# Patient Record
Sex: FEMALE | Race: WHITE | Employment: OTHER | URBAN - METROPOLITAN AREA
[De-identification: names, ages, dates, MRNs, and addresses within clinical notes are randomized per-mention and may not be internally consistent; named-entity substitution may affect disease eponyms.]

---

## 2017-01-05 ENCOUNTER — TELEPHONE (OUTPATIENT)
Dept: FAMILY MEDICINE CLINIC | Age: 57
End: 2017-01-05

## 2017-01-05 RX ORDER — ALPRAZOLAM 0.25 MG/1
0.25 TABLET ORAL 2 TIMES DAILY PRN
Qty: 40 TABLET | Refills: 0 | Status: SHIPPED | OUTPATIENT
Start: 2017-01-05 | End: 2017-02-01 | Stop reason: ALTCHOICE

## 2017-01-23 ENCOUNTER — OFFICE VISIT (OUTPATIENT)
Dept: FAMILY MEDICINE CLINIC | Age: 57
End: 2017-01-23

## 2017-01-23 VITALS
WEIGHT: 159.4 LBS | HEART RATE: 72 BPM | BODY MASS INDEX: 25.02 KG/M2 | DIASTOLIC BLOOD PRESSURE: 84 MMHG | SYSTOLIC BLOOD PRESSURE: 138 MMHG | HEIGHT: 67 IN | TEMPERATURE: 98.2 F

## 2017-01-23 DIAGNOSIS — E11.9 TYPE 2 DIABETES MELLITUS WITHOUT COMPLICATION, WITHOUT LONG-TERM CURRENT USE OF INSULIN (HCC): Primary | ICD-10-CM

## 2017-01-23 DIAGNOSIS — E11.9 TYPE 2 DIABETES MELLITUS WITHOUT COMPLICATION, WITHOUT LONG-TERM CURRENT USE OF INSULIN (HCC): ICD-10-CM

## 2017-01-23 DIAGNOSIS — R01.1 HEART MURMUR: ICD-10-CM

## 2017-01-23 LAB
A/G RATIO: 1.7 (ref 1.1–2.2)
ALBUMIN SERPL-MCNC: 4.3 G/DL (ref 3.4–5)
ALP BLD-CCNC: 163 U/L (ref 40–129)
ALT SERPL-CCNC: 76 U/L (ref 10–40)
ANION GAP SERPL CALCULATED.3IONS-SCNC: 15 MMOL/L (ref 3–16)
AST SERPL-CCNC: 43 U/L (ref 15–37)
BILIRUB SERPL-MCNC: 0.4 MG/DL (ref 0–1)
BUN BLDV-MCNC: 11 MG/DL (ref 7–20)
CALCIUM SERPL-MCNC: 9.4 MG/DL (ref 8.3–10.6)
CHLORIDE BLD-SCNC: 101 MMOL/L (ref 99–110)
CO2: 27 MMOL/L (ref 21–32)
CREAT SERPL-MCNC: 0.7 MG/DL (ref 0.6–1.1)
GFR AFRICAN AMERICAN: >60
GFR NON-AFRICAN AMERICAN: >60
GLOBULIN: 2.6 G/DL
GLUCOSE BLD-MCNC: 95 MG/DL (ref 70–99)
POTASSIUM SERPL-SCNC: 4.3 MMOL/L (ref 3.5–5.1)
SODIUM BLD-SCNC: 143 MMOL/L (ref 136–145)
TOTAL PROTEIN: 6.9 G/DL (ref 6.4–8.2)

## 2017-01-23 PROCEDURE — 99213 OFFICE O/P EST LOW 20 MIN: CPT | Performed by: FAMILY MEDICINE

## 2017-01-23 RX ORDER — HYDROCODONE BITARTRATE AND ACETAMINOPHEN 5; 325 MG/1; MG/1
TABLET ORAL
COMMUNITY
Start: 2017-01-09 | End: 2017-02-07 | Stop reason: ALTCHOICE

## 2017-01-23 ASSESSMENT — ENCOUNTER SYMPTOMS
SHORTNESS OF BREATH: 0
SORE THROAT: 0
COUGH: 0
VOICE CHANGE: 0

## 2017-01-24 DIAGNOSIS — K76.0 FATTY LIVER: Primary | ICD-10-CM

## 2017-01-24 LAB
ESTIMATED AVERAGE GLUCOSE: 122.6 MG/DL
HBA1C MFR BLD: 5.9 %

## 2017-01-27 ENCOUNTER — HOSPITAL ENCOUNTER (OUTPATIENT)
Dept: NON INVASIVE DIAGNOSTICS | Age: 57
Discharge: HOME | End: 2017-01-27
Attending: FAMILY MEDICINE | Admitting: FAMILY MEDICINE

## 2017-01-27 DIAGNOSIS — R01.1 CARDIAC MURMUR: ICD-10-CM

## 2017-01-27 LAB
LV EF: 40 %
LVEF MODALITY: NORMAL

## 2017-01-30 ENCOUNTER — TELEPHONE (OUTPATIENT)
Dept: FAMILY MEDICINE CLINIC | Age: 57
End: 2017-01-30

## 2017-02-01 ENCOUNTER — OFFICE VISIT (OUTPATIENT)
Dept: CARDIOLOGY CLINIC | Age: 57
End: 2017-02-01

## 2017-02-01 ENCOUNTER — CARE COORDINATION (OUTPATIENT)
Dept: FAMILY MEDICINE CLINIC | Age: 57
End: 2017-02-01

## 2017-02-01 VITALS
SYSTOLIC BLOOD PRESSURE: 142 MMHG | HEIGHT: 67 IN | HEART RATE: 90 BPM | WEIGHT: 160.8 LBS | OXYGEN SATURATION: 97 % | BODY MASS INDEX: 25.24 KG/M2 | DIASTOLIC BLOOD PRESSURE: 90 MMHG

## 2017-02-01 DIAGNOSIS — R00.2 PALPITATION: ICD-10-CM

## 2017-02-01 DIAGNOSIS — F17.210 CIGARETTE NICOTINE DEPENDENCE WITHOUT COMPLICATION: ICD-10-CM

## 2017-02-01 DIAGNOSIS — I42.9 CARDIOMYOPATHY (HCC): Primary | ICD-10-CM

## 2017-02-01 DIAGNOSIS — I10 ESSENTIAL HYPERTENSION: ICD-10-CM

## 2017-02-01 DIAGNOSIS — I35.1 NONRHEUMATIC AORTIC VALVE INSUFFICIENCY: ICD-10-CM

## 2017-02-01 DIAGNOSIS — E78.2 MIXED HYPERLIPIDEMIA: ICD-10-CM

## 2017-02-01 PROCEDURE — 99214 OFFICE O/P EST MOD 30 MIN: CPT | Performed by: INTERNAL MEDICINE

## 2017-02-01 RX ORDER — LOSARTAN POTASSIUM 25 MG/1
25 TABLET ORAL DAILY
Qty: 30 TABLET | Refills: 3 | Status: SHIPPED | OUTPATIENT
Start: 2017-02-01 | End: 2017-02-06 | Stop reason: SDUPTHER

## 2017-02-01 RX ORDER — SULINDAC 200 MG/1
200 TABLET ORAL 2 TIMES DAILY
COMMUNITY
End: 2017-04-03 | Stop reason: SDUPTHER

## 2017-02-06 RX ORDER — LOSARTAN POTASSIUM 50 MG/1
50 TABLET ORAL DAILY
Qty: 30 TABLET | Refills: 6 | Status: SHIPPED | OUTPATIENT
Start: 2017-02-06 | End: 2017-02-23 | Stop reason: SDUPTHER

## 2017-02-14 DIAGNOSIS — I42.9 CARDIOMYOPATHY (HCC): ICD-10-CM

## 2017-02-14 LAB
ANION GAP SERPL CALCULATED.3IONS-SCNC: 15 MMOL/L (ref 3–16)
BUN BLDV-MCNC: 14 MG/DL (ref 7–20)
CALCIUM SERPL-MCNC: 9.3 MG/DL (ref 8.3–10.6)
CHLORIDE BLD-SCNC: 103 MMOL/L (ref 99–110)
CO2: 25 MMOL/L (ref 21–32)
CREAT SERPL-MCNC: 0.8 MG/DL (ref 0.6–1.1)
GFR AFRICAN AMERICAN: >60
GFR NON-AFRICAN AMERICAN: >60
GLUCOSE BLD-MCNC: 117 MG/DL (ref 70–99)
POTASSIUM SERPL-SCNC: 4.7 MMOL/L (ref 3.5–5.1)
PRO-BNP: 81 PG/ML (ref 0–124)
SODIUM BLD-SCNC: 143 MMOL/L (ref 136–145)

## 2017-02-21 RX ORDER — METFORMIN HYDROCHLORIDE 500 MG/1
TABLET, EXTENDED RELEASE ORAL
Qty: 180 TABLET | Refills: 1 | Status: SHIPPED | OUTPATIENT
Start: 2017-02-21 | End: 2017-06-08 | Stop reason: SDUPTHER

## 2017-02-21 RX ORDER — MONTELUKAST SODIUM 10 MG/1
TABLET ORAL
Qty: 90 TABLET | Refills: 1 | Status: SHIPPED | OUTPATIENT
Start: 2017-02-21 | End: 2017-06-30 | Stop reason: SDUPTHER

## 2017-02-22 ENCOUNTER — TELEPHONE (OUTPATIENT)
Dept: CASE MANAGEMENT | Age: 57
End: 2017-02-22

## 2017-02-23 RX ORDER — LOSARTAN POTASSIUM 50 MG/1
50 TABLET ORAL DAILY
Qty: 90 TABLET | Refills: 3 | Status: SHIPPED | OUTPATIENT
Start: 2017-02-23 | End: 2017-10-23 | Stop reason: SDUPTHER

## 2017-03-15 DIAGNOSIS — K76.0 FATTY LIVER: ICD-10-CM

## 2017-03-15 LAB
ALBUMIN SERPL-MCNC: 4.4 G/DL (ref 3.4–5)
ALP BLD-CCNC: 150 U/L (ref 40–129)
ALT SERPL-CCNC: 50 U/L (ref 10–40)
AST SERPL-CCNC: 37 U/L (ref 15–37)
BILIRUB SERPL-MCNC: 0.3 MG/DL (ref 0–1)
BILIRUBIN DIRECT: <0.2 MG/DL (ref 0–0.3)
BILIRUBIN, INDIRECT: ABNORMAL MG/DL (ref 0–1)
TOTAL PROTEIN: 7.1 G/DL (ref 6.4–8.2)

## 2017-03-22 ENCOUNTER — CARE COORDINATION (OUTPATIENT)
Dept: FAMILY MEDICINE CLINIC | Age: 57
End: 2017-03-22

## 2017-04-03 RX ORDER — VENLAFAXINE HYDROCHLORIDE 150 MG/1
CAPSULE, EXTENDED RELEASE ORAL
Qty: 90 CAPSULE | Refills: 1 | Status: SHIPPED | OUTPATIENT
Start: 2017-04-03 | End: 2017-07-08 | Stop reason: SDUPTHER

## 2017-04-03 RX ORDER — SULINDAC 200 MG/1
TABLET ORAL
Qty: 180 TABLET | Refills: 1 | Status: SHIPPED | OUTPATIENT
Start: 2017-04-03 | End: 2017-07-08 | Stop reason: SDUPTHER

## 2017-04-03 RX ORDER — PRAVASTATIN SODIUM 40 MG
TABLET ORAL
Qty: 90 TABLET | Refills: 1 | Status: SHIPPED | OUTPATIENT
Start: 2017-04-03 | End: 2017-07-08 | Stop reason: SDUPTHER

## 2017-04-17 ENCOUNTER — OFFICE VISIT (OUTPATIENT)
Dept: FAMILY MEDICINE CLINIC | Age: 57
End: 2017-04-17

## 2017-04-17 VITALS
BODY MASS INDEX: 26.09 KG/M2 | DIASTOLIC BLOOD PRESSURE: 86 MMHG | HEIGHT: 67 IN | SYSTOLIC BLOOD PRESSURE: 138 MMHG | WEIGHT: 166.2 LBS | TEMPERATURE: 97.8 F

## 2017-04-17 DIAGNOSIS — R31.9 HEMATURIA: ICD-10-CM

## 2017-04-17 DIAGNOSIS — R31.9 HEMATURIA: Primary | ICD-10-CM

## 2017-04-17 LAB
BILIRUBIN URINE: NEGATIVE
BLOOD, URINE: NEGATIVE
CLARITY: ABNORMAL
COLOR: YELLOW
EPITHELIAL CELLS, UA: 3 /HPF (ref 0–5)
GLUCOSE URINE: NEGATIVE MG/DL
HYALINE CASTS: 4 /LPF (ref 0–8)
KETONES, URINE: ABNORMAL MG/DL
LEUKOCYTE ESTERASE, URINE: ABNORMAL
MICROSCOPIC EXAMINATION: YES
NITRITE, URINE: NEGATIVE
PH UA: 6.5
PROTEIN UA: NEGATIVE MG/DL
RBC UA: 8 /HPF (ref 0–4)
SPECIFIC GRAVITY UA: 1.02
UROBILINOGEN, URINE: 1 E.U./DL
WBC UA: 5 /HPF (ref 0–5)

## 2017-04-17 PROCEDURE — 99213 OFFICE O/P EST LOW 20 MIN: CPT | Performed by: FAMILY MEDICINE

## 2017-04-17 RX ORDER — ALPRAZOLAM 0.25 MG/1
0.25 TABLET ORAL 2 TIMES DAILY PRN
Qty: 30 TABLET | Refills: 0 | Status: SHIPPED | OUTPATIENT
Start: 2017-04-17 | End: 2017-07-13 | Stop reason: ALTCHOICE

## 2017-04-19 LAB — URINE CULTURE, ROUTINE: NORMAL

## 2017-05-05 ENCOUNTER — HOSPITAL ENCOUNTER (OUTPATIENT)
Dept: NON INVASIVE DIAGNOSTICS | Age: 57
Discharge: HOME | End: 2017-05-05
Attending: PHYSICIAN ASSISTANT | Admitting: PHYSICIAN ASSISTANT

## 2017-05-05 LAB
ANION GAP SERPL CALCULATED.3IONS-SCNC: 13 MMOL/L (ref 3–16)
BUN BLDV-MCNC: 9 MG/DL (ref 7–20)
CALCIUM SERPL-MCNC: 8.9 MG/DL (ref 8.3–10.6)
CHLORIDE BLD-SCNC: 102 MMOL/L (ref 99–110)
CO2: 24 MMOL/L (ref 21–32)
CREAT SERPL-MCNC: 0.6 MG/DL (ref 0.6–1.1)
GFR AFRICAN AMERICAN: >60
GFR NON-AFRICAN AMERICAN: >60
GLUCOSE BLD-MCNC: 118 MG/DL (ref 70–99)
POTASSIUM SERPL-SCNC: 4.6 MMOL/L (ref 3.5–5.1)
SODIUM BLD-SCNC: 139 MMOL/L (ref 136–145)

## 2017-06-08 ENCOUNTER — OFFICE VISIT (OUTPATIENT)
Dept: FAMILY MEDICINE CLINIC | Age: 57
End: 2017-06-08

## 2017-06-08 VITALS
DIASTOLIC BLOOD PRESSURE: 78 MMHG | TEMPERATURE: 98.1 F | HEIGHT: 67 IN | SYSTOLIC BLOOD PRESSURE: 132 MMHG | WEIGHT: 167.2 LBS | BODY MASS INDEX: 26.24 KG/M2 | HEART RATE: 80 BPM

## 2017-06-08 DIAGNOSIS — E11.9 TYPE 2 DIABETES MELLITUS WITHOUT COMPLICATION, WITHOUT LONG-TERM CURRENT USE OF INSULIN (HCC): Primary | ICD-10-CM

## 2017-06-08 DIAGNOSIS — E11.9 TYPE 2 DIABETES MELLITUS WITHOUT COMPLICATION, WITHOUT LONG-TERM CURRENT USE OF INSULIN (HCC): ICD-10-CM

## 2017-06-08 LAB
A/G RATIO: 1.6 (ref 1.1–2.2)
ALBUMIN SERPL-MCNC: 4.7 G/DL (ref 3.4–5)
ALP BLD-CCNC: 141 U/L (ref 40–129)
ALT SERPL-CCNC: 41 U/L (ref 10–40)
ANION GAP SERPL CALCULATED.3IONS-SCNC: 19 MMOL/L (ref 3–16)
AST SERPL-CCNC: 26 U/L (ref 15–37)
BILIRUB SERPL-MCNC: 0.3 MG/DL (ref 0–1)
BILIRUBIN URINE: NEGATIVE
BLOOD, URINE: NEGATIVE
BUN BLDV-MCNC: 16 MG/DL (ref 7–20)
CALCIUM SERPL-MCNC: 9.7 MG/DL (ref 8.3–10.6)
CHLORIDE BLD-SCNC: 101 MMOL/L (ref 99–110)
CHOLESTEROL, TOTAL: 200 MG/DL (ref 0–199)
CLARITY: ABNORMAL
CO2: 24 MMOL/L (ref 21–32)
COLOR: ABNORMAL
CREAT SERPL-MCNC: 0.7 MG/DL (ref 0.6–1.1)
EPITHELIAL CELLS, UA: 8 /HPF (ref 0–5)
GFR AFRICAN AMERICAN: >60
GFR NON-AFRICAN AMERICAN: >60
GLOBULIN: 2.9 G/DL
GLUCOSE BLD-MCNC: 96 MG/DL (ref 70–99)
GLUCOSE URINE: NEGATIVE MG/DL
HDLC SERPL-MCNC: 81 MG/DL (ref 40–60)
HYALINE CASTS: 8 /LPF (ref 0–8)
KETONES, URINE: NEGATIVE MG/DL
LDL CHOLESTEROL CALCULATED: 90 MG/DL
LEUKOCYTE ESTERASE, URINE: NEGATIVE
MICROSCOPIC EXAMINATION: YES
NITRITE, URINE: NEGATIVE
PH UA: 6
POTASSIUM SERPL-SCNC: 4.8 MMOL/L (ref 3.5–5.1)
PROTEIN UA: 30 MG/DL
RBC UA: 3 /HPF (ref 0–4)
SODIUM BLD-SCNC: 144 MMOL/L (ref 136–145)
SPECIFIC GRAVITY UA: >1.03
TOTAL PROTEIN: 7.6 G/DL (ref 6.4–8.2)
TRIGL SERPL-MCNC: 147 MG/DL (ref 0–150)
UROBILINOGEN, URINE: 0.2 E.U./DL
VLDLC SERPL CALC-MCNC: 29 MG/DL
WBC UA: 9 /HPF (ref 0–5)

## 2017-06-08 PROCEDURE — 99213 OFFICE O/P EST LOW 20 MIN: CPT | Performed by: FAMILY MEDICINE

## 2017-06-08 PROCEDURE — G8510 SCR DEP NEG, NO PLAN REQD: HCPCS | Performed by: FAMILY MEDICINE

## 2017-06-08 RX ORDER — LAMOTRIGINE 100 MG/1
250 TABLET ORAL DAILY
COMMUNITY
Start: 2017-05-17 | End: 2017-08-30 | Stop reason: DRUGHIGH

## 2017-06-08 RX ORDER — METFORMIN HYDROCHLORIDE 500 MG/1
TABLET, EXTENDED RELEASE ORAL
Qty: 180 TABLET | Refills: 1
Start: 2017-06-08 | End: 2017-06-30 | Stop reason: SDUPTHER

## 2017-06-08 RX ORDER — HYDROCODONE BITARTRATE AND ACETAMINOPHEN 5; 325 MG/1; MG/1
TABLET ORAL
COMMUNITY
Start: 2017-06-07 | End: 2017-10-17 | Stop reason: ALTCHOICE

## 2017-06-08 RX ORDER — CYCLOBENZAPRINE HCL 10 MG
10 TABLET ORAL 3 TIMES DAILY
COMMUNITY
Start: 2017-06-07 | End: 2017-10-17 | Stop reason: ALTCHOICE

## 2017-06-08 ASSESSMENT — ENCOUNTER SYMPTOMS: SHORTNESS OF BREATH: 0

## 2017-06-08 ASSESSMENT — PATIENT HEALTH QUESTIONNAIRE - PHQ9
2. FEELING DOWN, DEPRESSED OR HOPELESS: 0
SUM OF ALL RESPONSES TO PHQ9 QUESTIONS 1 & 2: 0
1. LITTLE INTEREST OR PLEASURE IN DOING THINGS: 0
SUM OF ALL RESPONSES TO PHQ QUESTIONS 1-9: 0

## 2017-06-09 LAB
ESTIMATED AVERAGE GLUCOSE: 151.3 MG/DL
FOLATE: >20 NG/ML (ref 4.78–24.2)
HBA1C MFR BLD: 6.9 %
VITAMIN B-12: 503 PG/ML (ref 211–911)

## 2017-06-30 RX ORDER — MONTELUKAST SODIUM 10 MG/1
TABLET ORAL
Qty: 90 TABLET | Refills: 1 | Status: SHIPPED | OUTPATIENT
Start: 2017-06-30 | End: 2017-10-23 | Stop reason: SDUPTHER

## 2017-06-30 RX ORDER — METFORMIN HYDROCHLORIDE 500 MG/1
TABLET, EXTENDED RELEASE ORAL
Qty: 180 TABLET | Refills: 1 | Status: SHIPPED | OUTPATIENT
Start: 2017-06-30 | End: 2017-09-27 | Stop reason: SDUPTHER

## 2017-07-01 ENCOUNTER — HOSPITAL ENCOUNTER (OUTPATIENT)
Dept: MAMMOGRAPHY | Age: 57
Discharge: HOME | End: 2017-07-01
Attending: FAMILY MEDICINE | Admitting: FAMILY MEDICINE

## 2017-07-01 DIAGNOSIS — Z12.31 ENCOUNTER FOR SCREENING MAMMOGRAM FOR BREAST CANCER: ICD-10-CM

## 2017-07-10 RX ORDER — PRAVASTATIN SODIUM 40 MG
TABLET ORAL
Qty: 90 TABLET | Refills: 1 | Status: SHIPPED | OUTPATIENT
Start: 2017-07-10 | End: 2018-01-17 | Stop reason: SDUPTHER

## 2017-07-10 RX ORDER — VENLAFAXINE HYDROCHLORIDE 150 MG/1
CAPSULE, EXTENDED RELEASE ORAL
Qty: 90 CAPSULE | Refills: 1 | Status: SHIPPED | OUTPATIENT
Start: 2017-07-10 | End: 2017-12-26 | Stop reason: SDUPTHER

## 2017-07-10 RX ORDER — SULINDAC 200 MG/1
TABLET ORAL
Qty: 180 TABLET | Refills: 1 | Status: SHIPPED | OUTPATIENT
Start: 2017-07-10 | End: 2018-01-17 | Stop reason: SDUPTHER

## 2017-07-13 ENCOUNTER — TELEPHONE (OUTPATIENT)
Dept: CARDIOLOGY CLINIC | Age: 57
End: 2017-07-13

## 2017-07-13 ENCOUNTER — OFFICE VISIT (OUTPATIENT)
Dept: FAMILY MEDICINE CLINIC | Age: 57
End: 2017-07-13

## 2017-07-13 VITALS
SYSTOLIC BLOOD PRESSURE: 128 MMHG | BODY MASS INDEX: 26.15 KG/M2 | TEMPERATURE: 98.2 F | HEART RATE: 88 BPM | WEIGHT: 166.6 LBS | DIASTOLIC BLOOD PRESSURE: 80 MMHG | HEIGHT: 67 IN

## 2017-07-13 DIAGNOSIS — G47.33 OBSTRUCTIVE SLEEP APNEA SYNDROME: ICD-10-CM

## 2017-07-13 DIAGNOSIS — M54.42 CHRONIC LEFT-SIDED LOW BACK PAIN WITH LEFT-SIDED SCIATICA: ICD-10-CM

## 2017-07-13 DIAGNOSIS — G89.29 CHRONIC LEFT-SIDED LOW BACK PAIN WITH LEFT-SIDED SCIATICA: ICD-10-CM

## 2017-07-13 DIAGNOSIS — E11.9 TYPE 2 DIABETES MELLITUS WITHOUT COMPLICATION, WITHOUT LONG-TERM CURRENT USE OF INSULIN (HCC): ICD-10-CM

## 2017-07-13 DIAGNOSIS — Z01.818 PREOP EXAMINATION: Primary | ICD-10-CM

## 2017-07-13 PROCEDURE — 99214 OFFICE O/P EST MOD 30 MIN: CPT | Performed by: FAMILY MEDICINE

## 2017-07-13 RX ORDER — ALPRAZOLAM 0.25 MG/1
0.25 TABLET ORAL
COMMUNITY
End: 2017-12-28 | Stop reason: ALTCHOICE

## 2017-07-13 ASSESSMENT — ENCOUNTER SYMPTOMS
NAUSEA: 0
CONSTIPATION: 0
ROS SKIN COMMENTS: NO LESIONS
SHORTNESS OF BREATH: 0
CHEST TIGHTNESS: 0
ABDOMINAL PAIN: 0
TROUBLE SWALLOWING: 0
VOMITING: 0
SORE THROAT: 0
EYE PAIN: 0
VOICE CHANGE: 0
DIARRHEA: 0
ABDOMINAL DISTENTION: 0
COUGH: 0
BLOOD IN STOOL: 0

## 2017-08-30 ENCOUNTER — OFFICE VISIT (OUTPATIENT)
Dept: CARDIOLOGY CLINIC | Age: 57
End: 2017-08-30

## 2017-08-30 VITALS
DIASTOLIC BLOOD PRESSURE: 60 MMHG | HEIGHT: 66 IN | SYSTOLIC BLOOD PRESSURE: 128 MMHG | HEART RATE: 102 BPM | OXYGEN SATURATION: 93 % | WEIGHT: 166 LBS | BODY MASS INDEX: 26.68 KG/M2

## 2017-08-30 DIAGNOSIS — I35.1 NONRHEUMATIC AORTIC VALVE INSUFFICIENCY: ICD-10-CM

## 2017-08-30 DIAGNOSIS — F17.210 CIGARETTE NICOTINE DEPENDENCE WITHOUT COMPLICATION: ICD-10-CM

## 2017-08-30 DIAGNOSIS — R00.2 PALPITATIONS: ICD-10-CM

## 2017-08-30 DIAGNOSIS — I10 ESSENTIAL HYPERTENSION: ICD-10-CM

## 2017-08-30 DIAGNOSIS — I42.8 NONISCHEMIC CARDIOMYOPATHY (HCC): Primary | ICD-10-CM

## 2017-08-30 PROCEDURE — 93000 ELECTROCARDIOGRAM COMPLETE: CPT | Performed by: INTERNAL MEDICINE

## 2017-08-30 PROCEDURE — 99214 OFFICE O/P EST MOD 30 MIN: CPT | Performed by: INTERNAL MEDICINE

## 2017-08-30 RX ORDER — LAMOTRIGINE 150 MG/1
300 TABLET ORAL DAILY
COMMUNITY
End: 2017-12-28 | Stop reason: ALTCHOICE

## 2017-08-31 ENCOUNTER — TELEPHONE (OUTPATIENT)
Dept: FAMILY MEDICINE CLINIC | Age: 57
End: 2017-08-31

## 2017-08-31 RX ORDER — CEFUROXIME AXETIL 250 MG/1
250 TABLET ORAL 2 TIMES DAILY
Qty: 20 TABLET | Refills: 0 | Status: SHIPPED | OUTPATIENT
Start: 2017-08-31 | End: 2017-09-10

## 2017-09-19 ENCOUNTER — NURSE ONLY (OUTPATIENT)
Dept: FAMILY MEDICINE CLINIC | Age: 57
End: 2017-09-19

## 2017-09-19 DIAGNOSIS — Z23 NEEDS FLU SHOT: Primary | ICD-10-CM

## 2017-09-19 PROCEDURE — G0008 ADMIN INFLUENZA VIRUS VAC: HCPCS | Performed by: FAMILY MEDICINE

## 2017-09-19 PROCEDURE — 90686 IIV4 VACC NO PRSV 0.5 ML IM: CPT | Performed by: FAMILY MEDICINE

## 2017-09-27 ENCOUNTER — OFFICE VISIT (OUTPATIENT)
Dept: FAMILY MEDICINE CLINIC | Age: 57
End: 2017-09-27

## 2017-09-27 VITALS
BODY MASS INDEX: 26.52 KG/M2 | HEIGHT: 66 IN | WEIGHT: 165 LBS | TEMPERATURE: 98 F | SYSTOLIC BLOOD PRESSURE: 140 MMHG | DIASTOLIC BLOOD PRESSURE: 78 MMHG

## 2017-09-27 DIAGNOSIS — F41.9 ANXIETY: ICD-10-CM

## 2017-09-27 DIAGNOSIS — F41.9 ANXIETY: Primary | ICD-10-CM

## 2017-09-27 DIAGNOSIS — E11.9 TYPE 2 DIABETES MELLITUS WITHOUT COMPLICATION, WITHOUT LONG-TERM CURRENT USE OF INSULIN (HCC): ICD-10-CM

## 2017-09-27 LAB
ANION GAP SERPL CALCULATED.3IONS-SCNC: 23 MMOL/L (ref 3–16)
BUN BLDV-MCNC: 14 MG/DL (ref 7–20)
CALCIUM SERPL-MCNC: 9.5 MG/DL (ref 8.3–10.6)
CHLORIDE BLD-SCNC: 101 MMOL/L (ref 99–110)
CO2: 19 MMOL/L (ref 21–32)
CREAT SERPL-MCNC: 0.6 MG/DL (ref 0.6–1.1)
GFR AFRICAN AMERICAN: >60
GFR NON-AFRICAN AMERICAN: >60
GLUCOSE BLD-MCNC: 164 MG/DL (ref 70–99)
POTASSIUM SERPL-SCNC: 4.3 MMOL/L (ref 3.5–5.1)
SODIUM BLD-SCNC: 143 MMOL/L (ref 136–145)
TSH SERPL DL<=0.05 MIU/L-ACNC: 2.18 UIU/ML (ref 0.27–4.2)

## 2017-09-27 PROCEDURE — 99213 OFFICE O/P EST LOW 20 MIN: CPT | Performed by: FAMILY MEDICINE

## 2017-09-27 RX ORDER — METFORMIN HYDROCHLORIDE 500 MG/1
500 TABLET, EXTENDED RELEASE ORAL
Qty: 180 TABLET | Refills: 1
Start: 2017-09-27 | End: 2018-01-23 | Stop reason: SDUPTHER

## 2017-09-27 RX ORDER — ESCITALOPRAM OXALATE 10 MG/1
10 TABLET ORAL DAILY
COMMUNITY
End: 2017-12-28 | Stop reason: ALTCHOICE

## 2017-09-27 RX ORDER — BUSPIRONE HYDROCHLORIDE 10 MG/1
10 TABLET ORAL 2 TIMES DAILY
Qty: 60 TABLET | Refills: 1 | Status: SHIPPED | OUTPATIENT
Start: 2017-09-27 | End: 2017-12-28 | Stop reason: ALTCHOICE

## 2017-09-28 LAB
ESTIMATED AVERAGE GLUCOSE: 148.5 MG/DL
HBA1C MFR BLD: 6.8 %

## 2017-10-09 ENCOUNTER — OFFICE VISIT (OUTPATIENT)
Dept: FAMILY MEDICINE CLINIC | Age: 57
End: 2017-10-09

## 2017-10-09 VITALS
HEIGHT: 66 IN | BODY MASS INDEX: 26.52 KG/M2 | DIASTOLIC BLOOD PRESSURE: 76 MMHG | WEIGHT: 165 LBS | SYSTOLIC BLOOD PRESSURE: 122 MMHG | TEMPERATURE: 98.9 F

## 2017-10-09 DIAGNOSIS — K14.0 GLOSSITIS: Primary | ICD-10-CM

## 2017-10-09 PROCEDURE — 99213 OFFICE O/P EST LOW 20 MIN: CPT | Performed by: FAMILY MEDICINE

## 2017-10-09 RX ORDER — CLOTRIMAZOLE 10 MG/1
10 LOZENGE ORAL; TOPICAL
Qty: 50 TABLET | Refills: 0 | Status: SHIPPED | OUTPATIENT
Start: 2017-10-09 | End: 2017-10-19

## 2017-10-09 NOTE — PROGRESS NOTES
Subjective:      Patient ID: Jhonathan Aquino is a 60 y.o. female. Patient presents with: Thrush: white spots on tongue    She has irritated tongue  No swallowing pb  Slight irritated throat  No fever  No congestion     height is 5' 6\" (1.676 m) and weight is 165 lb (74.8 kg). Her oral temperature is 98.9 °F (37.2 °C). Her blood pressure is 122/76. Review of Systems    Objective:   Physical Exam   Constitutional: She appears well-developed and well-nourished. No distress. HENT:   Head: Normocephalic and atraumatic. Mouth/Throat: Uvula is midline, oropharynx is clear and moist and mucous membranes are normal. No oral lesions. Slight white coating to the tongue no discrete patches or lesions noted  No inflammation    Lymphadenopathy:        Head (right side): No submental and no submandibular adenopathy present. Head (left side): No submental and no submandibular adenopathy present. She has no cervical adenopathy. Skin: She is not diaphoretic. Assessment:      1.  Glossitis             Plan:      Do use the medication  Call if any problem        Orders Placed This Encounter   Medications    clotrimazole (MYCELEX) 10 MG yuliya     Sig: Take 1 tablet by mouth 5 times daily for 10 days     Dispense:  50 tablet     Refill:  0

## 2017-10-17 ENCOUNTER — OFFICE VISIT (OUTPATIENT)
Dept: FAMILY MEDICINE CLINIC | Age: 57
End: 2017-10-17

## 2017-10-17 VITALS
BODY MASS INDEX: 26.61 KG/M2 | TEMPERATURE: 98.6 F | SYSTOLIC BLOOD PRESSURE: 120 MMHG | HEIGHT: 66 IN | DIASTOLIC BLOOD PRESSURE: 70 MMHG | WEIGHT: 165.6 LBS

## 2017-10-17 DIAGNOSIS — K14.6 TONGUE PAIN: ICD-10-CM

## 2017-10-17 PROCEDURE — 99213 OFFICE O/P EST LOW 20 MIN: CPT | Performed by: INTERNAL MEDICINE

## 2017-10-17 ASSESSMENT — ENCOUNTER SYMPTOMS
COUGH: 0
ABDOMINAL PAIN: 0

## 2017-10-23 RX ORDER — LOSARTAN POTASSIUM 50 MG/1
TABLET ORAL
Qty: 90 TABLET | Refills: 3 | Status: SHIPPED | OUTPATIENT
Start: 2017-10-23 | End: 2018-08-27 | Stop reason: SDUPTHER

## 2017-10-23 RX ORDER — MONTELUKAST SODIUM 10 MG/1
TABLET ORAL
Qty: 90 TABLET | Refills: 1 | Status: SHIPPED | OUTPATIENT
Start: 2017-10-23 | End: 2018-08-23 | Stop reason: SDUPTHER

## 2017-11-27 ENCOUNTER — TELEPHONE (OUTPATIENT)
Dept: FAMILY MEDICINE CLINIC | Age: 57
End: 2017-11-27

## 2017-11-27 NOTE — TELEPHONE ENCOUNTER
PROVIDER NAME: Alton Maldonado  PROVIDER ADDRESS:    PROVIDER [facility], ZIP:   PROVIDER PHONE:    PROVIDER FAX:    PROVIDER NPI:    PROVIDER TAX I.D.:      DX CODE:  Back pain    CPT CODE:      NUMBER OF VISITS:      DATE OF SERVICE:      CALLER NAME:  Jaxson Gonzalez

## 2017-11-27 NOTE — TELEPHONE ENCOUNTER
Referral done thru [employer]   Certification Number [id number]  Valid 11- to [id number]  99 visits    Vladimir Craig advised.

## 2017-12-11 DIAGNOSIS — G47.9 SLEEP DISORDER: Primary | ICD-10-CM

## 2017-12-27 RX ORDER — VENLAFAXINE HYDROCHLORIDE 150 MG/1
CAPSULE, EXTENDED RELEASE ORAL
Qty: 90 CAPSULE | Refills: 1 | Status: SHIPPED | OUTPATIENT
Start: 2017-12-27

## 2017-12-28 ENCOUNTER — OFFICE VISIT (OUTPATIENT)
Dept: FAMILY MEDICINE CLINIC | Age: 57
End: 2017-12-28

## 2017-12-28 VITALS
WEIGHT: 166.4 LBS | HEART RATE: 72 BPM | BODY MASS INDEX: 26.74 KG/M2 | TEMPERATURE: 97.6 F | DIASTOLIC BLOOD PRESSURE: 82 MMHG | HEIGHT: 66 IN | SYSTOLIC BLOOD PRESSURE: 126 MMHG

## 2017-12-28 DIAGNOSIS — G47.33 OBSTRUCTIVE SLEEP APNEA SYNDROME: ICD-10-CM

## 2017-12-28 DIAGNOSIS — Z72.0 TOBACCO ABUSE: ICD-10-CM

## 2017-12-28 DIAGNOSIS — Z12.2 ENCOUNTER FOR SCREENING FOR LUNG CANCER: ICD-10-CM

## 2017-12-28 DIAGNOSIS — E78.2 MIXED HYPERLIPIDEMIA: ICD-10-CM

## 2017-12-28 DIAGNOSIS — E11.9 TYPE 2 DIABETES MELLITUS WITHOUT COMPLICATION, WITHOUT LONG-TERM CURRENT USE OF INSULIN (HCC): ICD-10-CM

## 2017-12-28 DIAGNOSIS — E11.9 TYPE 2 DIABETES MELLITUS WITHOUT COMPLICATION, WITHOUT LONG-TERM CURRENT USE OF INSULIN (HCC): Primary | ICD-10-CM

## 2017-12-28 DIAGNOSIS — F17.210 NICOTINE DEPENDENCE, CIGARETTES, UNCOMPLICATED: ICD-10-CM

## 2017-12-28 LAB
A/G RATIO: 1.7 (ref 1.1–2.2)
ALBUMIN SERPL-MCNC: 4.6 G/DL (ref 3.4–5)
ALP BLD-CCNC: 225 U/L (ref 40–129)
ALT SERPL-CCNC: 59 U/L (ref 10–40)
ANION GAP SERPL CALCULATED.3IONS-SCNC: 18 MMOL/L (ref 3–16)
AST SERPL-CCNC: 35 U/L (ref 15–37)
BILIRUB SERPL-MCNC: 0.5 MG/DL (ref 0–1)
BUN BLDV-MCNC: 11 MG/DL (ref 7–20)
CALCIUM SERPL-MCNC: 9.8 MG/DL (ref 8.3–10.6)
CHLORIDE BLD-SCNC: 98 MMOL/L (ref 99–110)
CO2: 22 MMOL/L (ref 21–32)
CREAT SERPL-MCNC: 0.6 MG/DL (ref 0.6–1.1)
CREATININE URINE: 57.1 MG/DL (ref 28–259)
GFR AFRICAN AMERICAN: >60
GFR NON-AFRICAN AMERICAN: >60
GLOBULIN: 2.7 G/DL
GLUCOSE BLD-MCNC: 292 MG/DL (ref 70–99)
MICROALBUMIN UR-MCNC: <1.2 MG/DL
MICROALBUMIN/CREAT UR-RTO: NORMAL MG/G (ref 0–30)
POTASSIUM SERPL-SCNC: 4.4 MMOL/L (ref 3.5–5.1)
SODIUM BLD-SCNC: 138 MMOL/L (ref 136–145)
TOTAL PROTEIN: 7.3 G/DL (ref 6.4–8.2)

## 2017-12-28 PROCEDURE — 3044F HG A1C LEVEL LT 7.0%: CPT | Performed by: FAMILY MEDICINE

## 2017-12-28 PROCEDURE — 99214 OFFICE O/P EST MOD 30 MIN: CPT | Performed by: FAMILY MEDICINE

## 2017-12-28 PROCEDURE — G8484 FLU IMMUNIZE NO ADMIN: HCPCS | Performed by: FAMILY MEDICINE

## 2017-12-28 PROCEDURE — G8417 CALC BMI ABV UP PARAM F/U: HCPCS | Performed by: FAMILY MEDICINE

## 2017-12-28 PROCEDURE — G8427 DOCREV CUR MEDS BY ELIG CLIN: HCPCS | Performed by: FAMILY MEDICINE

## 2017-12-28 PROCEDURE — 4004F PT TOBACCO SCREEN RCVD TLK: CPT | Performed by: FAMILY MEDICINE

## 2017-12-28 PROCEDURE — G0296 VISIT TO DETERM LDCT ELIG: HCPCS | Performed by: FAMILY MEDICINE

## 2017-12-28 PROCEDURE — 3014F SCREEN MAMMO DOC REV: CPT | Performed by: FAMILY MEDICINE

## 2017-12-28 PROCEDURE — 3017F COLORECTAL CA SCREEN DOC REV: CPT | Performed by: FAMILY MEDICINE

## 2017-12-28 RX ORDER — CYCLOBENZAPRINE HCL 10 MG
TABLET ORAL
COMMUNITY
Start: 2017-12-21 | End: 2018-11-27

## 2017-12-28 ASSESSMENT — ENCOUNTER SYMPTOMS
COUGH: 0
SHORTNESS OF BREATH: 0
CHEST TIGHTNESS: 0
DIARRHEA: 0
TROUBLE SWALLOWING: 0
NAUSEA: 0
ABDOMINAL PAIN: 0
SORE THROAT: 0
BLOOD IN STOOL: 0
VOMITING: 0
VOICE CHANGE: 0
ABDOMINAL DISTENTION: 0
CONSTIPATION: 0

## 2017-12-28 NOTE — PROGRESS NOTES
Subjective:      Patient ID: Tommy Gray is a 59 y.o. female. Patient presents with:  Diabetes: 3 mo f/u on dm    She is no longer seeing dr Jaime Schmitt as she was not doing better and on multiple meds    She is now getting xanax from dr Eugenie Aldridge    Her glucose I around 120 and less  She has no foot sx    We talked about getting ct of the chest and she agreed    She has concerns about mother being forgetfull and we will refer to dr Berkley Martin    YOB: 1960    Date of Visit:  12/28/2017     -- Azithromycin -- Itching, Rash, Other (See Comments)                           and Swelling    --  Other reaction(s): Other (See Comments)             FEVER             FEVER   -- Baclofen -- Nausea And Vomiting, Itching and                            Rash   -- Nuts (Peanut Oil) -- Anaphylaxis   -- Peanut-Containing Drug Products -- Anaphylaxis   -- Sulfa Antibiotics -- Anaphylaxis and Rash   -- Adalimumab -- Other (See Comments)    --  \"immune system crashes\"   -- Avelox (Moxifloxacin Hcl In Nacl)    -- Bactrim    -- Infliximab -- Other (See Comments)    --  \"immune system crashes\"   -- Lisinopril -- Other (See Comments)    --  Cough   -- Zithromax (Azithromycin Dihydrate)    -- Adhesive Tape -- Rash    --  PAPER TAPE IS OK             PAPER TAPE IS OK   -- Moxifloxacin -- Rash, Other (See Comments) and                            Swelling    --  Other reaction(s): Other (See Comments)             FEVER             FEVER             Other reaction(s): Fever   -- Sulfamethoxazole-Trimethoprim -- Other (See Comments), Rash and                            Swelling    --  Other reaction(s):  Other (See Comments)             FEVER             FEVER    Current Outpatient Prescriptions:  Cyanocobalamin (VITAMIN B 12 PO), Take by mouth daily, Disp: , Rfl:   cyclobenzaprine (FLEXERIL) 10 MG tablet, , Disp: , Rfl:   ALPRAZolam (XANAX PO), Take by mouth, Disp: , Rfl:   venlafaxine (EFFEXOR XR) 150 MG extended release capsule, TAKE 1 CAPSULE EVERY DAY, Disp: 90 capsule, Rfl: 1  montelukast (SINGULAIR) 10 MG tablet, TAKE 1 TABLET EVERY DAY, Disp: 90 tablet, Rfl: 1  losartan (COZAAR) 50 MG tablet, TAKE 1 TABLET EVERY DAY, Disp: 90 tablet, Rfl: 3  metFORMIN (GLUCOPHAGE-XR) 500 MG extended release tablet, Take 1 tablet by mouth daily (with breakfast), Disp: 180 tablet, Rfl: 1  pravastatin (PRAVACHOL) 40 MG tablet, TAKE 1 TABLET EVERY DAY, Disp: 90 tablet, Rfl: 1  sulindac (CLINORIL) 200 MG tablet, TAKE 1 TABLET TWICE DAILY WITH MEALS, Disp: 180 tablet, Rfl: 1  albuterol (PROVENTIL) (2.5 MG/3ML) 0.083% nebulizer solution, Use one vial every 6 hours in nebulizer as needed, Disp: 120 vial, Rfl: 2  Multiple Vitamins-Minerals (MULTIVITAMIN ADULT PO), Take 1 tablet by mouth, Disp: , Rfl:   albuterol sulfate HFA (PROAIR HFA) 108 (90 BASE) MCG/ACT inhaler, Inhale 2 puffs into the lungs every 6 hours as needed for Wheezing, Disp: 1 Inhaler, Rfl: 1  ACCU-CHEK FASTCLIX LANCETS MISC, TEST ONE TIME DAILY, Disp: 102 each, Rfl: 3   ACCU-CHEK SMARTVIEW strip, TEST ONE TIME DAILY, Disp: 100 strip, Rfl: 3    No current facility-administered medications for this visit.       ---------------------------------                  12/28/17                            1308            ---------------------------------   BP:             126/82            Temp:      97.6 °F (36.4 °C)      TempSrc:         Oral             Weight: 166 lb 6.4 oz (75.5 kg)   Height:     5' 6\" (1.676 m)      ---------------------------------  Body mass index is 26.86 kg/m². Wt Readings from Last 3 Encounters:  12/28/17 : 166 lb 6.4 oz (75.5 kg)  10/17/17 : 165 lb 9.6 oz (75.1 kg)  10/09/17 : 165 lb (74.8 kg)    BP Readings from Last 3 Encounters:  12/28/17 : 126/82  10/17/17 : 120/70  10/09/17 : 122/76            Review of Systems   Constitutional: Negative for appetite change, fever and unexpected weight change.    HENT: Negative for sore throat, trouble clubbing. Psychiatric: She has a normal mood and affect. Her speech is normal.       Assessment:       1. Type 2 diabetes mellitus without complication, without long-term current use of insulin (HCC)  HM DIABETES FOOT EXAM    Microalbumin / Creatinine Urine Ratio    Comprehensive Metabolic Panel    Hemoglobin A1C   2. Mixed hyperlipidemia  Comprehensive Metabolic Panel   3. Obstructive sleep apnea syndrome     4. Tobacco abuse     5. Encounter for screening for lung cancer     6. Nicotine dependence, cigarettes, uncomplicated  OK VISIT TO DISCUSS LUNG CA SCREEN W LDCT    CT Lung Screening       The pacheco is stable  Order ct lung screen  Removed dx that were nonessential or resolved from the list today      Plan:      Dr Berkley Alonso is a doctor through [facility] that may be able to help with you mother  When you see the eye doctor in January do have them send a note to us  See back in 4 months  Do stay with the diet  Continue to stop the tobacco          Low Dose CT (LDCT) Lung Screening criteria met   Age 55-77   Pack year smoking >30   Still smoking or less than 15 year since quit   No sign or symptoms of lung cancer   > 11 months since last LDCT     Risks and benefits of lung cancer screening with LDCT scans discussed:    Significance of positive screen - False-positive LDCT results often occur. 95% of all positive results do not lead to a diagnosis of cancer. Usually further imaging can resolve most false-positive results; however, some patients may require invasive procedures. Over diagnosis risk - 10% to 12% of screen-detected lung cancer cases are over diagnosedthat is, the cancer would not have been detected in the patient's lifetime without the screening.     Need for follow up screens annually to continue lung cancer screening effectiveness     Risks associated with radiation from annual LDCT- Radiation exposure is about the same as for a mammogram, which is about 1/3 of the annual background radiation exposure from everyday life. Starting screening at age 54 is not likely to increase cancer risk from radiation exposure. Patients with comorbidities resulting in life expectancy of < 10 years, or that would preclude treatment of an abnormality identified on CT, should not be screened due to lack of benefit.     To obtain maximal benefit from this screening, smoking cessation and long-term abstinence from smoking is critical

## 2017-12-28 NOTE — PATIENT INSTRUCTIONS
Dr Geri Mcclure is a doctor through [facility] that may be able to help with you mother  When you see the eye doctor in January do have them send a note to us  See back in 4 months  Do stay with the diet  Continue to stop the tobacco        Cigarette Smoking and Its Health Risks   GENERAL INFORMATION:   Smoking and your health: Cigarette smoking is the most preventable cause of illness and death in the [place]. A large number of Americans smoke cigarettes, and each year more than one million children and adults start smoking cigarettes. Many people die every year from illnesses caused by smoking. People who smoke die earlier than those who do not smoke. The risk of disease increases if you smoke a lot, inhale deeply, or have smoked many years. Why are cigarettes bad for you? Cigarettes are filled with poison that goes into the lungs when you inhale. Coughing, dizziness, and burning of the eyes, nose, and throat are early signs that smoking is harming you. Smoking increases your health risks if you have diabetes, high blood pressure, or high blood cholesterol. The long-term problems of smoking cigarettes are the following:   Cancer: Smoking increases your chances of getting cancer. Cigarette smoking may play a role in developing many kinds of cancer. Lung cancer is the most common kind of cancer caused by smoking. A smoker is at greater risk of getting cancer of the lips, mouth, throat, or voice box. Smokers also have a higher risk of getting esophagus, stomach, kidney, pancreas, cervix, bladder, and skin cancer. Heart and blood vessel disease: If you already have heart or blood vessel problems and smoke, you are at even greater risk of having continued or worse health problems. The nicotine in the tobacco causes an increase in your heart rate and blood pressure. The arteries (blood vessels) in your arms and legs tighten and narrow because of the nicotine in cigarette smoke.  Cigarette smoke increases blood clotting, and may damage the lining of your heart's arteries and other blood vessels. Carbon monoxide is a harmful gas that gets into the blood and decreases oxygen going to the heart and the body. Cigarette smoke contains this gas. Hardening of the arteries happens more often in smokers than in nonsmokers. This may make it more likely for you to have a stroke (blood clot in your brain). The more cigarettes you smoke, the greater your risk of a heart attack. Lung disease: The younger you are when you start smoking, the greater your risk of getting lung diseases. Many smokers have a cough which is caused by the chemicals in smoke. These chemicals harm the cilia (tiny hairs) that line the lungs and help remove dirt and waste products. Depending upon how much you smoke, your lungs become gray and \"dirty\" (they look like charcoal). Healthy lungs are pink. Chronic bronchitis is a serious lung infection which is often caused by smoking. Emphysema is a long-term lung disease that may be caused by smoking cigarettes. Cigarette smoking also makes asthma worse. You are at a higher risk of getting colds, pneumonia, and other lung infections if you smoke. Gastrointestinal disease: Cigarette smoking increases the amount of acid that is made by your stomach, and may cause a peptic ulcer. A peptic ulcer is an open sore in the stomach or duodenum (part of the intestine). You may also get gastroesophageal reflux from smoking. This is when you have a backflow of stomach acid into your esophagus (food tube). Other problems: The following are other problems that smoking may cause: Bad breath. Bad smell in your clothes, hair, and skin. Decreased ability to play sports or do physical activities because of breathing problems. Earlier than normal wrinkling of the skin, usually the face. Higher risk of bone fractures, such as hip, wrist, or spine. Higher risk of starting a fire.  This may happen if you fall asleep with a lit cigarette. Men may have problems having an erection. Sleeping problems. Smoking is an expensive (costly) habit. You will save money if you choose to stop smoking. Sore throat. Staining of teeth. Women and smoking: You may have a higher risk of having a heart attack or stroke if you smoke and use birth control pills. This risk is more serious if you are 35 years or older. The risk of losing your unborn baby or having a stillborn baby is higher if you are pregnant and smoke. Babies born to smoking mothers often weigh less, and are at a higher risk of sudden infant death syndrome (SIDS). You may have a harder time getting pregnant if you are a smoker. Women who smoke may have a higher risk of osteoporosis (also known as \"brittle bones\"). Women who smoke also have a higher risk of incontinence, which is when you are unable to control when you urinate. Are there risks with smoking cigars or pipes? The risks are the same for people who smoke cigars or pipes as they are for cigarette smokers. There is a risk of getting cancer of the mouth, lip, larynx (voice box), or esophagus if you smoke a cigar or pipe. What are the risks of using snuff or chewing tobacco (\"smokeless tobacco\")? People who use snuff or chewing tobacco have an increased risk of getting mouth or throat cancer. The risk of heart disease, stroke, blood vessel disease and stomach problems is the same as it is for cigarette smokers. What is \"passive smoking\"? Tobacco smoke is dangerous to others. The effect that smoking has on nonsmokers is called \"passive smoking\". Nonsmokers who breathe tobacco smoke have the same health risks as smokers. Children who are around tobacco smoke may have more colds, ear infections, or other breathing problems. Why should I quit smoking? The benefits from quitting smoking happen right away. Your sense of taste and smell will improve.  Your body, clothes, car, and home will not smell of tobacco smoke. Your chance of getting cancer will be reduced as compared to a person who does not quit. As a former smoker, you will live longer than people who continue to smoke. Women who quit smoking before getting pregnant have a better chance of having a healthy baby. You will decrease the health risks of nonsmokers if you stop smoking. By stopping smoking you will also save money. What is the best way to stop smoking? A large percentage of people have tried to quit smoking at least once. Most people who try to quit smoking go through a series of stages. Following are the stages you may go through to stop smoking: Thinking about quitting. Deciding to quit on a certain day. Quitting smoking. Successfully staying an ex-smoker. You must be strong in order to quit smoking. When you decide to quit, you can get help from your caregiver or others. You will learn that there are many ways to stop smoking. Talk to your caregiver about the best method for you when you are ready to quit smoking. Ask your caregiver for more information about how to stop smoking. Call or write the following for more information about the risks of smoking. Smokefree. gov  Phone: 7-181.305.9487  Web Address: www.smokefree. gov  American Lung Association  [facility]. [address]  Phone: 5-6-640.602.9292  Phone: 3-1-370--389-2079  Web Address: [employer]. [address] [place] [address]  Phone: 7-496.216.5373  Web Address: http://[employer]/. gov   CARE AGREEMENT:   You have the right to help plan your care. To help with this plan, you must learn about your health condition and how it may be treated. You can then discuss treatment options with your caregivers. Work with them to decide what care may be used to treat you. You always have the right to refuse treatment. Copyright © 2009. [employer] All rights reserved.  Information is for End User's use only and may not be sold, redistributed or otherwise used for commercial purposes. The above information is an  only. It is not intended as medical advice for individual conditions or treatments. Talk to your doctor, nurse or pharmacist before following any medical regimen to see if it is safe and effective for you. What is lung cancer screening? Lung cancer screening is a way in which doctors check the lungs for early signs of cancer in people who have no symptoms of lung cancer. A low-dose CT scan uses much less radiation than a normal CT scan and shows a more detailed image of the lungs than a standard X-ray. The goal of lung cancer screening is to find cancer early, before it has a chance to grow, spread, or cause problems. One large study found that smokers who were screened with low-dose CT scans were less likely to die of lung cancer than those who were screened with standard X-ray. Below is a summary of the things you need to know regarding screening for lung cancer with low-dose computed tomography (LDCT). This is a screening program that involves routine annual screening with LDCT studies of the lung. The LDCTs are done using low-dose radiation that is not thought to increase your cancer risk. If you have other serious medical conditions (other cancers, congestive heart failure) that limit your life expectancy to less than 10 years, you should not undergo lung cancer screening with LDCT. The chance is 20%-60% that the LDCT result will show abnormalities. This would require additional testing which could include repeat imaging or even invasive procedures. Most (about 95%) of \"abnormal\" LDCT results are false in the sense that no lung cancer is ultimately found. Additionally, some (about 10%) of the cancers found would not affect your life expectancy, even if undetected and untreated.   If you are still smoking, the single most important thing that you can do to reduce your risk of dying of lung cancer is to quit. For this screening to be covered by Medicare and most other insurers, strict criteria must be met. If you do not meet these criteria, but still wish to undergo LDCT testing, you will be required to sign a waiver indicating your willingness to pay for the scan.

## 2017-12-29 ENCOUNTER — TELEPHONE (OUTPATIENT)
Dept: FAMILY MEDICINE CLINIC | Age: 57
End: 2017-12-29

## 2017-12-29 DIAGNOSIS — R74.8 LIVER ENZYME ELEVATION: Primary | ICD-10-CM

## 2017-12-29 LAB
ESTIMATED AVERAGE GLUCOSE: 208.7 MG/DL
HBA1C MFR BLD: 8.9 %

## 2017-12-29 NOTE — TELEPHONE ENCOUNTER
Ankit Pennington advised and verbalized understanding. Central Scheduling [phone] was given to patient. Please call Ankit Pennington - she has questions regarding her medications and her test results.

## 2017-12-29 NOTE — TELEPHONE ENCOUNTER
It is likely from her rheumatoid  I need for her to follow up with the rheumatologist for this  I also want to repeat her liver ultrasound  I know she has had liver w/u and bx but I need to recheck that as well

## 2017-12-29 NOTE — TELEPHONE ENCOUNTER
Pt is wanting to know why her Alkaline Phosphatase is so high on her test results?     Pl advise  [phone]

## 2018-01-09 ENCOUNTER — TELEPHONE (OUTPATIENT)
Dept: FAMILY MEDICINE CLINIC | Age: 58
End: 2018-01-09

## 2018-01-09 NOTE — TELEPHONE ENCOUNTER
Jacquelyn states she was told to increase her Metformin to take 2 a day. She states her sugars are staying above 200 so she increased to 3 pills a day. Her sugars are still staying above 200. What should she do?

## 2018-01-10 ENCOUNTER — HOSPITAL ENCOUNTER (OUTPATIENT)
Dept: CT IMAGING | Age: 58
Discharge: HOME | End: 2018-01-10
Admitting: FAMILY MEDICINE

## 2018-01-10 DIAGNOSIS — F17.210 NICOTINE DEPENDENCE, CIGARETTES, UNCOMPLICATED: ICD-10-CM

## 2018-01-10 DIAGNOSIS — F17.210 CIGARETTE NICOTINE DEPENDENCE, UNCOMPLICATED: ICD-10-CM

## 2018-01-10 DIAGNOSIS — R74.8 LIVER ENZYME ELEVATION: ICD-10-CM

## 2018-01-23 ENCOUNTER — OFFICE VISIT (OUTPATIENT)
Dept: FAMILY MEDICINE CLINIC | Age: 58
End: 2018-01-23

## 2018-01-23 VITALS
TEMPERATURE: 97.7 F | WEIGHT: 165 LBS | HEART RATE: 72 BPM | DIASTOLIC BLOOD PRESSURE: 82 MMHG | SYSTOLIC BLOOD PRESSURE: 130 MMHG | HEIGHT: 66 IN | BODY MASS INDEX: 26.52 KG/M2

## 2018-01-23 DIAGNOSIS — E11.9 TYPE 2 DIABETES MELLITUS WITHOUT COMPLICATION, WITHOUT LONG-TERM CURRENT USE OF INSULIN (HCC): Primary | ICD-10-CM

## 2018-01-23 DIAGNOSIS — R74.8 ELEVATED ALKALINE PHOSPHATASE LEVEL: ICD-10-CM

## 2018-01-23 DIAGNOSIS — E11.9 TYPE 2 DIABETES MELLITUS WITHOUT COMPLICATION, WITHOUT LONG-TERM CURRENT USE OF INSULIN (HCC): ICD-10-CM

## 2018-01-23 LAB
ALBUMIN SERPL-MCNC: 4.6 G/DL (ref 3.4–5)
ALP BLD-CCNC: 167 U/L (ref 40–129)
ALT SERPL-CCNC: 48 U/L (ref 10–40)
AST SERPL-CCNC: 35 U/L (ref 15–37)
BILIRUB SERPL-MCNC: 0.3 MG/DL (ref 0–1)
BILIRUBIN DIRECT: <0.2 MG/DL (ref 0–0.3)
BILIRUBIN, INDIRECT: ABNORMAL MG/DL (ref 0–1)
GLUCOSE BLD-MCNC: 137 MG/DL (ref 70–99)
TOTAL PROTEIN: 7.3 G/DL (ref 6.4–8.2)

## 2018-01-23 PROCEDURE — G8417 CALC BMI ABV UP PARAM F/U: HCPCS | Performed by: FAMILY MEDICINE

## 2018-01-23 PROCEDURE — 99213 OFFICE O/P EST LOW 20 MIN: CPT | Performed by: FAMILY MEDICINE

## 2018-01-23 PROCEDURE — G8484 FLU IMMUNIZE NO ADMIN: HCPCS | Performed by: FAMILY MEDICINE

## 2018-01-23 PROCEDURE — 3017F COLORECTAL CA SCREEN DOC REV: CPT | Performed by: FAMILY MEDICINE

## 2018-01-23 PROCEDURE — 3046F HEMOGLOBIN A1C LEVEL >9.0%: CPT | Performed by: FAMILY MEDICINE

## 2018-01-23 PROCEDURE — G8427 DOCREV CUR MEDS BY ELIG CLIN: HCPCS | Performed by: FAMILY MEDICINE

## 2018-01-23 PROCEDURE — 4004F PT TOBACCO SCREEN RCVD TLK: CPT | Performed by: FAMILY MEDICINE

## 2018-01-23 PROCEDURE — 3014F SCREEN MAMMO DOC REV: CPT | Performed by: FAMILY MEDICINE

## 2018-01-23 RX ORDER — METFORMIN HYDROCHLORIDE 1000 MG/1
1000 TABLET, FILM COATED, EXTENDED RELEASE ORAL 2 TIMES DAILY WITH MEALS
Qty: 180 TABLET | Refills: 1 | Status: SHIPPED | OUTPATIENT
Start: 2018-01-23 | End: 2018-01-31 | Stop reason: CLARIF

## 2018-01-23 NOTE — PROGRESS NOTES
and well-nourished. No distress. Cardiovascular:   Pulses:       Dorsalis pedis pulses are 2+ on the right side, and 2+ on the left side. Posterior tibial pulses are 2+ on the right side, and 2+ on the left side. No edema   Pulmonary/Chest: Effort normal and breath sounds normal. No accessory muscle usage. No respiratory distress. She has no decreased breath sounds. She has no wheezes. She has no rhonchi. She has no rales. Abdominal: Soft. Normal appearance and bowel sounds are normal. She exhibits no distension, no abdominal bruit, no pulsatile midline mass and no mass. There is no hepatosplenomegaly. There is no tenderness. There is no rigidity and no guarding. Neurological: She is alert. Skin: Skin is warm, dry and intact. She is not diaphoretic. No cyanosis. No pallor. Nails show no clubbing. Psychiatric: She has a normal mood and affect. Assessment:      1. Type 2 diabetes mellitus without complication, without long-term current use of insulin (HCC)  Hemoglobin A1C    Glucose   2.  Elevated alkaline phosphatase level  HEPATIC FUNCTION PANEL           Plan:      Increase the metformin to 1000 units twice a day  Get the blood today  See in 2 months

## 2018-01-24 LAB
ESTIMATED AVERAGE GLUCOSE: 197.3 MG/DL
HBA1C MFR BLD: 8.5 %

## 2018-01-25 ENCOUNTER — TELEPHONE (OUTPATIENT)
Dept: FAMILY MEDICINE CLINIC | Age: 58
End: 2018-01-25

## 2018-01-25 NOTE — TELEPHONE ENCOUNTER
We discussed and she tells me has had back procedures that could have affected the alk phos  I did suggest she see her rheumatologist again and she wanted to wait and observe  The dm is better

## 2018-01-31 ENCOUNTER — TELEPHONE (OUTPATIENT)
Dept: FAMILY MEDICINE CLINIC | Age: 58
End: 2018-01-31

## 2018-01-31 RX ORDER — METFORMIN HYDROCHLORIDE 500 MG/1
1000 TABLET, EXTENDED RELEASE ORAL 2 TIMES DAILY WITH MEALS
Qty: 360 TABLET | Refills: 1 | Status: SHIPPED | OUTPATIENT
Start: 2018-01-31 | End: 2018-08-23 | Stop reason: SDUPTHER

## 2018-02-13 ENCOUNTER — HOSPITAL ENCOUNTER (OUTPATIENT)
Dept: NON INVASIVE DIAGNOSTICS | Age: 58
Discharge: HOME | End: 2018-02-13
Attending: FAMILY MEDICINE | Admitting: FAMILY MEDICINE

## 2018-02-13 DIAGNOSIS — I35.1 NONRHEUMATIC AORTIC VALVE INSUFFICIENCY: ICD-10-CM

## 2018-02-13 LAB
LEFT VENTRICULAR EJECTION FRACTION HIGH VALUE: 65 %
LEFT VENTRICULAR EJECTION FRACTION MODE: NORMAL
LV EF: 60 %
LV EF: 63 %
LVEF MODALITY: NORMAL

## 2018-02-14 ENCOUNTER — TELEPHONE (OUTPATIENT)
Dept: FAMILY MEDICINE CLINIC | Age: 58
End: 2018-02-14

## 2018-02-14 DIAGNOSIS — F32.A DEPRESSION, UNSPECIFIED DEPRESSION TYPE: Primary | ICD-10-CM

## 2018-02-28 ENCOUNTER — OFFICE VISIT (OUTPATIENT)
Dept: CARDIOLOGY CLINIC | Age: 58
End: 2018-02-28

## 2018-02-28 VITALS
OXYGEN SATURATION: 97 % | HEIGHT: 67 IN | HEART RATE: 93 BPM | DIASTOLIC BLOOD PRESSURE: 80 MMHG | SYSTOLIC BLOOD PRESSURE: 110 MMHG | WEIGHT: 168 LBS | BODY MASS INDEX: 26.37 KG/M2

## 2018-02-28 DIAGNOSIS — I10 ESSENTIAL HYPERTENSION: ICD-10-CM

## 2018-02-28 DIAGNOSIS — R00.2 PALPITATIONS: ICD-10-CM

## 2018-02-28 DIAGNOSIS — E78.2 MIXED HYPERLIPIDEMIA: ICD-10-CM

## 2018-02-28 DIAGNOSIS — I42.8 NONISCHEMIC CARDIOMYOPATHY (HCC): Primary | ICD-10-CM

## 2018-02-28 DIAGNOSIS — R06.09 DOE (DYSPNEA ON EXERTION): ICD-10-CM

## 2018-02-28 DIAGNOSIS — F17.210 CIGARETTE NICOTINE DEPENDENCE WITHOUT COMPLICATION: ICD-10-CM

## 2018-02-28 DIAGNOSIS — I35.1 NONRHEUMATIC AORTIC VALVE INSUFFICIENCY: ICD-10-CM

## 2018-02-28 PROCEDURE — G8427 DOCREV CUR MEDS BY ELIG CLIN: HCPCS | Performed by: INTERNAL MEDICINE

## 2018-02-28 PROCEDURE — 93000 ELECTROCARDIOGRAM COMPLETE: CPT | Performed by: INTERNAL MEDICINE

## 2018-02-28 PROCEDURE — 4004F PT TOBACCO SCREEN RCVD TLK: CPT | Performed by: INTERNAL MEDICINE

## 2018-02-28 PROCEDURE — 99214 OFFICE O/P EST MOD 30 MIN: CPT | Performed by: INTERNAL MEDICINE

## 2018-02-28 PROCEDURE — G8417 CALC BMI ABV UP PARAM F/U: HCPCS | Performed by: INTERNAL MEDICINE

## 2018-02-28 PROCEDURE — G8484 FLU IMMUNIZE NO ADMIN: HCPCS | Performed by: INTERNAL MEDICINE

## 2018-02-28 PROCEDURE — 3014F SCREEN MAMMO DOC REV: CPT | Performed by: INTERNAL MEDICINE

## 2018-02-28 PROCEDURE — 3017F COLORECTAL CA SCREEN DOC REV: CPT | Performed by: INTERNAL MEDICINE

## 2018-02-28 NOTE — PROGRESS NOTES
REMOVAL Bilateral 10/2016    COLONOSCOPY  7.10.09    normal; Dr Danna Klinefelter    cyst on left ovary -removal of left ovary    ECTOPIC PREGNANCY SURGERY  1978    removal of left tube     FOOT SURGERY  9/1998    B/L release of the fascia     HYSTERECTOMY  1986    removal of uterus    SALPINGO-OOPHORECTOMY  4830,4148    left ovary removed following cyst removal, right tube and ovary removed due to adhesion    SINUS SURGERY  1994    polyps in sinuses    SPINE SURGERY  11/2016    lumbar     WRIST SURGERY  3/2003    repair of left wrist torn ligament-tenosynovitis release right      Family History   Problem Relation Age of Onset    Cancer Mother      breast cancer in her 48d   [facility] Diabetes Mother     High Blood Pressure Mother     Stroke Maternal Grandmother     Cancer Paternal Grandfather      bladder cancer    Diabetes Paternal Aunt     Diabetes Paternal Grandmother      Social History   Substance Use Topics    Smoking status: Current Every Day Smoker     Packs/day: 0.75     Years: 30.00     Start date: 3/14/1975    Smokeless tobacco: Never Used      Comment: 1 cig per day, vaporing instead    Alcohol use No     Allergies   Allergen Reactions    Azithromycin Itching, Rash, Other (See Comments) and Swelling     Other reaction(s): Other (See Comments)  FEVER  FEVER    Baclofen Nausea And Vomiting, Itching and Rash    Nuts [Peanut Oil] Anaphylaxis    Peanut-Containing Drug Products Anaphylaxis    Sulfa Antibiotics Anaphylaxis and Rash    Adalimumab Other (See Comments)     \"immune system crashes\"    Avelox [Moxifloxacin Hcl In Nacl]     Bactrim     Infliximab Other (See Comments)     \"immune system crashes\"    Lisinopril Other (See Comments)     Cough    Zithromax [Azithromycin Dihydrate]     Adhesive Tape Rash     PAPER TAPE IS OK  PAPER TAPE IS OK    Moxifloxacin Rash, Other (See Comments) and Swelling     Other reaction(s):  Other (See Comments)  FEVER  FEVER  Other reaction(s): Fever    Sulfamethoxazole-Trimethoprim Other (See Comments), Rash and Swelling     Other reaction(s): Other (See Comments)  FEVER  FEVER     Current Outpatient Prescriptions   Medication Sig Dispense Refill    metFORMIN (GLUCOPHAGE-XR) 500 MG extended release tablet Take 2 tablets by mouth 2 times daily (with meals) 360 tablet 1    glucose blood VI test strips (ACCU-CHEK SMARTVIEW) strip Apply 1 each topically daily 100 strip 3    pravastatin (PRAVACHOL) 40 MG tablet TAKE 1 TABLET EVERY DAY 90 tablet 1    sulindac (CLINORIL) 200 MG tablet TAKE 1 TABLET TWICE DAILY WITH MEALS 180 tablet 1    Cyanocobalamin (VITAMIN B 12 PO) Take by mouth daily      cyclobenzaprine (FLEXERIL) 10 MG tablet       ALPRAZolam (XANAX PO) Take by mouth      venlafaxine (EFFEXOR XR) 150 MG extended release capsule TAKE 1 CAPSULE EVERY DAY 90 capsule 1    montelukast (SINGULAIR) 10 MG tablet TAKE 1 TABLET EVERY DAY 90 tablet 1    losartan (COZAAR) 50 MG tablet TAKE 1 TABLET EVERY DAY 90 tablet 3    albuterol (PROVENTIL) (2.5 MG/3ML) 0.083% nebulizer solution Use one vial every 6 hours in nebulizer as needed 120 vial 2    Multiple Vitamins-Minerals (MULTIVITAMIN ADULT PO) Take 1 tablet by mouth      albuterol sulfate HFA (PROAIR HFA) 108 (90 BASE) MCG/ACT inhaler Inhale 2 puffs into the lungs every 6 hours as needed for Wheezing 1 Inhaler 1    ACCU-CHEK FASTCLIX LANCETS MISC TEST ONE TIME DAILY 102 each 3     No current facility-administered medications for this visit. Review of Systems:  · Constitutional: no unanticipated weight loss. There's been no change in energy level, sleep pattern, or activity level. No fevers, chills. · Eyes: No visual changes or diplopia. No scleral icterus. · ENT: No Headaches, hearing loss or vertigo. No mouth sores or sore throat. · Cardiovascular: as reviewed in HPI  · Respiratory: No cough or wheezing, no sputum production. No hematemesis.     · Gastrointestinal: No abdominal pain, Follow up in 1 year. Thank you very much for allowing me to participate in the care of your patient. Please do not hesitate to contact me if you have any questions. Sincerely,  Zoe Ayala [employer], [address] [place] [address], [facility] PauOlvin Guerrero [facility]  Ph: [phone]  Fax: [phone]

## 2018-02-28 NOTE — PATIENT INSTRUCTIONS
Patient Education        Stopping Smoking: Care Instructions  Your Care Instructions    Cigarette smokers crave the nicotine in cigarettes. Giving it up is much harder than simply changing a habit. Your body has to stop craving the nicotine. It is hard to quit, but you can do it. There are many tools that people use to quit smoking. You may find that combining tools works best for you. There are several steps to quitting. First you get ready to quit. Then you get support to help you. After that, you learn new skills and behaviors to become a nonsmoker. For many people, a necessary step is getting and using medicine. Your doctor will help you set up the plan that best meets your needs. You may want to attend a smoking cessation program to help you quit smoking. When you choose a program, look for one that has proven success. Ask your doctor for ideas. You will greatly increase your chances of success if you take medicine as well as get counseling or join a cessation program.  Some of the changes you feel when you first quit tobacco are uncomfortable. Your body will miss the nicotine at first, and you may feel short-tempered and grumpy. You may have trouble sleeping or concentrating. Medicine can help you deal with these symptoms. You may struggle with changing your smoking habits and rituals. The last step is the tricky one: Be prepared for the smoking urge to continue for a time. This is a lot to deal with, but keep at it. You will feel better. Follow-up care is a key part of your treatment and safety. Be sure to make and go to all appointments, and call your doctor if you are having problems. It's also a good idea to know your test results and keep a list of the medicines you take. How can you care for yourself at home? · Ask your family, friends, and coworkers for support. You have a better chance of quitting if you have help and support.   · Join a support group, such as Nicotine Anonymous, for people who are trying to quit smoking. · Consider signing up for a smoking cessation program, such as the American Lung Association's Freedom from Smoking program.  · Set a quit date. Pick your date carefully so that it is not right in the middle of a big deadline or stressful time. Once you quit, do not even take a puff. Get rid of all ashtrays and lighters after your last cigarette. Clean your house and your clothes so that they do not smell of smoke. · Learn how to be a nonsmoker. Think about ways you can avoid those things that make you reach for a cigarette. ¨ Avoid situations that put you at greatest risk for smoking. For some people, it is hard to have a drink with friends without smoking. For others, they might skip a coffee break with coworkers who smoke. ¨ Change your daily routine. Take a different route to work or eat a meal in a different place. · Cut down on stress. Calm yourself or release tension by doing an activity you enjoy, such as reading a book, taking a hot bath, or gardening. · Talk to your doctor or pharmacist about nicotine replacement therapy, which replaces the nicotine in your body. You still get nicotine but you do not use tobacco. Nicotine replacement products help you slowly reduce the amount of nicotine you need. These products come in several forms, many of them available over-the-counter:  ¨ Nicotine patches  ¨ Nicotine gum and lozenges  ¨ Nicotine inhaler  · Ask your doctor about bupropion (Wellbutrin) or varenicline (Chantix), which are prescription medicines. They do not contain nicotine. They help you by reducing withdrawal symptoms, such as stress and anxiety. · Some people find hypnosis, acupuncture, and massage helpful for ending the smoking habit. · Eat a healthy diet and get regular exercise. Having healthy habits will help your body move past its craving for nicotine. · Be prepared to keep trying. Most people are not successful the first few times they try to quit.  Do not get mad at yourself if you smoke again. Make a list of things you learned and think about when you want to try again, such as next week, next month, or next year. Where can you learn more? Go to https://johnna.[employer]. org and sign in to your [employer] account. Enter Q457 in the [employer] box to learn more about \"Stopping Smoking: Care Instructions. \"     If you do not have an account, please click on the \"Sign Up Now\" link. Current as of: March 20, 2017  Content Version: 11.5  © 0213-2063 Healthwise, Incorporated. Care instructions adapted under license by [facility] ([facility]). If you have questions about a medical condition or this instruction, always ask your healthcare professional. [address] any warranty or liability for your use of this information.

## 2018-03-12 ENCOUNTER — TELEPHONE (OUTPATIENT)
Dept: FAMILY MEDICINE CLINIC | Age: 58
End: 2018-03-12

## 2018-03-30 ENCOUNTER — OFFICE VISIT (OUTPATIENT)
Dept: FAMILY MEDICINE CLINIC | Age: 58
End: 2018-03-30

## 2018-03-30 VITALS
OXYGEN SATURATION: 97 % | HEIGHT: 66 IN | DIASTOLIC BLOOD PRESSURE: 78 MMHG | TEMPERATURE: 97.4 F | HEART RATE: 102 BPM | WEIGHT: 170.2 LBS | BODY MASS INDEX: 27.35 KG/M2 | RESPIRATION RATE: 16 BRPM | SYSTOLIC BLOOD PRESSURE: 122 MMHG

## 2018-03-30 DIAGNOSIS — E11.9 TYPE 2 DIABETES MELLITUS WITHOUT COMPLICATION, WITHOUT LONG-TERM CURRENT USE OF INSULIN (HCC): ICD-10-CM

## 2018-03-30 DIAGNOSIS — E11.9 TYPE 2 DIABETES MELLITUS WITHOUT COMPLICATION, WITHOUT LONG-TERM CURRENT USE OF INSULIN (HCC): Primary | ICD-10-CM

## 2018-03-30 DIAGNOSIS — K76.0 FATTY LIVER: ICD-10-CM

## 2018-03-30 LAB
ALBUMIN SERPL-MCNC: 4.9 G/DL (ref 3.4–5)
ALP BLD-CCNC: 149 U/L (ref 40–129)
ALT SERPL-CCNC: 69 U/L (ref 10–40)
AST SERPL-CCNC: 45 U/L (ref 15–37)
BILIRUB SERPL-MCNC: 0.4 MG/DL (ref 0–1)
BILIRUBIN DIRECT: <0.2 MG/DL (ref 0–0.3)
BILIRUBIN, INDIRECT: ABNORMAL MG/DL (ref 0–1)
CHOLESTEROL, TOTAL: 174 MG/DL (ref 0–199)
GLUCOSE BLD-MCNC: 113 MG/DL (ref 70–99)
HDLC SERPL-MCNC: 62 MG/DL (ref 40–60)
LDL CHOLESTEROL CALCULATED: 71 MG/DL
TOTAL PROTEIN: 7.2 G/DL (ref 6.4–8.2)
TRIGL SERPL-MCNC: 207 MG/DL (ref 0–150)
VLDLC SERPL CALC-MCNC: 41 MG/DL

## 2018-03-30 PROCEDURE — G8482 FLU IMMUNIZE ORDER/ADMIN: HCPCS | Performed by: FAMILY MEDICINE

## 2018-03-30 PROCEDURE — G8417 CALC BMI ABV UP PARAM F/U: HCPCS | Performed by: FAMILY MEDICINE

## 2018-03-30 PROCEDURE — 3045F PR MOST RECENT HEMOGLOBIN A1C LEVEL 7.0-9.0%: CPT | Performed by: FAMILY MEDICINE

## 2018-03-30 PROCEDURE — 3017F COLORECTAL CA SCREEN DOC REV: CPT | Performed by: FAMILY MEDICINE

## 2018-03-30 PROCEDURE — 99213 OFFICE O/P EST LOW 20 MIN: CPT | Performed by: FAMILY MEDICINE

## 2018-03-30 PROCEDURE — 3014F SCREEN MAMMO DOC REV: CPT | Performed by: FAMILY MEDICINE

## 2018-03-30 PROCEDURE — G8427 DOCREV CUR MEDS BY ELIG CLIN: HCPCS | Performed by: FAMILY MEDICINE

## 2018-03-30 PROCEDURE — 4004F PT TOBACCO SCREEN RCVD TLK: CPT | Performed by: FAMILY MEDICINE

## 2018-03-30 RX ORDER — HYDROXYZINE PAMOATE 25 MG/1
25 CAPSULE ORAL DAILY PRN
Qty: 14 CAPSULE | Refills: 0 | Status: SHIPPED | OUTPATIENT
Start: 2018-03-30 | End: 2018-04-05 | Stop reason: SDUPTHER

## 2018-03-30 NOTE — PATIENT INSTRUCTIONS
Do stop the tobacco  Remember to see the eye doctor  Use the drops for only 3 days and let me know if not improved  See back in 4 months

## 2018-03-31 LAB
ESTIMATED AVERAGE GLUCOSE: 171.4 MG/DL
HBA1C MFR BLD: 7.6 %

## 2018-04-05 RX ORDER — HYDROXYZINE PAMOATE 25 MG/1
25 CAPSULE ORAL DAILY PRN
Qty: 90 CAPSULE | Refills: 0 | Status: SHIPPED | OUTPATIENT
Start: 2018-04-05 | End: 2018-06-07 | Stop reason: ALTCHOICE

## 2018-06-07 ENCOUNTER — OFFICE VISIT (OUTPATIENT)
Dept: FAMILY MEDICINE CLINIC | Age: 58
End: 2018-06-07

## 2018-06-07 VITALS
TEMPERATURE: 98.2 F | WEIGHT: 171.2 LBS | HEIGHT: 66 IN | DIASTOLIC BLOOD PRESSURE: 80 MMHG | BODY MASS INDEX: 27.51 KG/M2 | HEART RATE: 100 BPM | SYSTOLIC BLOOD PRESSURE: 148 MMHG

## 2018-06-07 DIAGNOSIS — I10 ESSENTIAL HYPERTENSION: ICD-10-CM

## 2018-06-07 DIAGNOSIS — E11.9 TYPE 2 DIABETES MELLITUS WITHOUT COMPLICATION, WITHOUT LONG-TERM CURRENT USE OF INSULIN (HCC): ICD-10-CM

## 2018-06-07 DIAGNOSIS — Z72.0 TOBACCO ABUSE: ICD-10-CM

## 2018-06-07 DIAGNOSIS — G47.33 OBSTRUCTIVE SLEEP APNEA SYNDROME: ICD-10-CM

## 2018-06-07 DIAGNOSIS — E11.9 TYPE 2 DIABETES MELLITUS WITHOUT COMPLICATION, WITHOUT LONG-TERM CURRENT USE OF INSULIN (HCC): Primary | ICD-10-CM

## 2018-06-07 LAB
ANION GAP SERPL CALCULATED.3IONS-SCNC: 20 MMOL/L (ref 3–16)
BUN BLDV-MCNC: 10 MG/DL (ref 7–20)
CALCIUM SERPL-MCNC: 9.4 MG/DL (ref 8.3–10.6)
CHLORIDE BLD-SCNC: 102 MMOL/L (ref 99–110)
CO2: 20 MMOL/L (ref 21–32)
CREAT SERPL-MCNC: 0.6 MG/DL (ref 0.6–1.1)
GFR AFRICAN AMERICAN: >60
GFR NON-AFRICAN AMERICAN: >60
GLUCOSE BLD-MCNC: 335 MG/DL (ref 70–99)
POTASSIUM SERPL-SCNC: 4.3 MMOL/L (ref 3.5–5.1)
SODIUM BLD-SCNC: 142 MMOL/L (ref 136–145)

## 2018-06-07 PROCEDURE — 99213 OFFICE O/P EST LOW 20 MIN: CPT | Performed by: FAMILY MEDICINE

## 2018-06-07 PROCEDURE — 2022F DILAT RTA XM EVC RTNOPTHY: CPT | Performed by: FAMILY MEDICINE

## 2018-06-07 PROCEDURE — 4004F PT TOBACCO SCREEN RCVD TLK: CPT | Performed by: FAMILY MEDICINE

## 2018-06-07 PROCEDURE — G8417 CALC BMI ABV UP PARAM F/U: HCPCS | Performed by: FAMILY MEDICINE

## 2018-06-07 PROCEDURE — 3045F PR MOST RECENT HEMOGLOBIN A1C LEVEL 7.0-9.0%: CPT | Performed by: FAMILY MEDICINE

## 2018-06-07 PROCEDURE — G8427 DOCREV CUR MEDS BY ELIG CLIN: HCPCS | Performed by: FAMILY MEDICINE

## 2018-06-07 PROCEDURE — 3017F COLORECTAL CA SCREEN DOC REV: CPT | Performed by: FAMILY MEDICINE

## 2018-06-07 RX ORDER — BUDESONIDE AND FORMOTEROL FUMARATE DIHYDRATE 80; 4.5 UG/1; UG/1
2 AEROSOL RESPIRATORY (INHALATION) 2 TIMES DAILY
Qty: 1 INHALER | Refills: 0 | Status: SHIPPED | OUTPATIENT
Start: 2018-06-07 | End: 2018-09-27 | Stop reason: ALTCHOICE

## 2018-06-07 RX ORDER — TRAZODONE HYDROCHLORIDE 50 MG/1
100 TABLET ORAL 2 TIMES DAILY
Refills: 2 | COMMUNITY
Start: 2018-05-24

## 2018-06-08 LAB
ESTIMATED AVERAGE GLUCOSE: 168.6 MG/DL
HBA1C MFR BLD: 7.5 %

## 2018-06-11 ENCOUNTER — TELEPHONE (OUTPATIENT)
Dept: FAMILY MEDICINE CLINIC | Age: 58
End: 2018-06-11

## 2018-06-11 RX ORDER — DOXYCYCLINE HYCLATE 100 MG/1
100 CAPSULE ORAL 2 TIMES DAILY
Qty: 20 CAPSULE | Refills: 0 | Status: SHIPPED | OUTPATIENT
Start: 2018-06-11 | End: 2018-06-21

## 2018-06-14 ENCOUNTER — TELEPHONE (OUTPATIENT)
Dept: FAMILY MEDICINE CLINIC | Age: 58
End: 2018-06-14

## 2018-06-14 RX ORDER — PIOGLITAZONEHYDROCHLORIDE 15 MG/1
15 TABLET ORAL DAILY
Qty: 30 TABLET | Refills: 3 | Status: SHIPPED | OUTPATIENT
Start: 2018-06-14 | End: 2018-06-14 | Stop reason: SDUPTHER

## 2018-06-15 RX ORDER — PIOGLITAZONEHYDROCHLORIDE 15 MG/1
TABLET ORAL
Qty: 90 TABLET | Refills: 0 | Status: SHIPPED | OUTPATIENT
Start: 2018-06-15 | End: 2019-01-04 | Stop reason: SDUPTHER

## 2018-06-22 ENCOUNTER — OFFICE VISIT (OUTPATIENT)
Dept: FAMILY MEDICINE CLINIC | Age: 58
End: 2018-06-22

## 2018-06-22 ENCOUNTER — HOSPITAL ENCOUNTER (OUTPATIENT)
Dept: CT IMAGING | Age: 58
Discharge: HOME | End: 2018-06-22
Attending: FAMILY MEDICINE | Admitting: FAMILY MEDICINE

## 2018-06-22 VITALS
DIASTOLIC BLOOD PRESSURE: 82 MMHG | TEMPERATURE: 98 F | HEIGHT: 66 IN | WEIGHT: 169 LBS | BODY MASS INDEX: 27.16 KG/M2 | SYSTOLIC BLOOD PRESSURE: 136 MMHG | HEART RATE: 72 BPM

## 2018-06-22 DIAGNOSIS — R10.30 LOWER ABDOMINAL PAIN: Primary | ICD-10-CM

## 2018-06-22 DIAGNOSIS — R10.30 LOWER ABDOMINAL PAIN: ICD-10-CM

## 2018-06-22 DIAGNOSIS — M54.50 ACUTE MIDLINE LOW BACK PAIN WITHOUT SCIATICA: ICD-10-CM

## 2018-06-22 LAB
A/G RATIO: 1.5 (ref 1.1–2.2)
ALBUMIN SERPL-MCNC: 4.4 G/DL (ref 3.4–5)
ALP BLD-CCNC: 148 U/L (ref 40–129)
ALT SERPL-CCNC: 37 U/L (ref 10–40)
AMYLASE: 45 U/L (ref 25–115)
ANION GAP SERPL CALCULATED.3IONS-SCNC: 17 MMOL/L (ref 3–16)
AST SERPL-CCNC: 31 U/L (ref 15–37)
ATYPICAL LYMPHOCYTE RELATIVE PERCENT: 2 % (ref 0–6)
BANDED NEUTROPHILS RELATIVE PERCENT: 1 % (ref 0–7)
BASOPHILS ABSOLUTE: 0.2 K/UL (ref 0–0.2)
BASOPHILS RELATIVE PERCENT: 2 %
BILIRUB SERPL-MCNC: <0.2 MG/DL (ref 0–1)
BILIRUBIN URINE: NEGATIVE
BLOOD, URINE: NEGATIVE
BUN BLDV-MCNC: 12 MG/DL (ref 7–20)
CALCIUM SERPL-MCNC: 9.3 MG/DL (ref 8.3–10.6)
CHLORIDE BLD-SCNC: 105 MMOL/L (ref 99–110)
CLARITY: ABNORMAL
CO2: 21 MMOL/L (ref 21–32)
COLOR: ABNORMAL
CREAT SERPL-MCNC: 0.6 MG/DL (ref 0.6–1.1)
EOSINOPHILS ABSOLUTE: 0.1 K/UL (ref 0–0.6)
EOSINOPHILS RELATIVE PERCENT: 1 %
EPITHELIAL CELLS, UA: 0 /HPF (ref 0–5)
GFR AFRICAN AMERICAN: >60
GFR NON-AFRICAN AMERICAN: >60
GLOBULIN: 2.9 G/DL
GLUCOSE BLD-MCNC: 95 MG/DL (ref 70–99)
GLUCOSE URINE: NEGATIVE MG/DL
HCT VFR BLD CALC: 46 % (ref 36–48)
HEMOGLOBIN: 16 G/DL (ref 12–16)
HYALINE CASTS: 0 /LPF (ref 0–8)
KETONES, URINE: NEGATIVE MG/DL
LEUKOCYTE ESTERASE, URINE: NEGATIVE
LIPASE: 17 U/L (ref 13–60)
LYMPHOCYTES ABSOLUTE: 4.5 K/UL (ref 1–5.1)
LYMPHOCYTES RELATIVE PERCENT: 51 %
MCH RBC QN AUTO: 33.2 PG (ref 26–34)
MCHC RBC AUTO-ENTMCNC: 34.8 G/DL (ref 31–36)
MCV RBC AUTO: 95.4 FL (ref 80–100)
MICROSCOPIC EXAMINATION: YES
MONOCYTES ABSOLUTE: 0.3 K/UL (ref 0–1.3)
MONOCYTES RELATIVE PERCENT: 3 %
NEUTROPHILS ABSOLUTE: 3.4 K/UL (ref 1.7–7.7)
NEUTROPHILS RELATIVE PERCENT: 40 %
NITRITE, URINE: NEGATIVE
PDW BLD-RTO: 13.7 % (ref 12.4–15.4)
PH UA: 6
PLATELET # BLD: 212 K/UL (ref 135–450)
PMV BLD AUTO: 8.1 FL (ref 5–10.5)
POTASSIUM SERPL-SCNC: 4.2 MMOL/L (ref 3.5–5.1)
PROTEIN UA: NEGATIVE MG/DL
RBC # BLD: 4.82 M/UL (ref 4–5.2)
RBC # BLD: NORMAL 10*6/UL
RBC UA: 1 /HPF (ref 0–4)
SODIUM BLD-SCNC: 143 MMOL/L (ref 136–145)
SPECIFIC GRAVITY UA: 1.02
TOTAL PROTEIN: 7.3 G/DL (ref 6.4–8.2)
UROBILINOGEN, URINE: 0.2 E.U./DL
WBC # BLD: 8.4 K/UL (ref 4–11)
WBC UA: 0 /HPF (ref 0–5)

## 2018-06-22 PROCEDURE — 3017F COLORECTAL CA SCREEN DOC REV: CPT | Performed by: FAMILY MEDICINE

## 2018-06-22 PROCEDURE — 4004F PT TOBACCO SCREEN RCVD TLK: CPT | Performed by: FAMILY MEDICINE

## 2018-06-22 PROCEDURE — G8417 CALC BMI ABV UP PARAM F/U: HCPCS | Performed by: FAMILY MEDICINE

## 2018-06-22 PROCEDURE — G8427 DOCREV CUR MEDS BY ELIG CLIN: HCPCS | Performed by: FAMILY MEDICINE

## 2018-06-22 PROCEDURE — 99213 OFFICE O/P EST LOW 20 MIN: CPT | Performed by: FAMILY MEDICINE

## 2018-06-22 ASSESSMENT — PATIENT HEALTH QUESTIONNAIRE - PHQ9
SUM OF ALL RESPONSES TO PHQ QUESTIONS 1-9: 0
2. FEELING DOWN, DEPRESSED OR HOPELESS: 0
SUM OF ALL RESPONSES TO PHQ9 QUESTIONS 1 & 2: 0
1. LITTLE INTEREST OR PLEASURE IN DOING THINGS: 0

## 2018-06-28 ENCOUNTER — OFFICE VISIT (OUTPATIENT)
Dept: FAMILY MEDICINE CLINIC | Age: 58
End: 2018-06-28

## 2018-06-28 VITALS
TEMPERATURE: 97.9 F | HEIGHT: 66 IN | BODY MASS INDEX: 27.38 KG/M2 | DIASTOLIC BLOOD PRESSURE: 84 MMHG | SYSTOLIC BLOOD PRESSURE: 138 MMHG | WEIGHT: 170.4 LBS

## 2018-06-28 DIAGNOSIS — R93.2 ABNORMAL FINDINGS ON DIAGNOSTIC IMAGING OF LIVER: Primary | ICD-10-CM

## 2018-06-28 DIAGNOSIS — R59.9 LYMPH NODES ENLARGED: ICD-10-CM

## 2018-06-28 PROCEDURE — 99213 OFFICE O/P EST LOW 20 MIN: CPT | Performed by: FAMILY MEDICINE

## 2018-06-28 PROCEDURE — G8417 CALC BMI ABV UP PARAM F/U: HCPCS | Performed by: FAMILY MEDICINE

## 2018-06-28 PROCEDURE — G8427 DOCREV CUR MEDS BY ELIG CLIN: HCPCS | Performed by: FAMILY MEDICINE

## 2018-06-28 PROCEDURE — 4004F PT TOBACCO SCREEN RCVD TLK: CPT | Performed by: FAMILY MEDICINE

## 2018-06-28 PROCEDURE — 3017F COLORECTAL CA SCREEN DOC REV: CPT | Performed by: FAMILY MEDICINE

## 2018-07-25 ENCOUNTER — HOSPITAL ENCOUNTER (OUTPATIENT)
Dept: OTHER | Age: 58
Discharge: HOME | End: 2018-07-25
Attending: INTERNAL MEDICINE | Admitting: INTERNAL MEDICINE

## 2018-07-25 LAB
ALBUMIN SERPL-MCNC: 4.8 G/DL (ref 3.4–5)
ALP BLD-CCNC: 145 U/L (ref 40–129)
ALT SERPL-CCNC: 52 U/L (ref 10–40)
AST SERPL-CCNC: 51 U/L (ref 15–37)
BILIRUB SERPL-MCNC: 0.4 MG/DL (ref 0–1)
BILIRUBIN DIRECT: <0.2 MG/DL (ref 0–0.3)
BILIRUBIN, INDIRECT: ABNORMAL MG/DL (ref 0–1)
FERRITIN: 104 NG/ML (ref 15–150)
HAV IGM SER IA-ACNC: NORMAL
HBV SURFACE AB TITR SER: <3.5 MIU/ML
HEPATITIS B SURFACE ANTIGEN INTERPRETATION: NORMAL
HEPATITIS C ANTIBODY INTERPRETATION: NORMAL
IRON SATURATION: 32 % (ref 15–50)
IRON: 101 UG/DL (ref 37–145)
TOTAL IRON BINDING CAPACITY: 318 UG/DL (ref 260–445)
TOTAL PROTEIN: 7.7 G/DL (ref 6.4–8.2)

## 2018-07-26 LAB
ANA INTERPRETATION: NORMAL
ANTI-NUCLEAR ANTIBODY (ANA): NEGATIVE

## 2018-07-27 LAB
ALBUMIN SERPL-MCNC: 4 G/DL (ref 3.1–4.9)
ALPHA-1-GLOBULIN: 0.4 G/DL (ref 0.2–0.4)
ALPHA-2-GLOBULIN: 0.9 G/DL (ref 0.4–1.1)
BETA GLOBULIN: 1.5 G/DL (ref 0.9–1.6)
GAMMA GLOBULIN: 0.9 G/DL (ref 0.6–1.8)
HAV AB SERPL IA-ACNC: NEGATIVE
SPE/IFE INTERPRETATION: NORMAL

## 2018-07-28 LAB
AFP: 5.6 UG/L
CERULOPLASMIN: 29 MG/DL (ref 16–45)
F-ACTIN AB IGG: 4 UNITS (ref 0–19)
MITOCHONDRIAL M2 AB, IGG: 1.9 UNITS (ref 0–20)

## 2018-07-29 LAB
ALPHA-1 ANTITRYPSIN PHENOTYPE: NORMAL
ALPHA-1 ANTITRYPSIN: 183 MG/DL (ref 90–200)
MISCELLANEOUS LAB TEST ORDER: NORMAL

## 2018-08-08 ENCOUNTER — HOSPITAL ENCOUNTER (OUTPATIENT)
Dept: MAMMOGRAPHY | Age: 58
Discharge: HOME | End: 2018-08-08

## 2018-08-08 ENCOUNTER — HOSPITAL ENCOUNTER (OUTPATIENT)
Dept: MRI IMAGING | Age: 58
Discharge: HOME | End: 2018-08-08
Admitting: INTERNAL MEDICINE

## 2018-08-08 DIAGNOSIS — Z12.39 BREAST CANCER SCREENING: ICD-10-CM

## 2018-08-08 DIAGNOSIS — K76.89 OTHER SPECIFIED DISEASES OF LIVER: ICD-10-CM

## 2018-08-08 LAB
BUN BLDV-MCNC: 9 MG/DL (ref 7–20)
CREAT SERPL-MCNC: 0.7 MG/DL (ref 0.6–1.1)
GFR AFRICAN AMERICAN: >60
GFR NON-AFRICAN AMERICAN: >60

## 2018-08-08 RX ORDER — 0.9 % SODIUM CHLORIDE 0.9 %
20 VIAL (ML) INJECTION
Status: COMPLETED | OUTPATIENT
Start: 2018-08-08 | End: 2018-08-08

## 2018-08-08 RX ADMIN — Medication 20 ML: at 17:56

## 2018-08-16 ENCOUNTER — HOSPITAL ENCOUNTER (OUTPATIENT)
Dept: MAMMOGRAPHY | Age: 58
Discharge: HOME | End: 2018-08-16

## 2018-08-16 DIAGNOSIS — R92.8 ABNORMAL FINDING ON MAMMOGRAPHY: ICD-10-CM

## 2018-08-17 ENCOUNTER — TELEPHONE (OUTPATIENT)
Dept: FAMILY MEDICINE CLINIC | Age: 58
End: 2018-08-17

## 2018-08-17 NOTE — TELEPHONE ENCOUNTER
Jacquelyn had mammogram & US yesterday and they found a lesion in left breast which is probably benign. They recommended she has follow up testing in 6 months. She would like to have needle biopsy now due to her mom & 2 maternal cousins having breast cancer.       Please advise, [phone]

## 2018-08-23 ENCOUNTER — HOSPITAL ENCOUNTER (OUTPATIENT)
Dept: ENDOSCOPY | Age: 58
Discharge: HOME | End: 2018-08-23
Attending: INTERNAL MEDICINE | Admitting: FAMILY MEDICINE

## 2018-08-23 LAB
GLUCOSE BLD-MCNC: 117 MG/DL (ref 70–99)
GLUCOSE BLD-MCNC: 120 MG/DL (ref 70–99)
PERFORMED ON: ABNORMAL
PERFORMED ON: ABNORMAL

## 2018-08-23 RX ORDER — SULINDAC 200 MG/1
TABLET ORAL
Qty: 180 TABLET | Refills: 1 | Status: SHIPPED | OUTPATIENT
Start: 2018-08-23 | End: 2019-03-06 | Stop reason: ALTCHOICE

## 2018-08-23 RX ORDER — SODIUM CHLORIDE 0.9 % (FLUSH) 0.9 %
10 SYRINGE (ML) INJECTION EVERY 12 HOURS SCHEDULED
Status: DISCONTINUED | OUTPATIENT
Start: 2018-08-23 | End: 2018-08-24 | Stop reason: HOSPADM

## 2018-08-23 RX ORDER — MONTELUKAST SODIUM 10 MG/1
TABLET ORAL
Qty: 90 TABLET | Refills: 1 | Status: SHIPPED | OUTPATIENT
Start: 2018-08-23 | End: 2019-08-31 | Stop reason: SDUPTHER

## 2018-08-23 RX ORDER — SODIUM CHLORIDE 0.9 % (FLUSH) 0.9 %
10 SYRINGE (ML) INJECTION PRN
Status: DISCONTINUED | OUTPATIENT
Start: 2018-08-23 | End: 2018-08-24 | Stop reason: HOSPADM

## 2018-08-23 RX ORDER — SODIUM CHLORIDE 9 MG/ML
INJECTION, SOLUTION INTRAVENOUS CONTINUOUS
Status: DISCONTINUED | OUTPATIENT
Start: 2018-08-23 | End: 2018-08-24 | Stop reason: HOSPADM

## 2018-08-23 RX ORDER — PRAVASTATIN SODIUM 40 MG
TABLET ORAL
Qty: 90 TABLET | Refills: 1 | Status: SHIPPED | OUTPATIENT
Start: 2018-08-23 | End: 2019-05-31 | Stop reason: SDUPTHER

## 2018-08-23 RX ORDER — METFORMIN HYDROCHLORIDE 500 MG/1
TABLET, EXTENDED RELEASE ORAL
Qty: 180 TABLET | Refills: 1 | Status: SHIPPED | OUTPATIENT
Start: 2018-08-23 | End: 2018-09-27 | Stop reason: SDUPTHER

## 2018-08-23 ASSESSMENT — LIFESTYLE VARIABLES: SMOKING_STATUS: 1

## 2018-08-23 NOTE — ANESTHESIA PRE-OP
Department of Anesthesiology  Preprocedure Note       Name:  Papa Calderón   Age:  59 y.o.  :  1960                                          MRN:  [id number]         Date:  2018      Surgeon:    Procedure:    Medications prior to admission:   Prior to Admission medications    Medication Sig Start Date End Date Taking?  Authorizing Provider   pioglitazone (ACTOS) 15 MG tablet TAKE 1 TABLET BY MOUTH ONCE EVERY DAY 6/15/18   Krystal Kimble MD   Blood Glucose Monitoring Suppl JOSSELYN 1 each by Does not apply route daily 18   Krystal Kimble MD   traZODone (DESYREL) 50 MG tablet Take 50 mg by mouth every evening 18   Historical Provider, MD   budesonide-formoterol (SYMBICORT) 80-4.5 MCG/ACT AERO Inhale 2 puffs into the lungs 2 times daily 18   Krystal Kimble MD   metFORMIN (GLUCOPHAGE-XR) 500 MG extended release tablet Take 2 tablets by mouth 2 times daily (with meals) 18   Krystal Kimble MD   glucose blood VI test strips (ACCU-CHEK SMARTVIEW) strip Apply 1 each topically daily 18   Krystal Kimble MD   pravastatin (PRAVACHOL) 40 MG tablet TAKE 1 TABLET EVERY DAY 18   Krystal Kimble MD   sulindac (CLINORIL) 200 MG tablet TAKE 1 TABLET TWICE DAILY WITH MEALS 18   Krystal Kimble MD   Cyanocobalamin (VITAMIN B 12 PO) Take by mouth daily    Historical Provider, MD   cyclobenzaprine (FLEXERIL) 10 MG tablet  17   Historical Provider, MD   ALPRAZolam (XANAX PO) Take by mouth    Shelby Helm MD   venlafaxine (EFFEXOR XR) 150 MG extended release capsule TAKE 1 CAPSULE EVERY DAY 17   Krystal Kimble MD   montelukast (SINGULAIR) 10 MG tablet TAKE 1 TABLET EVERY DAY 10/23/17   Krystal Kimble MD   losartan (COZAAR) 50 MG tablet TAKE 1 TABLET EVERY DAY 10/23/17   Buddy Nelson MD   albuterol (PROVENTIL) (2.5 MG/3ML) 0.083% nebulizer solution Use one vial every 6 hours in nebulizer as needed 10/24/16   Krystal Kimble MD   Multiple Vitamins-Minerals (MULTIVITAMIN ADULT PO) Take 1 tablet by mouth lumbar     WRIST SURGERY  3/2003    repair of left wrist torn ligament-tenosynovitis release right        Social History:    Social History   Substance Use Topics    Smoking status: Current Every Day Smoker     Packs/day: 0.75     Years: 30.00     Start date: 3/14/1975    Smokeless tobacco: Never Used      Comment: 1 cig per day, vaporing instead    Alcohol use No                                Ready to quit: Not Answered  Counseling given: Not Answered      Vital Signs (Current): There were no vitals filed for this visit. BP Readings from Last 3 Encounters:   06/28/18 138/84   06/22/18 136/82   06/07/18 (!) 148/80       NPO Status:   more than 8 hrs                                                                             BMI:   Wt Readings from Last 3 Encounters:   06/28/18 170 lb 6.4 oz (77.3 kg)   06/22/18 169 lb (76.7 kg)   06/07/18 171 lb 3.2 oz (77.7 kg)     There is no height or weight on file to calculate BMI. Anesthesia Evaluation  Patient summary reviewed history of anesthetic complications (wakes up during sedation):    Airway: Mallampati: II  TM distance: >3 FB   Neck ROM: limited  Mouth opening: > = 3 FB Dental:    (+) edentulous      Pulmonary:   (+) sleep apnea:  asthma (no longer taking symbicort, rarely uses albuterol): current smoker    (-) rhonchi and wheezes                          ROS comment: 2 puffs albuterol prior to procedure as prophylaxis   Cardiovascular:  Exercise tolerance: good (>4 METS),   (+) hypertension:,     (-) CABG/stent and  angina      Rhythm: regular  Rate: normal                 ROS comment: 2/2018 echo EF 65%, mod AR, mild TR  Normally has high resting HR per pt     Neuro/Psych:      (-) seizures, TIA and CVA           GI/Hepatic/Renal:   (+) GERD (no symptoms today):, liver disease:,           Endo/Other:    (+) Diabetes, : arthritis:., .                 Abdominal:           Vascular:

## 2018-08-27 ENCOUNTER — TELEPHONE (OUTPATIENT)
Dept: SURGERY | Age: 58
End: 2018-08-27

## 2018-08-27 ENCOUNTER — OFFICE VISIT (OUTPATIENT)
Dept: BREAST CENTER | Age: 58
End: 2018-08-27

## 2018-08-27 VITALS
BODY MASS INDEX: 26.53 KG/M2 | HEIGHT: 67 IN | HEART RATE: 86 BPM | DIASTOLIC BLOOD PRESSURE: 71 MMHG | WEIGHT: 169 LBS | SYSTOLIC BLOOD PRESSURE: 138 MMHG

## 2018-08-27 DIAGNOSIS — Z80.3 FAMILY HISTORY OF BREAST CANCER IN MOTHER: ICD-10-CM

## 2018-08-27 DIAGNOSIS — R92.8 ABNORMAL MAMMOGRAM OF LEFT BREAST: Primary | ICD-10-CM

## 2018-08-27 PROCEDURE — 99203 OFFICE O/P NEW LOW 30 MIN: CPT | Performed by: SURGERY

## 2018-08-27 PROCEDURE — 3017F COLORECTAL CA SCREEN DOC REV: CPT | Performed by: SURGERY

## 2018-08-27 PROCEDURE — 4004F PT TOBACCO SCREEN RCVD TLK: CPT | Performed by: SURGERY

## 2018-08-27 PROCEDURE — G8417 CALC BMI ABV UP PARAM F/U: HCPCS | Performed by: SURGERY

## 2018-08-27 PROCEDURE — G8427 DOCREV CUR MEDS BY ELIG CLIN: HCPCS | Performed by: SURGERY

## 2018-08-27 ASSESSMENT — ENCOUNTER SYMPTOMS
COUGH: 0
COLOR CHANGE: 0
BLOOD IN STOOL: 0
BACK PAIN: 0
DIARRHEA: 0
VOMITING: 0
ABDOMINAL PAIN: 0
ABDOMINAL DISTENTION: 0
NAUSEA: 0
RECTAL PAIN: 0
SHORTNESS OF BREATH: 0
TROUBLE SWALLOWING: 0
CONSTIPATION: 0

## 2018-08-27 NOTE — PATIENT INSTRUCTIONS
Past medical history was reviewed  Test results were discussed  Breast exam was unremarkable for any masses  Continue with monthly self checks   Return to office in 6 months with ultrasound of the left breast and office visit

## 2018-08-27 NOTE — TELEPHONE ENCOUNTER
Breast History:  History of Previous Breast Biopsy:yes Right breast () axillary (lumph node) biopsy   Self Breast Exams Completed:yes-  Family History of Breast Cancer:yes-  Mother breast cancer 61 current alive at 80  Maternal cousin diag at 61 current alive at 77  Maternal cousin diag at 54 current alive at 61    Age of Diagnosis:   Age of Death or Current Age:   Family History of Other Cancers:yes-  Maternal aunt  leukemia  Maternal aunt  lung cancer  Maternal cousin alive with lymphoma  Paternal grandfather - bladder cancer   Age of Diagnosis:    Age of Death or Current Age:   Ashkenazi Religious Decent:no  Bra Size: 39 B    Gyne History:  : 9,   Para: 3  Age of Menarche: 13 years  Age of Menopause:  years   Age of first pregnancy: 14 years  Age of first live Birth: 14 years  Breast Feeding (total time): no  History of Hysterectomy / DILMA-BSO: Yes (total) age of 20  History of OCP's/HRT:Yes 15 years   When and how long:    History of Ovarian Cancer: no   What age:    Family History of Ovarian Cancer: no   Age of death or current age:   History of Gyne Surgery: yes-

## 2018-08-27 NOTE — PROGRESS NOTES
pain, frequency and hematuria. Musculoskeletal: Negative for arthralgias, back pain, neck pain and neck stiffness. Skin: Negative for color change, rash and wound. Neurological: Negative for dizziness, weakness, numbness and headaches. Hematological: Negative for adenopathy. Does not bruise/bleed easily. Psychiatric/Behavioral: Negative for confusion and sleep disturbance. All other systems reviewed and are negative. Objective:   Physical Exam   Constitutional: She is oriented to person, place, and time. She appears well-developed and well-nourished. No distress. HENT:   Head: Normocephalic and atraumatic. Neck: Normal range of motion. No tracheal deviation present. Cardiovascular: Normal rate. Pulmonary/Chest: Effort normal. No respiratory distress. She has no wheezes. Abdominal: Soft. She exhibits no distension. There is no hepatosplenomegaly. Musculoskeletal: Normal range of motion. She exhibits no edema or tenderness. Lymphadenopathy:     She has no cervical adenopathy. She has no axillary adenopathy. Neurological: She is alert and oriented to person, place, and time. No cranial nerve deficit. Coordination normal.   Skin: Skin is warm and dry. No rash noted. She is not diaphoretic. No erythema. No pallor. Psychiatric: She has a normal mood and affect. Her behavior is normal. Judgment and thought content normal.   bilateral breasts - no masses, no skin or nipple changes    Assessment:       Diagnosis Orders   1. Abnormal mammogram of left breast     2. Family history of breast cancer in mother               Plan:      No masses on exam. I agree with radiology's recommendations. Continue monthly self breast exam, f/u with me in 6 months with left breast u/s.         Nelli Guerrero MD

## 2018-08-28 RX ORDER — LOSARTAN POTASSIUM 50 MG/1
TABLET ORAL
Qty: 90 TABLET | Refills: 3 | Status: SHIPPED | OUTPATIENT
Start: 2018-08-28 | End: 2019-09-03 | Stop reason: SDUPTHER

## 2018-09-17 ENCOUNTER — OFFICE VISIT (OUTPATIENT)
Dept: FAMILY MEDICINE CLINIC | Age: 58
End: 2018-09-17

## 2018-09-17 VITALS
BODY MASS INDEX: 26.68 KG/M2 | DIASTOLIC BLOOD PRESSURE: 70 MMHG | SYSTOLIC BLOOD PRESSURE: 138 MMHG | TEMPERATURE: 98.1 F | HEIGHT: 67 IN | WEIGHT: 170 LBS

## 2018-09-17 DIAGNOSIS — M25.552 PAIN OF LEFT HIP JOINT: Primary | ICD-10-CM

## 2018-09-17 PROCEDURE — 99213 OFFICE O/P EST LOW 20 MIN: CPT | Performed by: FAMILY MEDICINE

## 2018-09-17 PROCEDURE — G8417 CALC BMI ABV UP PARAM F/U: HCPCS | Performed by: FAMILY MEDICINE

## 2018-09-17 PROCEDURE — 4004F PT TOBACCO SCREEN RCVD TLK: CPT | Performed by: FAMILY MEDICINE

## 2018-09-17 PROCEDURE — G8427 DOCREV CUR MEDS BY ELIG CLIN: HCPCS | Performed by: FAMILY MEDICINE

## 2018-09-17 PROCEDURE — 3017F COLORECTAL CA SCREEN DOC REV: CPT | Performed by: FAMILY MEDICINE

## 2018-09-17 RX ORDER — PREDNISONE 10 MG/1
TABLET ORAL
Qty: 12 TABLET | Refills: 0 | Status: SHIPPED | OUTPATIENT
Start: 2018-09-17 | End: 2018-09-27 | Stop reason: ALTCHOICE

## 2018-09-17 NOTE — PROGRESS NOTES
1 TABLET EVERY DAY, Disp: 90 tablet, Rfl: 1  montelukast (SINGULAIR) 10 MG tablet, TAKE 1 TABLET EVERY DAY, Disp: 90 tablet, Rfl: 1  Blood Glucose Monitoring Suppl JOSSELYN, 1 each by Does not apply route daily, Disp: 1 Device, Rfl: 0  traZODone (DESYREL) 50 MG tablet, Take 100 mg by mouth every evening , Disp: , Rfl: 2  budesonide-formoterol (SYMBICORT) 80-4.5 MCG/ACT AERO, Inhale 2 puffs into the lungs 2 times daily, Disp: 1 Inhaler, Rfl: 0  glucose blood VI test strips (ACCU-CHEK SMARTVIEW) strip, Apply 1 each topically daily, Disp: 100 strip, Rfl: 3  Cyanocobalamin (VITAMIN B 12 PO), Take by mouth daily, Disp: , Rfl:   cyclobenzaprine (FLEXERIL) 10 MG tablet, , Disp: , Rfl:   ALPRAZolam (XANAX PO), Take by mouth, Disp: , Rfl:   venlafaxine (EFFEXOR XR) 150 MG extended release capsule, TAKE 1 CAPSULE EVERY DAY, Disp: 90 capsule, Rfl: 1  albuterol (PROVENTIL) (2.5 MG/3ML) 0.083% nebulizer solution, Use one vial every 6 hours in nebulizer as needed, Disp: 120 vial, Rfl: 2  Multiple Vitamins-Minerals (MULTIVITAMIN ADULT PO), Take 1 tablet by mouth, Disp: , Rfl:   albuterol sulfate HFA (PROAIR HFA) 108 (90 BASE) MCG/ACT inhaler, Inhale 2 puffs into the lungs every 6 hours as needed for Wheezing, Disp: 1 Inhaler, Rfl: 1  ACCU-CHEK FASTCLIX LANCETS [facility], TEST ONE TIME DAILY, Disp: 102 each, Rfl: 3   pioglitazone (ACTOS) 15 MG tablet, TAKE 1 TABLET BY MOUTH ONCE EVERY DAY, Disp: 90 tablet, Rfl: 0    No current facility-administered medications for this visit.       ---------------------------               09/17/18                      1555         ---------------------------   BP:          138/70         Site:    Left Upper Arm     Position:     Sitting        Cuff Size:   Large Adult      Temp:   98.1 °F (36.7 °C)   TempSrc:      Oral          Weight: 170 lb (77.1 kg)    Height: 5' 6.5\" (1.689 m)  ---------------------------  Body mass index is 27.03 kg/m².      Wt Readings from Last 3

## 2018-09-26 ENCOUNTER — TELEPHONE (OUTPATIENT)
Dept: FAMILY MEDICINE CLINIC | Age: 58
End: 2018-09-26

## 2018-09-26 NOTE — TELEPHONE ENCOUNTER
Referral done thru [facility]    Dr. Selena Farnsworth  Certification Number [id number]  Valid 8- to 9-  99 visits    Dr. Sandip You  Certification Number [id number]  Valid 0- to 9-  99 visits

## 2018-09-27 ENCOUNTER — OFFICE VISIT (OUTPATIENT)
Dept: FAMILY MEDICINE CLINIC | Age: 58
End: 2018-09-27
Payer: MEDICARE

## 2018-09-27 VITALS
SYSTOLIC BLOOD PRESSURE: 130 MMHG | HEIGHT: 67 IN | HEART RATE: 76 BPM | TEMPERATURE: 97.8 F | DIASTOLIC BLOOD PRESSURE: 80 MMHG | WEIGHT: 170 LBS | BODY MASS INDEX: 26.68 KG/M2

## 2018-09-27 DIAGNOSIS — Z23 NEEDS FLU SHOT: ICD-10-CM

## 2018-09-27 DIAGNOSIS — E11.9 TYPE 2 DIABETES MELLITUS WITHOUT COMPLICATION, WITHOUT LONG-TERM CURRENT USE OF INSULIN (HCC): Primary | ICD-10-CM

## 2018-09-27 DIAGNOSIS — E11.9 TYPE 2 DIABETES MELLITUS WITHOUT COMPLICATION, WITHOUT LONG-TERM CURRENT USE OF INSULIN (HCC): ICD-10-CM

## 2018-09-27 LAB — GLUCOSE BLD-MCNC: 210 MG/DL (ref 70–99)

## 2018-09-27 PROCEDURE — 4004F PT TOBACCO SCREEN RCVD TLK: CPT | Performed by: FAMILY MEDICINE

## 2018-09-27 PROCEDURE — G8427 DOCREV CUR MEDS BY ELIG CLIN: HCPCS | Performed by: FAMILY MEDICINE

## 2018-09-27 PROCEDURE — 3017F COLORECTAL CA SCREEN DOC REV: CPT | Performed by: FAMILY MEDICINE

## 2018-09-27 PROCEDURE — 2022F DILAT RTA XM EVC RTNOPTHY: CPT | Performed by: FAMILY MEDICINE

## 2018-09-27 PROCEDURE — 90686 IIV4 VACC NO PRSV 0.5 ML IM: CPT | Performed by: FAMILY MEDICINE

## 2018-09-27 PROCEDURE — 99213 OFFICE O/P EST LOW 20 MIN: CPT | Performed by: FAMILY MEDICINE

## 2018-09-27 PROCEDURE — G8417 CALC BMI ABV UP PARAM F/U: HCPCS | Performed by: FAMILY MEDICINE

## 2018-09-27 PROCEDURE — 3045F PR MOST RECENT HEMOGLOBIN A1C LEVEL 7.0-9.0%: CPT | Performed by: FAMILY MEDICINE

## 2018-09-27 PROCEDURE — G0008 ADMIN INFLUENZA VIRUS VAC: HCPCS | Performed by: FAMILY MEDICINE

## 2018-09-27 RX ORDER — METFORMIN HYDROCHLORIDE 500 MG/1
1000 TABLET, EXTENDED RELEASE ORAL 2 TIMES DAILY WITH MEALS
Qty: 180 TABLET | Refills: 1
Start: 2018-09-27 | End: 2019-01-03 | Stop reason: SDUPTHER

## 2018-09-27 ASSESSMENT — ENCOUNTER SYMPTOMS
ABDOMINAL PAIN: 0
SHORTNESS OF BREATH: 0
BLOOD IN STOOL: 0

## 2018-09-28 LAB
ESTIMATED AVERAGE GLUCOSE: 148.5 MG/DL
HBA1C MFR BLD: 6.8 %

## 2018-10-05 ENCOUNTER — HOSPITAL ENCOUNTER (OUTPATIENT)
Age: 58
Discharge: HOME | End: 2018-10-05
Payer: MEDICARE

## 2018-10-05 LAB
CREAT SERPL-MCNC: 0.6 MG/DL (ref 0.6–1.1)
GFR AFRICAN AMERICAN: >60
GFR NON-AFRICAN AMERICAN: >60
HCT VFR BLD CALC: 43.1 % (ref 36–48)
HEMOGLOBIN: 14.6 G/DL (ref 12–16)
MCH RBC QN AUTO: 33 PG (ref 26–34)
MCHC RBC AUTO-ENTMCNC: 33.9 G/DL (ref 31–36)
MCV RBC AUTO: 97.3 FL (ref 80–100)
PDW BLD-RTO: 14.1 % (ref 12.4–15.4)
PLATELET # BLD: 165 K/UL (ref 135–450)
PMV BLD AUTO: 8.2 FL (ref 5–10.5)
RBC # BLD: 4.43 M/UL (ref 4–5.2)
WBC # BLD: 7.7 K/UL (ref 4–11)

## 2018-10-05 PROCEDURE — 36415 COLL VENOUS BLD VENIPUNCTURE: CPT

## 2018-10-05 PROCEDURE — 85027 COMPLETE CBC AUTOMATED: CPT

## 2018-10-05 PROCEDURE — 82565 ASSAY OF CREATININE: CPT

## 2018-11-27 ENCOUNTER — OFFICE VISIT (OUTPATIENT)
Dept: FAMILY MEDICINE CLINIC | Age: 58
End: 2018-11-27
Payer: MEDICARE

## 2018-11-27 VITALS
HEART RATE: 80 BPM | WEIGHT: 166.8 LBS | DIASTOLIC BLOOD PRESSURE: 80 MMHG | HEIGHT: 67 IN | TEMPERATURE: 97.5 F | SYSTOLIC BLOOD PRESSURE: 126 MMHG | BODY MASS INDEX: 26.18 KG/M2

## 2018-11-27 DIAGNOSIS — M54.42 CHRONIC LEFT-SIDED LOW BACK PAIN WITH LEFT-SIDED SCIATICA: ICD-10-CM

## 2018-11-27 DIAGNOSIS — I10 ESSENTIAL HYPERTENSION: ICD-10-CM

## 2018-11-27 DIAGNOSIS — Z72.0 TOBACCO ABUSE: ICD-10-CM

## 2018-11-27 DIAGNOSIS — G89.29 CHRONIC LEFT-SIDED LOW BACK PAIN WITH LEFT-SIDED SCIATICA: ICD-10-CM

## 2018-11-27 DIAGNOSIS — E11.9 TYPE 2 DIABETES MELLITUS WITHOUT COMPLICATION, WITHOUT LONG-TERM CURRENT USE OF INSULIN (HCC): ICD-10-CM

## 2018-11-27 DIAGNOSIS — Z01.818 PREOP EXAMINATION: Primary | ICD-10-CM

## 2018-11-27 DIAGNOSIS — G47.33 OBSTRUCTIVE SLEEP APNEA SYNDROME: ICD-10-CM

## 2018-11-27 DIAGNOSIS — I42.8 NONISCHEMIC CARDIOMYOPATHY (HCC): ICD-10-CM

## 2018-11-27 DIAGNOSIS — M06.9 RHEUMATOID ARTHRITIS, INVOLVING UNSPECIFIED SITE, UNSPECIFIED RHEUMATOID FACTOR PRESENCE: ICD-10-CM

## 2018-11-27 PROCEDURE — G8427 DOCREV CUR MEDS BY ELIG CLIN: HCPCS | Performed by: FAMILY MEDICINE

## 2018-11-27 PROCEDURE — 2022F DILAT RTA XM EVC RTNOPTHY: CPT | Performed by: FAMILY MEDICINE

## 2018-11-27 PROCEDURE — 3017F COLORECTAL CA SCREEN DOC REV: CPT | Performed by: FAMILY MEDICINE

## 2018-11-27 PROCEDURE — G8417 CALC BMI ABV UP PARAM F/U: HCPCS | Performed by: FAMILY MEDICINE

## 2018-11-27 PROCEDURE — G8482 FLU IMMUNIZE ORDER/ADMIN: HCPCS | Performed by: FAMILY MEDICINE

## 2018-11-27 PROCEDURE — 4004F PT TOBACCO SCREEN RCVD TLK: CPT | Performed by: FAMILY MEDICINE

## 2018-11-27 PROCEDURE — 99214 OFFICE O/P EST MOD 30 MIN: CPT | Performed by: FAMILY MEDICINE

## 2018-11-27 PROCEDURE — 3044F HG A1C LEVEL LT 7.0%: CPT | Performed by: FAMILY MEDICINE

## 2018-11-27 ASSESSMENT — ENCOUNTER SYMPTOMS
EYE PAIN: 0
ABDOMINAL PAIN: 0
ROS SKIN COMMENTS: NO LESION
BLOOD IN STOOL: 0
COUGH: 0
VOMITING: 0
SHORTNESS OF BREATH: 0
VOICE CHANGE: 0
CONSTIPATION: 0
ABDOMINAL DISTENTION: 0
SORE THROAT: 0
CHEST TIGHTNESS: 0
TROUBLE SWALLOWING: 0
NAUSEA: 0
DIARRHEA: 0

## 2018-11-28 ENCOUNTER — OFFICE VISIT (OUTPATIENT)
Dept: CARDIOLOGY CLINIC | Age: 58
End: 2018-11-28
Payer: MEDICARE

## 2018-11-28 VITALS
DIASTOLIC BLOOD PRESSURE: 76 MMHG | WEIGHT: 165 LBS | BODY MASS INDEX: 25.9 KG/M2 | HEART RATE: 108 BPM | OXYGEN SATURATION: 97 % | HEIGHT: 67 IN | SYSTOLIC BLOOD PRESSURE: 138 MMHG

## 2018-11-28 DIAGNOSIS — Z01.810 PREOP CARDIOVASCULAR EXAM: ICD-10-CM

## 2018-11-28 DIAGNOSIS — I35.1 NONRHEUMATIC AORTIC VALVE INSUFFICIENCY: ICD-10-CM

## 2018-11-28 DIAGNOSIS — E78.2 MIXED HYPERLIPIDEMIA: ICD-10-CM

## 2018-11-28 DIAGNOSIS — I42.8 NONISCHEMIC CARDIOMYOPATHY (HCC): Primary | ICD-10-CM

## 2018-11-28 DIAGNOSIS — I10 ESSENTIAL HYPERTENSION: ICD-10-CM

## 2018-11-28 DIAGNOSIS — F17.210 CIGARETTE NICOTINE DEPENDENCE WITHOUT COMPLICATION: ICD-10-CM

## 2018-11-28 PROCEDURE — G8417 CALC BMI ABV UP PARAM F/U: HCPCS | Performed by: INTERNAL MEDICINE

## 2018-11-28 PROCEDURE — 4004F PT TOBACCO SCREEN RCVD TLK: CPT | Performed by: INTERNAL MEDICINE

## 2018-11-28 PROCEDURE — 3017F COLORECTAL CA SCREEN DOC REV: CPT | Performed by: INTERNAL MEDICINE

## 2018-11-28 PROCEDURE — G8427 DOCREV CUR MEDS BY ELIG CLIN: HCPCS | Performed by: INTERNAL MEDICINE

## 2018-11-28 PROCEDURE — 93000 ELECTROCARDIOGRAM COMPLETE: CPT | Performed by: INTERNAL MEDICINE

## 2018-11-28 PROCEDURE — G8482 FLU IMMUNIZE ORDER/ADMIN: HCPCS | Performed by: INTERNAL MEDICINE

## 2018-11-28 PROCEDURE — 99214 OFFICE O/P EST MOD 30 MIN: CPT | Performed by: INTERNAL MEDICINE

## 2018-11-28 RX ORDER — CYCLOBENZAPRINE HCL 10 MG
10 TABLET ORAL 2 TIMES DAILY PRN
COMMUNITY
End: 2019-10-04 | Stop reason: SDUPTHER

## 2018-11-28 RX ORDER — HYDROCODONE BITARTRATE AND ACETAMINOPHEN 5; 325 MG/1; MG/1
1 TABLET ORAL EVERY 6 HOURS PRN
COMMUNITY
End: 2021-11-01 | Stop reason: ALTCHOICE

## 2018-11-28 NOTE — PATIENT INSTRUCTIONS
Patient Education        Heart Failure: Care Instructions  Your Care Instructions    Heart failure occurs when your heart does not pump as much blood as the body needs. Failure does not mean that the heart has stopped pumping but rather that it is not pumping as well as it should. Over time, this causes fluid buildup in your lungs and other parts of your body. Fluid buildup can cause shortness of breath, fatigue, swollen ankles, and other problems. By taking medicines regularly, reducing sodium (salt) in your diet, checking your weight every day, and making lifestyle changes, you can feel better and live longer. Follow-up care is a key part of your treatment and safety. Be sure to make and go to all appointments, and call your doctor if you are having problems. It's also a good idea to know your test results and keep a list of the medicines you take. How can you care for yourself at home? Medicines    · Be safe with medicines. Take your medicines exactly as prescribed. Call your doctor if you think you are having a problem with your medicine.     · Do not take any vitamins, over-the-counter medicine, or herbal products without talking to your doctor first. Arabella Hudsoning not take ibuprofen (Advil or Motrin) and naproxen (Aleve) without talking to your doctor first. They could make your heart failure worse.     · You may be taking some of the following medicine. ? Beta-blockers can slow heart rate, decrease blood pressure, and improve your condition. Taking a beta-blocker may lower your chance of needing to be hospitalized. ? Angiotensin-converting enzyme inhibitors (ACEIs) reduce the heart's workload, lower blood pressure, and reduce swelling. Taking an ACEI may lower your chance of needing to be hospitalized again. ? Angiotensin II receptor blockers (ARBs) work like ACEIs. Your doctor may prescribe them instead of ACEIs. ? Diuretics, also called water pills, reduce swelling. ?  Potassium supplements replace this

## 2019-01-03 RX ORDER — METFORMIN HYDROCHLORIDE 500 MG/1
1000 TABLET, EXTENDED RELEASE ORAL 2 TIMES DAILY WITH MEALS
Qty: 360 TABLET | Refills: 1 | Status: SHIPPED | OUTPATIENT
Start: 2019-01-03 | End: 2019-01-29 | Stop reason: DRUGHIGH

## 2019-01-04 RX ORDER — PIOGLITAZONEHYDROCHLORIDE 15 MG/1
15 TABLET ORAL DAILY
Qty: 90 TABLET | Refills: 1 | Status: SHIPPED | OUTPATIENT
Start: 2019-01-04 | End: 2019-01-29 | Stop reason: ALTCHOICE

## 2019-01-28 ENCOUNTER — OFFICE VISIT (OUTPATIENT)
Dept: FAMILY MEDICINE CLINIC | Age: 59
End: 2019-01-28
Payer: MEDICARE

## 2019-01-28 VITALS
HEIGHT: 67 IN | TEMPERATURE: 97.6 F | SYSTOLIC BLOOD PRESSURE: 138 MMHG | HEART RATE: 92 BPM | DIASTOLIC BLOOD PRESSURE: 80 MMHG | BODY MASS INDEX: 23.39 KG/M2 | WEIGHT: 149 LBS

## 2019-01-28 DIAGNOSIS — I10 ESSENTIAL HYPERTENSION: ICD-10-CM

## 2019-01-28 DIAGNOSIS — E11.9 TYPE 2 DIABETES MELLITUS WITHOUT COMPLICATION, WITHOUT LONG-TERM CURRENT USE OF INSULIN (HCC): ICD-10-CM

## 2019-01-28 DIAGNOSIS — E11.9 TYPE 2 DIABETES MELLITUS WITHOUT COMPLICATION, WITHOUT LONG-TERM CURRENT USE OF INSULIN (HCC): Primary | ICD-10-CM

## 2019-01-28 LAB
ANION GAP SERPL CALCULATED.3IONS-SCNC: 17 MMOL/L (ref 3–16)
BUN BLDV-MCNC: 11 MG/DL (ref 7–20)
CALCIUM SERPL-MCNC: 10.3 MG/DL (ref 8.3–10.6)
CHLORIDE BLD-SCNC: 102 MMOL/L (ref 99–110)
CO2: 22 MMOL/L (ref 21–32)
CREAT SERPL-MCNC: 0.6 MG/DL (ref 0.6–1.2)
GFR AFRICAN AMERICAN: >60
GFR NON-AFRICAN AMERICAN: >60
GLUCOSE BLD-MCNC: 66 MG/DL (ref 70–99)
POTASSIUM SERPL-SCNC: 4.3 MMOL/L (ref 3.5–5.1)
SODIUM BLD-SCNC: 141 MMOL/L (ref 136–145)

## 2019-01-28 PROCEDURE — 3017F COLORECTAL CA SCREEN DOC REV: CPT | Performed by: FAMILY MEDICINE

## 2019-01-28 PROCEDURE — 99213 OFFICE O/P EST LOW 20 MIN: CPT | Performed by: FAMILY MEDICINE

## 2019-01-28 PROCEDURE — 4004F PT TOBACCO SCREEN RCVD TLK: CPT | Performed by: FAMILY MEDICINE

## 2019-01-28 PROCEDURE — 3046F HEMOGLOBIN A1C LEVEL >9.0%: CPT | Performed by: FAMILY MEDICINE

## 2019-01-28 PROCEDURE — 2022F DILAT RTA XM EVC RTNOPTHY: CPT | Performed by: FAMILY MEDICINE

## 2019-01-28 PROCEDURE — G8427 DOCREV CUR MEDS BY ELIG CLIN: HCPCS | Performed by: FAMILY MEDICINE

## 2019-01-28 PROCEDURE — G8482 FLU IMMUNIZE ORDER/ADMIN: HCPCS | Performed by: FAMILY MEDICINE

## 2019-01-28 PROCEDURE — G8420 CALC BMI NORM PARAMETERS: HCPCS | Performed by: FAMILY MEDICINE

## 2019-01-28 RX ORDER — OXYCODONE HYDROCHLORIDE 5 MG/1
TABLET ORAL
Refills: 0 | COMMUNITY
Start: 2019-01-23 | End: 2019-03-06 | Stop reason: ALTCHOICE

## 2019-01-29 LAB
ESTIMATED AVERAGE GLUCOSE: 119.8 MG/DL
HBA1C MFR BLD: 5.8 %

## 2019-01-29 RX ORDER — METFORMIN HYDROCHLORIDE 500 MG/1
500 TABLET, EXTENDED RELEASE ORAL 2 TIMES DAILY WITH MEALS
Qty: 360 TABLET | Refills: 1 | Status: SHIPPED
Start: 2019-01-29 | End: 2019-03-06 | Stop reason: ALTCHOICE

## 2019-02-12 ENCOUNTER — TELEPHONE (OUTPATIENT)
Dept: FAMILY MEDICINE CLINIC | Age: 59
End: 2019-02-12

## 2019-03-06 ENCOUNTER — HOSPITAL ENCOUNTER (OUTPATIENT)
Dept: ULTRASOUND IMAGING | Age: 59
Discharge: HOME | End: 2019-03-06
Payer: MEDICARE

## 2019-03-06 ENCOUNTER — OFFICE VISIT (OUTPATIENT)
Dept: BREAST CENTER | Age: 59
End: 2019-03-06
Payer: MEDICARE

## 2019-03-06 VITALS
HEART RATE: 97 BPM | WEIGHT: 149 LBS | DIASTOLIC BLOOD PRESSURE: 84 MMHG | BODY MASS INDEX: 23.69 KG/M2 | SYSTOLIC BLOOD PRESSURE: 141 MMHG

## 2019-03-06 DIAGNOSIS — R92.8 ABNORMAL MAMMOGRAM OF LEFT BREAST: ICD-10-CM

## 2019-03-06 DIAGNOSIS — R92.8 OTHER ABNORMAL AND INCONCLUSIVE FINDINGS ON DIAGNOSTIC IMAGING OF BREAST: ICD-10-CM

## 2019-03-06 DIAGNOSIS — Z80.3 FAMILY HISTORY OF BREAST CANCER IN MOTHER: ICD-10-CM

## 2019-03-06 DIAGNOSIS — R92.8 ABNORMAL MAMMOGRAM OF LEFT BREAST: Primary | ICD-10-CM

## 2019-03-06 PROCEDURE — 3017F COLORECTAL CA SCREEN DOC REV: CPT | Performed by: SURGERY

## 2019-03-06 PROCEDURE — 99213 OFFICE O/P EST LOW 20 MIN: CPT | Performed by: SURGERY

## 2019-03-06 PROCEDURE — G8427 DOCREV CUR MEDS BY ELIG CLIN: HCPCS | Performed by: SURGERY

## 2019-03-06 PROCEDURE — 76642 ULTRASOUND BREAST LIMITED: CPT

## 2019-03-06 PROCEDURE — G8482 FLU IMMUNIZE ORDER/ADMIN: HCPCS | Performed by: SURGERY

## 2019-03-06 PROCEDURE — G8420 CALC BMI NORM PARAMETERS: HCPCS | Performed by: SURGERY

## 2019-03-06 PROCEDURE — 4004F PT TOBACCO SCREEN RCVD TLK: CPT | Performed by: SURGERY

## 2019-03-20 ENCOUNTER — HOSPITAL ENCOUNTER (OUTPATIENT)
Age: 59
Discharge: HOME | End: 2019-03-20
Payer: MEDICARE

## 2019-03-20 LAB
ALBUMIN SERPL-MCNC: 4.2 G/DL (ref 3.4–5)
ALP BLD-CCNC: 212 U/L (ref 40–129)
ALT SERPL-CCNC: 103 U/L (ref 10–40)
ANION GAP SERPL CALCULATED.3IONS-SCNC: 14 MMOL/L (ref 3–16)
AST SERPL-CCNC: 53 U/L (ref 15–37)
BASOPHILS ABSOLUTE: 0 K/UL (ref 0–0.2)
BASOPHILS RELATIVE PERCENT: 0.4 %
BILIRUB SERPL-MCNC: 0.3 MG/DL (ref 0–1)
BILIRUBIN DIRECT: <0.2 MG/DL (ref 0–0.3)
BILIRUBIN, INDIRECT: ABNORMAL MG/DL (ref 0–1)
BUN BLDV-MCNC: 9 MG/DL (ref 7–20)
CALCIUM SERPL-MCNC: 10.4 MG/DL (ref 8.3–10.6)
CHLORIDE BLD-SCNC: 104 MMOL/L (ref 99–110)
CO2: 27 MMOL/L (ref 21–32)
CREAT SERPL-MCNC: 0.6 MG/DL (ref 0.6–1.2)
EOSINOPHILS ABSOLUTE: 0.1 K/UL (ref 0–0.6)
EOSINOPHILS RELATIVE PERCENT: 1.4 %
GFR AFRICAN AMERICAN: >60
GFR NON-AFRICAN AMERICAN: >60
GLUCOSE BLD-MCNC: 133 MG/DL (ref 70–99)
HCT VFR BLD CALC: 44.2 % (ref 36–48)
HEMOGLOBIN: 14.5 G/DL (ref 12–16)
INR BLD: 1.03 (ref 0.86–1.14)
LYMPHOCYTES ABSOLUTE: 3.1 K/UL (ref 1–5.1)
LYMPHOCYTES RELATIVE PERCENT: 45.9 %
MCH RBC QN AUTO: 30.9 PG (ref 26–34)
MCHC RBC AUTO-ENTMCNC: 32.8 G/DL (ref 31–36)
MCV RBC AUTO: 94.1 FL (ref 80–100)
MONOCYTES ABSOLUTE: 0.4 K/UL (ref 0–1.3)
MONOCYTES RELATIVE PERCENT: 5.8 %
NEUTROPHILS ABSOLUTE: 3.1 K/UL (ref 1.7–7.7)
NEUTROPHILS RELATIVE PERCENT: 46.5 %
PDW BLD-RTO: 15.7 % (ref 12.4–15.4)
PLATELET # BLD: 221 K/UL (ref 135–450)
PMV BLD AUTO: 7.7 FL (ref 5–10.5)
POTASSIUM SERPL-SCNC: 4.9 MMOL/L (ref 3.5–5.1)
PROTHROMBIN TIME: 11.7 SEC (ref 9.8–13)
RBC # BLD: 4.69 M/UL (ref 4–5.2)
SODIUM BLD-SCNC: 145 MMOL/L (ref 136–145)
TOTAL PROTEIN: 7.8 G/DL (ref 6.4–8.2)
WBC # BLD: 6.8 K/UL (ref 4–11)

## 2019-03-20 PROCEDURE — 80048 BASIC METABOLIC PNL TOTAL CA: CPT

## 2019-03-20 PROCEDURE — 85610 PROTHROMBIN TIME: CPT

## 2019-03-20 PROCEDURE — 36415 COLL VENOUS BLD VENIPUNCTURE: CPT

## 2019-03-20 PROCEDURE — 85025 COMPLETE CBC W/AUTO DIFF WBC: CPT

## 2019-03-20 PROCEDURE — 80076 HEPATIC FUNCTION PANEL: CPT

## 2019-04-18 RX ORDER — BLOOD SUGAR DIAGNOSTIC
STRIP MISCELLANEOUS
Qty: 100 STRIP | Refills: 3 | Status: SHIPPED | OUTPATIENT
Start: 2019-04-18 | End: 2021-06-22 | Stop reason: SDUPTHER

## 2019-06-02 RX ORDER — PRAVASTATIN SODIUM 40 MG
TABLET ORAL
Qty: 90 TABLET | Refills: 0 | Status: SHIPPED | OUTPATIENT
Start: 2019-06-02 | End: 2019-07-05 | Stop reason: SDUPTHER

## 2019-06-03 NOTE — TELEPHONE ENCOUNTER
Patient advised and expressed understanding. Patient states that she has an appointment scheduled on 6/4/2019.

## 2019-06-04 ENCOUNTER — OFFICE VISIT (OUTPATIENT)
Dept: FAMILY MEDICINE CLINIC | Age: 59
End: 2019-06-04
Payer: MEDICARE

## 2019-06-04 VITALS
DIASTOLIC BLOOD PRESSURE: 82 MMHG | TEMPERATURE: 97.9 F | BODY MASS INDEX: 23.54 KG/M2 | HEIGHT: 67 IN | WEIGHT: 150 LBS | SYSTOLIC BLOOD PRESSURE: 128 MMHG | HEART RATE: 84 BPM

## 2019-06-04 DIAGNOSIS — Z72.0 TOBACCO ABUSE: ICD-10-CM

## 2019-06-04 DIAGNOSIS — J45.901 ASTHMA WITH ACUTE EXACERBATION, UNSPECIFIED ASTHMA SEVERITY, UNSPECIFIED WHETHER PERSISTENT: ICD-10-CM

## 2019-06-04 DIAGNOSIS — Z01.818 PREOP EXAMINATION: Primary | ICD-10-CM

## 2019-06-04 DIAGNOSIS — G47.33 OBSTRUCTIVE SLEEP APNEA SYNDROME: ICD-10-CM

## 2019-06-04 DIAGNOSIS — E11.9 TYPE 2 DIABETES MELLITUS WITHOUT COMPLICATION, WITHOUT LONG-TERM CURRENT USE OF INSULIN (HCC): ICD-10-CM

## 2019-06-04 DIAGNOSIS — M53.3 CHRONIC SI JOINT PAIN: ICD-10-CM

## 2019-06-04 DIAGNOSIS — G89.29 CHRONIC SI JOINT PAIN: ICD-10-CM

## 2019-06-04 PROCEDURE — 3044F HG A1C LEVEL LT 7.0%: CPT | Performed by: FAMILY MEDICINE

## 2019-06-04 PROCEDURE — 99214 OFFICE O/P EST MOD 30 MIN: CPT | Performed by: FAMILY MEDICINE

## 2019-06-04 PROCEDURE — 3017F COLORECTAL CA SCREEN DOC REV: CPT | Performed by: FAMILY MEDICINE

## 2019-06-04 PROCEDURE — 2022F DILAT RTA XM EVC RTNOPTHY: CPT | Performed by: FAMILY MEDICINE

## 2019-06-04 PROCEDURE — G8427 DOCREV CUR MEDS BY ELIG CLIN: HCPCS | Performed by: FAMILY MEDICINE

## 2019-06-04 PROCEDURE — 4004F PT TOBACCO SCREEN RCVD TLK: CPT | Performed by: FAMILY MEDICINE

## 2019-06-04 PROCEDURE — G8420 CALC BMI NORM PARAMETERS: HCPCS | Performed by: FAMILY MEDICINE

## 2019-06-04 ASSESSMENT — ENCOUNTER SYMPTOMS
CONSTIPATION: 0
ABDOMINAL PAIN: 0
VOMITING: 0
CHEST TIGHTNESS: 0
VOICE CHANGE: 0
TROUBLE SWALLOWING: 0
BLOOD IN STOOL: 0
NAUSEA: 0
SHORTNESS OF BREATH: 0
COUGH: 0
DIARRHEA: 0
ABDOMINAL DISTENTION: 0
EYE PAIN: 0
SORE THROAT: 0

## 2019-06-04 NOTE — PROGRESS NOTES
constipation, diarrhea, nausea and vomiting. No gerd no dysphagia   Genitourinary: Negative for difficulty urinating, dysuria, frequency, hematuria and vaginal bleeding. Skin: Negative for rash. Neurological: Negative for dizziness, seizures, syncope and headaches. No hx of CVA   Hematological: Negative for adenopathy. Does not bruise/bleed easily. No hx of blood clot       Objective:   Physical Exam   Constitutional: She appears well-developed and well-nourished. No distress. HENT:   Head: Normocephalic and atraumatic. Right Ear: Tympanic membrane and ear canal normal.   Left Ear: Tympanic membrane and ear canal normal.   Nose: Nose normal.   Mouth/Throat: Uvula is midline, oropharynx is clear and moist and mucous membranes are normal. No oral lesions. Eyes: Pupils are equal, round, and reactive to light. No scleral icterus. Neck: Full passive range of motion without pain. Neck supple. No thyroid mass and no thyromegaly present. Cardiovascular: Normal rate, regular rhythm and normal heart sounds. Exam reveals no gallop and no friction rub. No murmur heard. No edema   Pulmonary/Chest: Effort normal and breath sounds normal. No accessory muscle usage. No tachypnea. No respiratory distress. She has no decreased breath sounds. She has no wheezes. She has no rhonchi. She has no rales. Abdominal: Soft. Normal appearance and bowel sounds are normal. She exhibits no distension, no abdominal bruit, no pulsatile midline mass and no mass. There is no hepatosplenomegaly. There is no tenderness. There is no rigidity and no guarding. Lymphadenopathy:        Head (right side): No submental, no submandibular, no preauricular and no posterior auricular adenopathy present. Head (left side): No submental, no submandibular, no preauricular and no posterior auricular adenopathy present. She has no cervical adenopathy. Right: No supraclavicular adenopathy present. Left: No supraclavicular adenopathy present. Neurological: She is alert. She displays no tremor. Skin: Skin is warm, dry and intact. She is not diaphoretic. No cyanosis. No pallor. Nails show no clubbing. Psychiatric: She has a normal mood and affect. Her speech is normal.       Assessment:        Diagnosis Orders   1. Preop examination     2. Chronic SI joint pain     3. Type 2 diabetes mellitus without complication, without long-term current use of insulin ([facility])  Lipid Panel   4. Obstructive sleep apnea syndrome     5. Asthma with acute exacerbation, unspecified asthma severity, unspecified whether persistent     6. Tobacco abuse         Blood work yesterday on 6/3/19 HgA1c was 6.1; CBC normal; BMP normal; nasal staph was negative. EKG read with PVC but not visible to us.       Plan:      Take the CPAP machine to the hospital in case needed  No medicines on day of the surgery  Se me back in 4 months        Ruben Rojo MD

## 2019-07-05 RX ORDER — PRAVASTATIN SODIUM 40 MG
TABLET ORAL
Qty: 90 TABLET | Refills: 0 | Status: SHIPPED | OUTPATIENT
Start: 2019-07-05 | End: 2019-08-31 | Stop reason: SDUPTHER

## 2019-07-08 ENCOUNTER — TELEPHONE (OUTPATIENT)
Dept: FAMILY MEDICINE CLINIC | Age: 59
End: 2019-07-08

## 2019-07-08 RX ORDER — ALBUTEROL SULFATE 90 UG/1
2 AEROSOL, METERED RESPIRATORY (INHALATION) EVERY 6 HOURS PRN
Qty: 3 INHALER | Refills: 0 | Status: SHIPPED | OUTPATIENT
Start: 2019-07-08 | End: 2021-11-30 | Stop reason: SDUPTHER

## 2019-09-03 RX ORDER — LOSARTAN POTASSIUM 50 MG/1
TABLET ORAL
Qty: 90 TABLET | Refills: 3 | Status: SHIPPED | OUTPATIENT
Start: 2019-09-03 | End: 2020-10-08 | Stop reason: ALTCHOICE

## 2019-09-30 RX ORDER — CYCLOBENZAPRINE HCL 10 MG
10 TABLET ORAL 2 TIMES DAILY PRN
OUTPATIENT
Start: 2019-09-30

## 2019-10-04 ENCOUNTER — OFFICE VISIT (OUTPATIENT)
Dept: FAMILY MEDICINE CLINIC | Age: 59
End: 2019-10-04
Payer: MEDICARE

## 2019-10-04 VITALS
HEIGHT: 67 IN | WEIGHT: 152 LBS | SYSTOLIC BLOOD PRESSURE: 130 MMHG | TEMPERATURE: 98 F | HEART RATE: 68 BPM | DIASTOLIC BLOOD PRESSURE: 80 MMHG | BODY MASS INDEX: 23.86 KG/M2

## 2019-10-04 DIAGNOSIS — I10 ESSENTIAL HYPERTENSION: ICD-10-CM

## 2019-10-04 DIAGNOSIS — E78.2 MIXED HYPERLIPIDEMIA: ICD-10-CM

## 2019-10-04 DIAGNOSIS — E11.9 TYPE 2 DIABETES MELLITUS WITHOUT COMPLICATION, WITHOUT LONG-TERM CURRENT USE OF INSULIN (HCC): ICD-10-CM

## 2019-10-04 DIAGNOSIS — I10 ESSENTIAL HYPERTENSION: Primary | ICD-10-CM

## 2019-10-04 DIAGNOSIS — Z23 NEEDS FLU SHOT: ICD-10-CM

## 2019-10-04 DIAGNOSIS — M06.9 RHEUMATOID ARTHRITIS, INVOLVING UNSPECIFIED SITE, UNSPECIFIED RHEUMATOID FACTOR PRESENCE: ICD-10-CM

## 2019-10-04 LAB
A/G RATIO: 1.5 (ref 1.1–2.2)
ALBUMIN SERPL-MCNC: 4.8 G/DL (ref 3.4–5)
ALP BLD-CCNC: 142 U/L (ref 40–129)
ALT SERPL-CCNC: 24 U/L (ref 10–40)
ANION GAP SERPL CALCULATED.3IONS-SCNC: 18 MMOL/L (ref 3–16)
AST SERPL-CCNC: 34 U/L (ref 15–37)
BILIRUB SERPL-MCNC: 0.4 MG/DL (ref 0–1)
BUN BLDV-MCNC: 12 MG/DL (ref 7–20)
CALCIUM SERPL-MCNC: 10.5 MG/DL (ref 8.3–10.6)
CHLORIDE BLD-SCNC: 101 MMOL/L (ref 99–110)
CHOLESTEROL, TOTAL: 230 MG/DL (ref 0–199)
CO2: 22 MMOL/L (ref 21–32)
CREAT SERPL-MCNC: 0.8 MG/DL (ref 0.6–1.2)
GFR AFRICAN AMERICAN: >60
GFR NON-AFRICAN AMERICAN: >60
GLOBULIN: 3.2 G/DL
GLUCOSE BLD-MCNC: 87 MG/DL (ref 70–99)
HDLC SERPL-MCNC: 75 MG/DL (ref 40–60)
LDL CHOLESTEROL CALCULATED: 116 MG/DL
POTASSIUM SERPL-SCNC: 4.3 MMOL/L (ref 3.5–5.1)
SODIUM BLD-SCNC: 141 MMOL/L (ref 136–145)
TOTAL PROTEIN: 8 G/DL (ref 6.4–8.2)
TRIGL SERPL-MCNC: 196 MG/DL (ref 0–150)
VLDLC SERPL CALC-MCNC: 39 MG/DL

## 2019-10-04 PROCEDURE — 3017F COLORECTAL CA SCREEN DOC REV: CPT | Performed by: FAMILY MEDICINE

## 2019-10-04 PROCEDURE — G8427 DOCREV CUR MEDS BY ELIG CLIN: HCPCS | Performed by: FAMILY MEDICINE

## 2019-10-04 PROCEDURE — 4004F PT TOBACCO SCREEN RCVD TLK: CPT | Performed by: FAMILY MEDICINE

## 2019-10-04 PROCEDURE — G8420 CALC BMI NORM PARAMETERS: HCPCS | Performed by: FAMILY MEDICINE

## 2019-10-04 PROCEDURE — 99214 OFFICE O/P EST MOD 30 MIN: CPT | Performed by: FAMILY MEDICINE

## 2019-10-04 PROCEDURE — 2022F DILAT RTA XM EVC RTNOPTHY: CPT | Performed by: FAMILY MEDICINE

## 2019-10-04 PROCEDURE — G8482 FLU IMMUNIZE ORDER/ADMIN: HCPCS | Performed by: FAMILY MEDICINE

## 2019-10-04 PROCEDURE — 3044F HG A1C LEVEL LT 7.0%: CPT | Performed by: FAMILY MEDICINE

## 2019-10-04 PROCEDURE — G0008 ADMIN INFLUENZA VIRUS VAC: HCPCS | Performed by: FAMILY MEDICINE

## 2019-10-04 PROCEDURE — 90686 IIV4 VACC NO PRSV 0.5 ML IM: CPT | Performed by: FAMILY MEDICINE

## 2019-10-04 RX ORDER — SULINDAC 200 MG/1
200 TABLET ORAL 2 TIMES DAILY
Qty: 180 TABLET | Refills: 1 | Status: SHIPPED | OUTPATIENT
Start: 2019-10-04 | End: 2020-03-05

## 2019-10-04 RX ORDER — ATORVASTATIN CALCIUM 20 MG/1
20 TABLET, FILM COATED ORAL DAILY
Qty: 90 TABLET | Refills: 1 | Status: SHIPPED | OUTPATIENT
Start: 2019-10-04 | End: 2020-03-05

## 2019-10-04 RX ORDER — CYCLOBENZAPRINE HCL 10 MG
10 TABLET ORAL 2 TIMES DAILY PRN
Qty: 180 TABLET | Refills: 0 | Status: SHIPPED | OUTPATIENT
Start: 2019-10-04 | End: 2019-12-10 | Stop reason: SDUPTHER

## 2019-10-04 ASSESSMENT — ENCOUNTER SYMPTOMS
CONSTIPATION: 0
DIARRHEA: 0
NAUSEA: 0
BLOOD IN STOOL: 0
ABDOMINAL DISTENTION: 0
ABDOMINAL PAIN: 0
SHORTNESS OF BREATH: 0
VOMITING: 0
CHEST TIGHTNESS: 0

## 2019-10-05 LAB
ESTIMATED AVERAGE GLUCOSE: 128.4 MG/DL
HBA1C MFR BLD: 6.1 %

## 2019-12-10 RX ORDER — CYCLOBENZAPRINE HCL 10 MG
10 TABLET ORAL 2 TIMES DAILY PRN
Qty: 180 TABLET | Refills: 0 | Status: SHIPPED | OUTPATIENT
Start: 2019-12-10 | End: 2020-03-05

## 2019-12-19 ENCOUNTER — OFFICE VISIT (OUTPATIENT)
Dept: FAMILY MEDICINE CLINIC | Age: 59
End: 2019-12-19
Payer: MEDICARE

## 2019-12-19 VITALS
WEIGHT: 162 LBS | SYSTOLIC BLOOD PRESSURE: 128 MMHG | BODY MASS INDEX: 25.43 KG/M2 | HEIGHT: 67 IN | DIASTOLIC BLOOD PRESSURE: 86 MMHG | TEMPERATURE: 97.4 F

## 2019-12-19 DIAGNOSIS — J06.9 ACUTE UPPER RESPIRATORY INFECTION, UNSPECIFIED: Primary | ICD-10-CM

## 2019-12-19 DIAGNOSIS — M54.42 LEFT-SIDED LOW BACK PAIN WITH LEFT-SIDED SCIATICA, UNSPECIFIED CHRONICITY: ICD-10-CM

## 2019-12-19 PROCEDURE — 4004F PT TOBACCO SCREEN RCVD TLK: CPT | Performed by: FAMILY MEDICINE

## 2019-12-19 PROCEDURE — G8419 CALC BMI OUT NRM PARAM NOF/U: HCPCS | Performed by: FAMILY MEDICINE

## 2019-12-19 PROCEDURE — 99213 OFFICE O/P EST LOW 20 MIN: CPT | Performed by: FAMILY MEDICINE

## 2019-12-19 PROCEDURE — G8427 DOCREV CUR MEDS BY ELIG CLIN: HCPCS | Performed by: FAMILY MEDICINE

## 2019-12-19 PROCEDURE — 3017F COLORECTAL CA SCREEN DOC REV: CPT | Performed by: FAMILY MEDICINE

## 2019-12-19 PROCEDURE — G8482 FLU IMMUNIZE ORDER/ADMIN: HCPCS | Performed by: FAMILY MEDICINE

## 2019-12-19 RX ORDER — METHYLPREDNISOLONE 4 MG/1
TABLET ORAL
Qty: 1 KIT | Refills: 0 | Status: SHIPPED | OUTPATIENT
Start: 2019-12-19 | End: 2019-12-25

## 2020-03-05 RX ORDER — CYCLOBENZAPRINE HCL 10 MG
TABLET ORAL
Qty: 180 TABLET | Refills: 0 | Status: SHIPPED | OUTPATIENT
Start: 2020-03-05 | End: 2020-05-21

## 2020-03-05 RX ORDER — ATORVASTATIN CALCIUM 20 MG/1
TABLET, FILM COATED ORAL
Qty: 90 TABLET | Refills: 1 | Status: SHIPPED | OUTPATIENT
Start: 2020-03-05 | End: 2020-07-10 | Stop reason: SDUPTHER

## 2020-03-05 RX ORDER — SULINDAC 200 MG/1
TABLET ORAL
Qty: 180 TABLET | Refills: 1 | Status: SHIPPED | OUTPATIENT
Start: 2020-03-05 | End: 2020-07-10 | Stop reason: SDUPTHER

## 2020-05-11 ENCOUNTER — TELEPHONE (OUTPATIENT)
Dept: FAMILY MEDICINE CLINIC | Age: 60
End: 2020-05-11

## 2020-05-21 RX ORDER — CYCLOBENZAPRINE HCL 10 MG
TABLET ORAL
Qty: 180 TABLET | Refills: 0 | Status: SHIPPED | OUTPATIENT
Start: 2020-05-21 | End: 2020-08-06

## 2020-06-25 ENCOUNTER — OFFICE VISIT (OUTPATIENT)
Dept: PRIMARY CARE CLINIC | Age: 60
End: 2020-06-25
Payer: MEDICARE

## 2020-06-25 VITALS — TEMPERATURE: 97.6 F | HEART RATE: 107 BPM | OXYGEN SATURATION: 96 %

## 2020-06-25 PROCEDURE — 4004F PT TOBACCO SCREEN RCVD TLK: CPT | Performed by: NURSE PRACTITIONER

## 2020-06-25 PROCEDURE — G8428 CUR MEDS NOT DOCUMENT: HCPCS | Performed by: NURSE PRACTITIONER

## 2020-06-25 PROCEDURE — 99213 OFFICE O/P EST LOW 20 MIN: CPT | Performed by: NURSE PRACTITIONER

## 2020-06-25 PROCEDURE — 3017F COLORECTAL CA SCREEN DOC REV: CPT | Performed by: NURSE PRACTITIONER

## 2020-06-25 PROCEDURE — G8419 CALC BMI OUT NRM PARAM NOF/U: HCPCS | Performed by: NURSE PRACTITIONER

## 2020-06-25 NOTE — PROGRESS NOTES
FLU/COVID-19 CLINIC  2020  Patient ID:  Natali Tobin is a 62 y.o. female  : 1960    HPI/SYMPTOMS: Patient complaining of shortness of breath, chills, cough for 2 weeks now. Symptom duration, days:  [] 1   [] 2   [] 3   [] 4 - 7  [] 8 - 10   [] 11 - 13   [x] >14    IF THE BELOW SYMPTOMS ARE NOT CHECKED, THEN THEY ARE NEGATIVE:  [] Fevers  {  [] Symptom (not measured)  [] Measured (Result:  degrees)  [x] Chills  [x] Cough   [x] Dry   [] Productive  [] Coughing up blood    [x] Short of breath  [] Rest  [] Exertion only   [] Chest pain     [] Chest tightness  [] Chest congestion  [] Nasal congestion  [] Sneezing  [] Loss of smell or taste  [] Sore throat  [] Headaches  [] Tolerable  [] Severe     [] Muscle aches  [] Nausea  [] Vomiting  []Unable to keep fluids down     [] Diarrhea  []Severe     [] OTHER SYMPTOMS:      Symptom course:   [] Worsening     [x] Stable     [] Improving    RISK FACTORS (if not checked they are negative) :  [] Pregnant or possibly pregnant  [x] Age over 61  [x] Asthma  [] COPD/Other chronic lung diseases  [x] Diabetes  [] Heart disease  [] Active Cancer  [] On Chemotherapy  [] History Lymphoma/Leukemia  [] Obesity  []Tobacco use, current  []Other:      [] Close contact with a lab confirmed COVID-19 patient within 14 days of symptom onset    ALLERGIES  Allergies   Allergen Reactions    Azithromycin Itching, Rash, Other (See Comments) and Swelling     Other reaction(s):  Other (See Comments)  FEVER  FEVER    Baclofen Nausea And Vomiting, Itching and Rash    Nuts [Peanut Oil] Anaphylaxis    Peanut-Containing Drug Products Anaphylaxis    Sulfa Antibiotics Anaphylaxis and Rash    Adalimumab Other (See Comments)     \"immune system crashes\"    Avelox [Moxifloxacin Hcl In Nacl]     Bactrim     Infliximab Other (See Comments)     \"immune system crashes\"    Lisinopril Other (See Comments)     Cough    Zithromax [Azithromycin Dihydrate]     Adhesive Tape Rash     PAPER

## 2020-06-25 NOTE — PATIENT INSTRUCTIONS
Advance Care Planning  People with COVID-19 may have no symptoms, mild symptoms, such as fever, cough, and shortness of breath or they may have more severe illness, developing severe and fatal pneumonia. As a result, Advance Care Planning with attention to naming a health care decision maker (someone you trust to make healthcare decisions for you if you could not speak for yourself) and sharing other health care preferences is important BEFORE a possible health crisis. Please contact your Primary Care Provider to discuss Advance Care Planning. Preventing the Spread of Coronavirus Disease 2019 in Homes and Residential Communities  For the most recent information go to [email or web address]    Prevention steps for People with confirmed or suspected COVID-19 (including persons under investigation) who do not need to be hospitalized  and   People with confirmed COVID-19 who were hospitalized and determined to be medically stable to go home    Your healthcare provider and public health staff will evaluate whether you can be cared for at home. If it is determined that you do not need to be hospitalized and can be isolated at home, you will be monitored by staff from your local or state health department. You should follow the prevention steps below until a healthcare provider or local or state health department says you can return to your normal activities. Stay home except to get medical care  People who are mildly ill with COVID-19 are able to isolate at home during their illness. You should restrict activities outside your home, except for getting medical care. Do not go to work, school, or public areas. Avoid using public transportation, ride-sharing, or taxis. Separate yourself from other people and animals in your home  People: As much as possible, you should stay in a specific room and away from other people in your home.  Also, you should use a separate bathroom, if available. Animals: You should restrict contact with pets and other animals while you are sick with COVID-19, just like you would around other people. Although there have not been reports of pets or other animals becoming sick with COVID-19, it is still recommended that people sick with COVID-19 limit contact with animals until more information is known about the virus. When possible, have another member of your household care for your animals while you are sick. If you are sick with COVID-19, avoid contact with your pet, including petting, snuggling, being kissed or licked, and sharing food. If you must care for your pet or be around animals while you are sick, wash your hands before and after you interact with pets and wear a facemask. Call ahead before visiting your doctor  If you have a medical appointment, call the healthcare provider and tell them that you have or may have COVID-19. This will help the healthcare providers office take steps to keep other people from getting infected or exposed. Wear a facemask  You should wear a facemask when you are around other people (e.g., sharing a room or vehicle) or pets and before you enter a healthcare providers office. If you are not able to wear a facemask (for example, because it causes trouble breathing), then people who live with you should not stay in the same room with you, or they should wear a facemask if they enter your room. Cover your coughs and sneezes  Cover your mouth and nose with a tissue when you cough or sneeze. Throw used tissues in a lined trash can. Immediately wash your hands with soap and water for at least 20 seconds or, if soap and water are not available, clean your hands with an alcohol-based hand  that contains at least 60% alcohol.   Clean your hands often  Wash your hands often with soap and water for at least 20 seconds, especially after blowing your nose, coughing, or sneezing; going to the bathroom; and

## 2020-06-27 ENCOUNTER — CARE COORDINATION (OUTPATIENT)
Dept: CASE MANAGEMENT | Age: 60
End: 2020-06-27

## 2020-06-27 NOTE — CARE COORDINATION
Date/Time:  6/27/2020 12:31 PM  LPN attempted to reach patient by telephone regarding yellow alert in Loop remote symptom monitoring program. Left HIPPA compliant message requesting a return call. Will attempt to reach patient again. Comment left in Loop monitoring program with contact information. Estela Lora LPN, 05:91 PM  Hi, I'm Marilin Coffey LPN from the [address] [place], [place] [address] [facility] Care Team. I see you triggered an alert earlier. I called and left you a message to call back to discuss your symptoms. If you are still having problems, either call me at [phone] or you can send an E-mail. I am available between 8 am to 4 pm Mon-Fri. and 9 am to 1 pm weekends. If your symptoms are severe - Please call your doctor, call 911 and/or go to the nearest Emergency Room. Patient did not respond to Loop E-mail or Telephone Call.      Estela Lora, DELON     [address] [facility] / THE [facility]

## 2020-06-28 LAB
SARS-COV-2: NOT DETECTED
SOURCE: NORMAL

## 2020-07-10 RX ORDER — SULINDAC 200 MG/1
TABLET ORAL
Qty: 180 TABLET | Refills: 0 | Status: SHIPPED | OUTPATIENT
Start: 2020-07-10 | End: 2020-12-11

## 2020-07-10 RX ORDER — METFORMIN HYDROCHLORIDE 500 MG/1
500 TABLET, EXTENDED RELEASE ORAL
Qty: 360 TABLET | Refills: 0 | Status: SHIPPED | OUTPATIENT
Start: 2020-07-10 | End: 2020-09-28

## 2020-07-10 RX ORDER — MONTELUKAST SODIUM 10 MG/1
TABLET ORAL
Qty: 90 TABLET | Refills: 0 | Status: SHIPPED | OUTPATIENT
Start: 2020-07-10 | End: 2020-09-25

## 2020-07-10 RX ORDER — ATORVASTATIN CALCIUM 20 MG/1
TABLET, FILM COATED ORAL
Qty: 90 TABLET | Refills: 0 | Status: SHIPPED | OUTPATIENT
Start: 2020-07-10 | End: 2020-09-28

## 2020-07-21 ENCOUNTER — OFFICE VISIT (OUTPATIENT)
Dept: FAMILY MEDICINE CLINIC | Age: 60
End: 2020-07-21
Payer: MEDICARE

## 2020-07-21 VITALS
TEMPERATURE: 98.1 F | SYSTOLIC BLOOD PRESSURE: 138 MMHG | WEIGHT: 165 LBS | BODY MASS INDEX: 25.9 KG/M2 | DIASTOLIC BLOOD PRESSURE: 86 MMHG | HEART RATE: 120 BPM | HEIGHT: 67 IN

## 2020-07-21 DIAGNOSIS — E11.9 TYPE 2 DIABETES MELLITUS WITHOUT COMPLICATION, WITHOUT LONG-TERM CURRENT USE OF INSULIN (HCC): ICD-10-CM

## 2020-07-21 DIAGNOSIS — I10 ESSENTIAL HYPERTENSION: ICD-10-CM

## 2020-07-21 PROCEDURE — G8427 DOCREV CUR MEDS BY ELIG CLIN: HCPCS | Performed by: FAMILY MEDICINE

## 2020-07-21 PROCEDURE — 99213 OFFICE O/P EST LOW 20 MIN: CPT | Performed by: FAMILY MEDICINE

## 2020-07-21 PROCEDURE — G8419 CALC BMI OUT NRM PARAM NOF/U: HCPCS | Performed by: FAMILY MEDICINE

## 2020-07-21 PROCEDURE — 3017F COLORECTAL CA SCREEN DOC REV: CPT | Performed by: FAMILY MEDICINE

## 2020-07-21 PROCEDURE — 4004F PT TOBACCO SCREEN RCVD TLK: CPT | Performed by: FAMILY MEDICINE

## 2020-07-21 PROCEDURE — 3046F HEMOGLOBIN A1C LEVEL >9.0%: CPT | Performed by: FAMILY MEDICINE

## 2020-07-21 PROCEDURE — 2022F DILAT RTA XM EVC RTNOPTHY: CPT | Performed by: FAMILY MEDICINE

## 2020-07-21 ASSESSMENT — PATIENT HEALTH QUESTIONNAIRE - PHQ9
SUM OF ALL RESPONSES TO PHQ9 QUESTIONS 1 & 2: 0
SUM OF ALL RESPONSES TO PHQ QUESTIONS 1-9: 0
2. FEELING DOWN, DEPRESSED OR HOPELESS: 0
1. LITTLE INTEREST OR PLEASURE IN DOING THINGS: 0
SUM OF ALL RESPONSES TO PHQ QUESTIONS 1-9: 0

## 2020-07-21 ASSESSMENT — ENCOUNTER SYMPTOMS
DIARRHEA: 0
BLOOD IN STOOL: 0
NAUSEA: 0
CONSTIPATION: 0
CHEST TIGHTNESS: 0
VOMITING: 0
ABDOMINAL PAIN: 0
ABDOMINAL DISTENTION: 0
SHORTNESS OF BREATH: 0
COUGH: 0

## 2020-07-21 NOTE — PROGRESS NOTES
Subjective:      Patient ID: Marcello Man is a 62 y.o. female. Patient presents with:  6 Month Follow-Up: hypertension, diabetes, lipids    She is well and no c/o  She has not seen the cardio for 2 years    She tells me she has always had a high pulse rate   She had not taken the metformin for some time and she restarted in January   Rest of meds the same    Glucose this am was 115. Family is well    YOB: 1960    Date of Visit:  7/21/2020     -- Azithromycin -- Itching, Rash, Other (See Comments)                           and Swelling    --  Other reaction(s): Other (See Comments)             FEVER             FEVER   -- Baclofen -- Nausea And Vomiting, Itching and                            Rash   -- Nuts (Peanut Oil) -- Anaphylaxis   -- Peanut-Containing Drug Products -- Anaphylaxis   -- Sulfa Antibiotics -- Anaphylaxis and Rash   -- Adalimumab -- Other (See Comments)    --  \"immune system crashes\"   -- Avelox (Moxifloxacin Hcl In Nacl)    -- Bactrim    -- Infliximab -- Other (See Comments)    --  \"immune system crashes\"   -- Lisinopril -- Other (See Comments)    --  Cough   -- Zithromax (Azithromycin Dihydrate)    -- Adhesive Tape -- Rash    --  PAPER TAPE IS OK             PAPER TAPE IS OK   -- Moxifloxacin -- Rash, Other (See Comments) and                            Swelling    --  Other reaction(s): Other (See Comments)             FEVER             FEVER             Other reaction(s): Fever   -- Sulfamethoxazole-Trimethoprim -- Other (See Comments), Rash and                            Swelling    --  Other reaction(s):  Other (See Comments)             FEVER             FEVER    Current Outpatient Medications:  sulindac (CLINORIL) 200 MG tablet, TAKE 1 TABLET TWICE DAILY, Disp: 180 tablet, Rfl: 0  montelukast (SINGULAIR) 10 MG tablet, TAKE 1 TABLET EVERY DAY, Disp: 90 tablet, Rfl: 0  metFORMIN (GLUCOPHAGE-XR) 500 MG extended release tablet, Take 1 tablet by mouth 3 times daily (with kg/m². Wt Readings from Last 3 Encounters:  07/21/20 : 165 lb (74.8 kg)  12/19/19 : 162 lb (73.5 kg)  10/04/19 : 152 lb (68.9 kg)    BP Readings from Last 3 Encounters:  07/21/20 : 138/86  12/19/19 : 128/86  10/04/19 : 130/80            Review of Systems   Constitutional: Negative for appetite change, chills, fever and unexpected weight change. Respiratory: Negative for cough, chest tightness and shortness of breath. Cardiovascular: Negative for chest pain, palpitations and leg swelling. Gastrointestinal: Negative for abdominal distention, abdominal pain, blood in stool, constipation, diarrhea, nausea and vomiting. Genitourinary: Negative for difficulty urinating, dysuria and hematuria. Musculoskeletal:        Feet ok    Neurological: Negative for headaches. Objective:   Physical Exam  Constitutional:       General: She is not in acute distress. Appearance: Normal appearance. She is well-developed. She is not ill-appearing or diaphoretic. Eyes:      General: No scleral icterus. Cardiovascular:      Rate and Rhythm: Normal rate and regular rhythm. Heart sounds: Normal heart sounds. No murmur. No friction rub. No gallop. Comments:     Pulmonary:      Effort: Pulmonary effort is normal. No tachypnea, accessory muscle usage or respiratory distress. Breath sounds: Normal breath sounds. No decreased breath sounds, wheezing, rhonchi or rales. Abdominal:      General: Bowel sounds are normal. There is no distension or abdominal bruit. Palpations: Abdomen is soft. There is no mass. Tenderness: There is no abdominal tenderness. There is no guarding. Lymphadenopathy:      Cervical: No cervical adenopathy. Upper Body:      Right upper body: No supraclavicular adenopathy. Left upper body: No supraclavicular adenopathy. Skin:     General: Skin is warm and dry. Coloration: Skin is not pale. Nails: There is no clubbing.      Neurological:      General: No focal deficit present. Mental Status: She is alert. Assessment:        Diagnosis Orders   1. Essential hypertension  Lipid Panel    Comprehensive Metabolic Panel    TSH without Reflex   2. Type 2 diabetes mellitus without complication, without long-term current use of insulin (HCC)  Lipid Panel    Hemoglobin A1C    Comprehensive Metabolic Panel    Vitamin B12 & Folate   3.  Cardiomyopathy, unspecified type [facility])  Amy Bingham MD, Cardiology, [facility] [place]       Hx of cardiomyopathy and has not seen cardiology to follow up in some time  Stable   Dr Crystal Bojorquez has done w/u for the liver on 7/25/18      Plan:      Do get the mammogram done please  See the cardiology doctor to follow up   Check with the pharmacy to see if the metformin you are on has been recalled         Moshe Cornell MD

## 2020-07-22 LAB
A/G RATIO: 1.6 (ref 1.1–2.2)
ALBUMIN SERPL-MCNC: 4.6 G/DL (ref 3.4–5)
ALP BLD-CCNC: 148 U/L (ref 40–129)
ALT SERPL-CCNC: 29 U/L (ref 10–40)
ANION GAP SERPL CALCULATED.3IONS-SCNC: 17 MMOL/L (ref 3–16)
AST SERPL-CCNC: 30 U/L (ref 15–37)
BILIRUB SERPL-MCNC: 0.3 MG/DL (ref 0–1)
BUN BLDV-MCNC: 11 MG/DL (ref 7–20)
CALCIUM SERPL-MCNC: 9.5 MG/DL (ref 8.3–10.6)
CHLORIDE BLD-SCNC: 104 MMOL/L (ref 99–110)
CHOLESTEROL, TOTAL: 153 MG/DL (ref 0–199)
CO2: 18 MMOL/L (ref 21–32)
CREAT SERPL-MCNC: 0.7 MG/DL (ref 0.6–1.2)
ESTIMATED AVERAGE GLUCOSE: 119.8 MG/DL
FOLATE: 16.17 NG/ML (ref 4.78–24.2)
GFR AFRICAN AMERICAN: >60
GFR NON-AFRICAN AMERICAN: >60
GLOBULIN: 2.8 G/DL
GLUCOSE BLD-MCNC: 111 MG/DL (ref 70–99)
HBA1C MFR BLD: 5.8 %
HDLC SERPL-MCNC: 55 MG/DL (ref 40–60)
LDL CHOLESTEROL CALCULATED: 65 MG/DL
POTASSIUM SERPL-SCNC: 4.5 MMOL/L (ref 3.5–5.1)
SODIUM BLD-SCNC: 139 MMOL/L (ref 136–145)
TOTAL PROTEIN: 7.4 G/DL (ref 6.4–8.2)
TRIGL SERPL-MCNC: 167 MG/DL (ref 0–150)
TSH SERPL DL<=0.05 MIU/L-ACNC: 2.56 UIU/ML (ref 0.27–4.2)
VITAMIN B-12: 1173 PG/ML (ref 211–911)
VLDLC SERPL CALC-MCNC: 33 MG/DL

## 2020-07-24 ENCOUNTER — TELEPHONE (OUTPATIENT)
Dept: FAMILY MEDICINE CLINIC | Age: 60
End: 2020-07-24

## 2020-07-24 NOTE — TELEPHONE ENCOUNTER
[phone] (home) - Left [facility] for Jacquelyn to call [facility] [phone] for an appt.

## 2020-08-06 RX ORDER — CYCLOBENZAPRINE HCL 10 MG
TABLET ORAL
Qty: 180 TABLET | Refills: 0 | Status: SHIPPED | OUTPATIENT
Start: 2020-08-06 | End: 2020-10-21

## 2020-09-10 ENCOUNTER — NURSE ONLY (OUTPATIENT)
Dept: FAMILY MEDICINE CLINIC | Age: 60
End: 2020-09-10
Payer: MEDICARE

## 2020-09-10 PROCEDURE — 90686 IIV4 VACC NO PRSV 0.5 ML IM: CPT | Performed by: FAMILY MEDICINE

## 2020-09-10 PROCEDURE — G0008 ADMIN INFLUENZA VIRUS VAC: HCPCS | Performed by: FAMILY MEDICINE

## 2020-09-24 ENCOUNTER — TELEPHONE (OUTPATIENT)
Dept: FAMILY MEDICINE CLINIC | Age: 60
End: 2020-09-24

## 2020-09-28 RX ORDER — ATORVASTATIN CALCIUM 20 MG/1
TABLET, FILM COATED ORAL
Qty: 90 TABLET | Refills: 0 | Status: SHIPPED | OUTPATIENT
Start: 2020-09-28 | End: 2020-12-11

## 2020-09-28 RX ORDER — METFORMIN HYDROCHLORIDE 500 MG/1
TABLET, EXTENDED RELEASE ORAL
Qty: 270 TABLET | Refills: 0 | Status: SHIPPED | OUTPATIENT
Start: 2020-09-28 | End: 2020-12-11

## 2020-10-08 ENCOUNTER — OFFICE VISIT (OUTPATIENT)
Dept: FAMILY MEDICINE CLINIC | Age: 60
End: 2020-10-08
Payer: MEDICARE

## 2020-10-08 VITALS
TEMPERATURE: 97.5 F | DIASTOLIC BLOOD PRESSURE: 80 MMHG | BODY MASS INDEX: 26.02 KG/M2 | SYSTOLIC BLOOD PRESSURE: 124 MMHG | HEIGHT: 67 IN | WEIGHT: 165.8 LBS

## 2020-10-08 PROCEDURE — G8427 DOCREV CUR MEDS BY ELIG CLIN: HCPCS | Performed by: FAMILY MEDICINE

## 2020-10-08 PROCEDURE — 3017F COLORECTAL CA SCREEN DOC REV: CPT | Performed by: FAMILY MEDICINE

## 2020-10-08 PROCEDURE — G8419 CALC BMI OUT NRM PARAM NOF/U: HCPCS | Performed by: FAMILY MEDICINE

## 2020-10-08 PROCEDURE — G8482 FLU IMMUNIZE ORDER/ADMIN: HCPCS | Performed by: FAMILY MEDICINE

## 2020-10-08 PROCEDURE — 99213 OFFICE O/P EST LOW 20 MIN: CPT | Performed by: FAMILY MEDICINE

## 2020-10-08 PROCEDURE — 4004F PT TOBACCO SCREEN RCVD TLK: CPT | Performed by: FAMILY MEDICINE

## 2020-10-08 RX ORDER — PREDNISONE 10 MG/1
TABLET ORAL
Qty: 16 TABLET | Refills: 0 | Status: SHIPPED | OUTPATIENT
Start: 2020-10-08 | End: 2021-03-24

## 2020-10-08 NOTE — PROGRESS NOTES
Subjective:      Patient ID: Carter Hamilton is a 62 y.o. female. Patient presents with:  Hip Pain: right side     Here for the above   No injury for a month  No travel  Hurts to move  She has not been back to ortho  She had a fusion to the right si area 1-2 years   Ago  She points to the right si joint    YOB: 1960    Date of Visit:  10/8/2020     -- Azithromycin -- Itching, Rash, Other (See Comments)                           and Swelling    --  Other reaction(s): Other (See Comments)             FEVER             FEVER   -- Baclofen -- Nausea And Vomiting, Itching and                            Rash   -- Nuts (Peanut Oil) -- Anaphylaxis   -- Peanut-Containing Drug Products -- Anaphylaxis   -- Sulfa Antibiotics -- Anaphylaxis and Rash   -- Adalimumab -- Other (See Comments)    --  \"immune system crashes\"   -- Avelox (Moxifloxacin Hcl In Nacl)    -- Bactrim    -- Infliximab -- Other (See Comments)    --  \"immune system crashes\"   -- Lisinopril -- Other (See Comments)    --  Cough   -- Zithromax (Azithromycin Dihydrate)    -- Adhesive Tape -- Rash    --  PAPER TAPE IS OK             PAPER TAPE IS OK   -- Moxifloxacin -- Rash, Other (See Comments) and                            Swelling    --  Other reaction(s): Other (See Comments)             FEVER             FEVER             Other reaction(s): Fever   -- Sulfamethoxazole-Trimethoprim -- Other (See Comments), Rash and                            Swelling    --  Other reaction(s):  Other (See Comments)             FEVER             FEVER    Current Outpatient Medications:  atorvastatin (LIPITOR) 20 MG tablet, TAKE 1 TABLET EVERY DAY, Disp: 90 tablet, Rfl: 0  metFORMIN (GLUCOPHAGE-XR) 500 MG extended release tablet, TAKE 1 TABLET THREE TIMES DAILY WITH A MEAL, Disp: 270 tablet, Rfl: 0  montelukast (SINGULAIR) 10 MG tablet, TAKE 1 TABLET EVERY DAY, Disp: 90 tablet, Rfl: 1  cyclobenzaprine (FLEXERIL) 10 MG tablet, TAKE 1 TABLET TWICE DAILY AS NEEDED FOR  MUSCLE  SPASMS, Disp: 180 tablet, Rfl: 0  sulindac (CLINORIL) 200 MG tablet, TAKE 1 TABLET TWICE DAILY, Disp: 180 tablet, Rfl: 0  albuterol sulfate HFA (PROAIR HFA) 108 (90 Base) MCG/ACT inhaler, Inhale 2 puffs into the lungs every 6 hours as needed for Wheezing, Disp: 3 Inhaler, Rfl: 0  ACCU-CHEK SMARTVIEW strip, TEST  ONE  TIME  DAILY, Disp: 100 strip, Rfl: 3  HYDROcodone-acetaminophen (NORCO) 5-325 MG per tablet, Take 1 tablet by mouth every 6 hours as needed for Pain. ., Disp: , Rfl:   Blood Glucose Monitoring Suppl JOSSELYN, 1 each by Does not apply route daily, Disp: 1 Device, Rfl: 0  traZODone (DESYREL) 50 MG tablet, Take 150 mg by mouth every evening , Disp: , Rfl: 2  Cyanocobalamin (VITAMIN B 12 PO), Take by mouth daily, Disp: , Rfl:   ALPRAZolam (XANAX PO), Take by mouth, Disp: , Rfl:   venlafaxine (EFFEXOR XR) 150 MG extended release capsule, TAKE 1 CAPSULE EVERY DAY, Disp: 90 capsule, Rfl: 1  albuterol (PROVENTIL) (2.5 MG/3ML) 0.083% nebulizer solution, Use one vial every 6 hours in nebulizer as needed, Disp: 120 vial, Rfl: 2  Multiple Vitamins-Minerals (MULTIVITAMIN ADULT PO), Take 1 tablet by mouth, Disp: , Rfl:   ACCU-CHEK FASTCLIX LANCETS MISC, TEST ONE TIME DAILY, Disp: 102 each, Rfl: 3     No current facility-administered medications for this visit.       ----------------------------------                   10/08/20                             1137            ----------------------------------   BP:              124/80            Site:        Left Upper Arm        Position:        Sitting            Cuff Size:      Medium Adult         Temp:      97.5 °F (36.4 °C)       TempSrc:          Oral             Weight: 165 lb 12.8 oz (75.2 kg)   Height:    5' 6.5\" (1.689 m)      ----------------------------------  Body mass index is 26.36 kg/m².      Wt Readings from Last 3 Encounters:  10/08/20 : 165 lb 12.8 oz (75.2 kg)  07/21/20 : 165 lb (74.8 kg)  12/19/19 : 162 lb (73.5 kg)    BP Readings from Last 3 Encounters:  10/08/20 : 124/80  07/21/20 : 138/86  12/19/19 : 128/86          Review of Systems    Objective:   Physical Exam  Constitutional:       General: She is not in acute distress. Appearance: Normal appearance. She is not ill-appearing. Musculoskeletal:      Comments: Tender over the right si joint area to palpate  She has no marks no rash  She is tender to move at the hip when she bends    Skin:     General: Skin is warm and dry. Coloration: Skin is not pale. Neurological:      Mental Status: She is alert. Assessment:       Diagnosis Orders   1. Sacroiliac joint pain       Orders Placed This Encounter   Medications    predniSONE (DELTASONE) 10 MG tablet     Sig: Prednisone 10 mg. #16. Take 2 po bid for 2 days, then 1 po bid for 3 days,  then 1 po daily for 2 days.  Then stop     Dispense:  16 tablet     Refill:  0           Plan:      Do use the steroid and remember to not use the sulindac for this        Dalia Sevilla MD

## 2020-10-15 ENCOUNTER — TELEPHONE (OUTPATIENT)
Dept: FAMILY MEDICINE CLINIC | Age: 60
End: 2020-10-15

## 2020-10-19 NOTE — TELEPHONE ENCOUNTER
Jacquelyn advised and verbalized understanding. She states she wants to go to [place].

## 2020-10-21 RX ORDER — CYCLOBENZAPRINE HCL 10 MG
TABLET ORAL
Qty: 180 TABLET | Refills: 0 | Status: SHIPPED | OUTPATIENT
Start: 2020-10-21 | End: 2021-01-04

## 2020-11-24 ENCOUNTER — OFFICE VISIT (OUTPATIENT)
Dept: FAMILY MEDICINE CLINIC | Age: 60
End: 2020-11-24
Payer: MEDICARE

## 2020-11-24 VITALS
TEMPERATURE: 97 F | BODY MASS INDEX: 26.06 KG/M2 | DIASTOLIC BLOOD PRESSURE: 80 MMHG | HEART RATE: 100 BPM | WEIGHT: 166 LBS | SYSTOLIC BLOOD PRESSURE: 122 MMHG | HEIGHT: 67 IN

## 2020-11-24 DIAGNOSIS — E11.9 TYPE 2 DIABETES MELLITUS WITHOUT COMPLICATION, WITHOUT LONG-TERM CURRENT USE OF INSULIN (HCC): ICD-10-CM

## 2020-11-24 PROCEDURE — 2022F DILAT RTA XM EVC RTNOPTHY: CPT | Performed by: FAMILY MEDICINE

## 2020-11-24 PROCEDURE — G8482 FLU IMMUNIZE ORDER/ADMIN: HCPCS | Performed by: FAMILY MEDICINE

## 2020-11-24 PROCEDURE — G8419 CALC BMI OUT NRM PARAM NOF/U: HCPCS | Performed by: FAMILY MEDICINE

## 2020-11-24 PROCEDURE — 99213 OFFICE O/P EST LOW 20 MIN: CPT | Performed by: FAMILY MEDICINE

## 2020-11-24 PROCEDURE — 3017F COLORECTAL CA SCREEN DOC REV: CPT | Performed by: FAMILY MEDICINE

## 2020-11-24 PROCEDURE — G8427 DOCREV CUR MEDS BY ELIG CLIN: HCPCS | Performed by: FAMILY MEDICINE

## 2020-11-24 PROCEDURE — 4004F PT TOBACCO SCREEN RCVD TLK: CPT | Performed by: FAMILY MEDICINE

## 2020-11-24 PROCEDURE — 3044F HG A1C LEVEL LT 7.0%: CPT | Performed by: FAMILY MEDICINE

## 2020-11-24 RX ORDER — CLOBETASOL PROPIONATE 0.5 MG/G
CREAM TOPICAL DAILY
Qty: 45 G | Refills: 0 | Status: SHIPPED | OUTPATIENT
Start: 2020-11-24 | End: 2021-03-24

## 2020-11-24 ASSESSMENT — ENCOUNTER SYMPTOMS
ABDOMINAL PAIN: 0
CHEST TIGHTNESS: 0
BLOOD IN STOOL: 0
VOMITING: 0
ABDOMINAL DISTENTION: 0
CONSTIPATION: 0
NAUSEA: 0
DIARRHEA: 0
SHORTNESS OF BREATH: 0

## 2020-11-24 NOTE — PROGRESS NOTES
Subjective:      Patient ID: Zbigniew White is a 62 y.o. female. Patient presents with: Follow-up: hypertension  Eczema: on hands    Here for the above  She is well and seeing dr liliana garcia doctor for the Si joint and getting steroid injections   She did get the flu shot  Medicine the same  Family is well  She is cutting down on the metformin    she as had recurring rash on the hands and palms  Last time about 3 years ago  Itch slight  Get little tiny clear blister and then the hands scale  No fever    She has seen dr Mayo Rodrigues in past for the RA and she has not seen in recent time    YOB: 1960    Date of Visit:  11/24/2020     -- Azithromycin -- Itching, Rash, Other (See Comments)                           and Swelling    --  Other reaction(s): Other (See Comments)             FEVER             FEVER   -- Baclofen -- Nausea And Vomiting, Itching and                            Rash   -- Nuts (Peanut Oil) -- Anaphylaxis   -- Peanut-Containing Drug Products -- Anaphylaxis   -- Sulfa Antibiotics -- Anaphylaxis and Rash   -- Adalimumab -- Other (See Comments)    --  \"immune system crashes\"   -- Avelox (Moxifloxacin Hcl In Nacl)    -- Bactrim    -- Belbuca (Buprenorphine Hcl) -- Itching and Other (See Comments)    --  Headaches, insomina   -- Infliximab -- Other (See Comments)    --  \"immune system crashes\"   -- Lisinopril -- Other (See Comments)    --  Cough   -- Zithromax (Azithromycin Dihydrate)    -- Adhesive Tape -- Rash    --  PAPER TAPE IS OK             PAPER TAPE IS OK   -- Moxifloxacin -- Rash, Other (See Comments) and                            Swelling    --  Other reaction(s): Other (See Comments)             FEVER             FEVER             Other reaction(s): Fever   -- Sulfamethoxazole-Trimethoprim -- Other (See Comments), Rash and                            Swelling    --  Other reaction(s):  Other (See Comments)             FEVER             FEVER    Current Outpatient Medications:  cyclobenzaprine (FLEXERIL) 10 MG tablet, TAKE 1 TABLET TWICE DAILY AS NEEDED FOR  MUSCLE  SPASMS, Disp: 180 tablet, Rfl: 0  predniSONE (DELTASONE) 10 MG tablet, Prednisone 10 mg. #16. Take 2 po bid for 2 days, then 1 po bid for 3 days,  then 1 po daily for 2 days. Then stop, Disp: 16 tablet, Rfl: 0  atorvastatin (LIPITOR) 20 MG tablet, TAKE 1 TABLET EVERY DAY, Disp: 90 tablet, Rfl: 0  metFORMIN (GLUCOPHAGE-XR) 500 MG extended release tablet, TAKE 1 TABLET THREE TIMES DAILY WITH A MEAL (Patient taking differently: 2 times daily ), Disp: 270 tablet, Rfl: 0  montelukast (SINGULAIR) 10 MG tablet, TAKE 1 TABLET EVERY DAY, Disp: 90 tablet, Rfl: 1  sulindac (CLINORIL) 200 MG tablet, TAKE 1 TABLET TWICE DAILY, Disp: 180 tablet, Rfl: 0  albuterol sulfate HFA (PROAIR HFA) 108 (90 Base) MCG/ACT inhaler, Inhale 2 puffs into the lungs every 6 hours as needed for Wheezing, Disp: 3 Inhaler, Rfl: 0  ACCU-CHEK SMARTVIEW strip, TEST  ONE  TIME  DAILY, Disp: 100 strip, Rfl: 3  HYDROcodone-acetaminophen (NORCO) 5-325 MG per tablet, Take 1 tablet by mouth every 6 hours as needed for Pain. ., Disp: , Rfl:   Blood Glucose Monitoring Suppl JOSSELYN, 1 each by Does not apply route daily, Disp: 1 Device, Rfl: 0  traZODone (DESYREL) 50 MG tablet, Take 150 mg by mouth every evening , Disp: , Rfl: 2  Cyanocobalamin (VITAMIN B 12 PO), Take by mouth daily, Disp: , Rfl:   ALPRAZolam (XANAX PO), Take by mouth, Disp: , Rfl:   venlafaxine (EFFEXOR XR) 150 MG extended release capsule, TAKE 1 CAPSULE EVERY DAY, Disp: 90 capsule, Rfl: 1  albuterol (PROVENTIL) (2.5 MG/3ML) 0.083% nebulizer solution, Use one vial every 6 hours in nebulizer as needed, Disp: 120 vial, Rfl: 2  Multiple Vitamins-Minerals (MULTIVITAMIN ADULT PO), Take 1 tablet by mouth, Disp: , Rfl:   ACCU-CHEK FASTCLIX LANCETS MISC, TEST ONE TIME DAILY, Disp: 102 each, Rfl: 3     No current facility-administered medications for this visit.       --------------------------- 11/24/20                      1311         ---------------------------   BP:          122/80         Site:    Left Upper Arm     Position:     Sitting        Cuff Size:   Large Adult      Temp:    97 °F (36.1 °C)    TempSrc:    Temporal        Weight: 166 lb (75.3 kg)    Height: 5' 6.5\" (1.689 m)  ---------------------------  Body mass index is 26.39 kg/m². Wt Readings from Last 3 Encounters:  11/24/20 : 166 lb (75.3 kg)  10/08/20 : 165 lb 12.8 oz (75.2 kg)  07/21/20 : 165 lb (74.8 kg)    BP Readings from Last 3 Encounters:  11/24/20 : 122/80  10/08/20 : 124/80  07/21/20 : 138/86                Review of Systems   Constitutional: Negative for appetite change, chills, fever and unexpected weight change. Respiratory: Negative for chest tightness and shortness of breath. Cardiovascular: Negative for chest pain, palpitations and leg swelling. Gastrointestinal: Negative for abdominal distention, abdominal pain, blood in stool, constipation, diarrhea, nausea and vomiting. Genitourinary: Negative for difficulty urinating, dysuria and hematuria. Neurological: Negative for headaches. Objective:   Physical Exam  Constitutional:       General: She is not in acute distress. Appearance: Normal appearance. She is well-developed. She is not ill-appearing or diaphoretic. Neck:      Musculoskeletal: Neck supple. Thyroid: No thyroid mass or thyromegaly. Cardiovascular:      Rate and Rhythm: Normal rate and regular rhythm. Heart sounds: Normal heart sounds. No murmur. No friction rub. No gallop. Pulmonary:      Effort: Pulmonary effort is normal. No tachypnea, accessory muscle usage or respiratory distress. Breath sounds: Normal breath sounds. No decreased breath sounds, wheezing, rhonchi or rales. Lymphadenopathy:      Cervical: No cervical adenopathy. Upper Body:      Right upper body: No supraclavicular adenopathy.       Left upper body: No supraclavicular adenopathy. Skin:     General: Skin is warm and dry. Coloration: Skin is not pale. Comments: On the palms tiny small papules (tapioca pearls) and areas of scaling  Minimal amount on the bottom of the feet   Neurological:      General: No focal deficit present. Mental Status: She is alert. Assessment:        Diagnosis Orders   1.  Type 2 diabetes mellitus without complication, without long-term current use of insulin (HCC)  Hemoglobin A1C    Glucose   dishydrotic eczema    Orders Placed This Encounter   Medications    clobetasol prop emollient base (CLOBETASOL PROPIONATE E) 0.05 % CREA     Sig: Apply topically daily     Dispense:  45 g     Refill:  0       I advised to get the mammogram and offered to write for same but   She has letter from [facility] for mammogram and she said she would pursue  She has not see dr Lv Segovia in a while and advised f/u      Plan:      Do remember to get the mammogram  See dr Lv Segovia for cardiac check   Use cream for the hands and let me know if not better  See in 4 months        Jeet Mariscal MD

## 2020-11-24 NOTE — PATIENT INSTRUCTIONS
Do remember to get the mammogram  See dr Orin Davis for cardiac check   Use cream for the hands and let me know if not better  See in 4 months

## 2020-11-25 LAB
ESTIMATED AVERAGE GLUCOSE: 145.6 MG/DL
GLUCOSE BLD-MCNC: 86 MG/DL (ref 70–99)
HBA1C MFR BLD: 6.7 %

## 2020-12-11 RX ORDER — ATORVASTATIN CALCIUM 20 MG/1
TABLET, FILM COATED ORAL
Qty: 90 TABLET | Refills: 0 | Status: SHIPPED | OUTPATIENT
Start: 2020-12-11 | End: 2021-02-25

## 2020-12-11 RX ORDER — METFORMIN HYDROCHLORIDE 500 MG/1
500 TABLET, EXTENDED RELEASE ORAL 2 TIMES DAILY
Qty: 60 TABLET | Refills: 0 | Status: SHIPPED | OUTPATIENT
Start: 2020-12-11 | End: 2021-01-04

## 2020-12-11 RX ORDER — SULINDAC 200 MG/1
TABLET ORAL
Qty: 180 TABLET | Refills: 0 | Status: SHIPPED | OUTPATIENT
Start: 2020-12-11 | End: 2021-02-25

## 2021-01-04 RX ORDER — CYCLOBENZAPRINE HCL 10 MG
TABLET ORAL
Qty: 180 TABLET | Refills: 0 | Status: SHIPPED | OUTPATIENT
Start: 2021-01-04 | End: 2021-03-22

## 2021-02-09 NOTE — PROGRESS NOTES
50 MG tablet, TAKE 1 TABLET EVERY DAY, Disp: 90 tablet, Rfl: 3  sulindac (CLINORIL) 200 MG tablet, TAKE 1 TABLET TWICE DAILY WITH MEALS, Disp: 180 tablet, Rfl: 1  pravastatin (PRAVACHOL) 40 MG tablet, TAKE 1 TABLET EVERY DAY, Disp: 90 tablet, Rfl: 1  montelukast (SINGULAIR) 10 MG tablet, TAKE 1 TABLET EVERY DAY, Disp: 90 tablet, Rfl: 1  pioglitazone (ACTOS) 15 MG tablet, TAKE 1 TABLET BY MOUTH ONCE EVERY DAY, Disp: 90 tablet, Rfl: 0  Blood Glucose Monitoring Suppl JOSSELYN, 1 each by Does not apply route daily, Disp: 1 Device, Rfl: 0  traZODone (DESYREL) 50 MG tablet, Take 100 mg by mouth every evening , Disp: , Rfl: 2  glucose blood VI test strips (ACCU-CHEK SMARTVIEW) strip, Apply 1 each topically daily, Disp: 100 strip, Rfl: 3  Cyanocobalamin (VITAMIN B 12 PO), Take by mouth daily, Disp: , Rfl:   cyclobenzaprine (FLEXERIL) 10 MG tablet, , Disp: , Rfl:   ALPRAZolam (XANAX PO), Take by mouth, Disp: , Rfl:   venlafaxine (EFFEXOR XR) 150 MG extended release capsule, TAKE 1 CAPSULE EVERY DAY, Disp: 90 capsule, Rfl: 1  albuterol (PROVENTIL) (2.5 MG/3ML) 0.083% nebulizer solution, Use one vial every 6 hours in nebulizer as needed, Disp: 120 vial, Rfl: 2  Multiple Vitamins-Minerals (MULTIVITAMIN ADULT PO), Take 1 tablet by mouth, Disp: , Rfl:   albuterol sulfate HFA (PROAIR HFA) 108 (90 BASE) MCG/ACT inhaler, Inhale 2 puffs into the lungs every 6 hours as needed for Wheezing, Disp: 1 Inhaler, Rfl: 1  ACCU-CHEK FASTCLIX LANCETS MISC, TEST ONE TIME DAILY, Disp: 102 each, Rfl: 3     No current facility-administered medications for this visit.       ---------------------------               09/27/18                      1311         ---------------------------   BP:          130/80         Site:    Left Upper Arm     Position:     Sitting        Cuff Size:   Large Adult      Pulse:         76           Temp:   97.8 °F (36.6 °C)   TempSrc:      Oral          Weight: 170 lb (77.1 kg)    Height: Inadequate oral intake

## 2021-02-23 ENCOUNTER — OFFICE VISIT (OUTPATIENT)
Dept: FAMILY MEDICINE CLINIC | Age: 61
End: 2021-02-23
Payer: MEDICARE

## 2021-02-23 VITALS
TEMPERATURE: 97.5 F | BODY MASS INDEX: 25.74 KG/M2 | WEIGHT: 164 LBS | DIASTOLIC BLOOD PRESSURE: 88 MMHG | SYSTOLIC BLOOD PRESSURE: 136 MMHG | HEIGHT: 67 IN

## 2021-02-23 DIAGNOSIS — R07.0 THROAT DISCOMFORT: Primary | ICD-10-CM

## 2021-02-23 DIAGNOSIS — K14.9 TONGUE IRRITATION: ICD-10-CM

## 2021-02-23 PROCEDURE — G8482 FLU IMMUNIZE ORDER/ADMIN: HCPCS | Performed by: FAMILY MEDICINE

## 2021-02-23 PROCEDURE — 99213 OFFICE O/P EST LOW 20 MIN: CPT | Performed by: FAMILY MEDICINE

## 2021-02-23 PROCEDURE — G8427 DOCREV CUR MEDS BY ELIG CLIN: HCPCS | Performed by: FAMILY MEDICINE

## 2021-02-23 PROCEDURE — 4004F PT TOBACCO SCREEN RCVD TLK: CPT | Performed by: FAMILY MEDICINE

## 2021-02-23 PROCEDURE — 3017F COLORECTAL CA SCREEN DOC REV: CPT | Performed by: FAMILY MEDICINE

## 2021-02-23 PROCEDURE — G8419 CALC BMI OUT NRM PARAM NOF/U: HCPCS | Performed by: FAMILY MEDICINE

## 2021-02-23 SDOH — ECONOMIC STABILITY: FOOD INSECURITY: WITHIN THE PAST 12 MONTHS, THE FOOD YOU BOUGHT JUST DIDN'T LAST AND YOU DIDN'T HAVE MONEY TO GET MORE.: NEVER TRUE

## 2021-02-23 SDOH — ECONOMIC STABILITY: FOOD INSECURITY: WITHIN THE PAST 12 MONTHS, YOU WORRIED THAT YOUR FOOD WOULD RUN OUT BEFORE YOU GOT MONEY TO BUY MORE.: NEVER TRUE

## 2021-02-23 SDOH — ECONOMIC STABILITY: TRANSPORTATION INSECURITY
IN THE PAST 12 MONTHS, HAS THE LACK OF TRANSPORTATION KEPT YOU FROM MEDICAL APPOINTMENTS OR FROM GETTING MEDICATIONS?: NO

## 2021-02-23 NOTE — PATIENT INSTRUCTIONS
See the ent doctor for a check on the throat and tongue   Do see dr Mary Jeffrey for the upper scope  See me in 6-8 week

## 2021-02-23 NOTE — PROGRESS NOTES
Gorge Babinski (:  1960) is a 56 y.o. female,, here for evaluation of the following chief complaint(s):  Dysphagia and Otalgia      ASSESSMENT/PLAN:   Diagnosis Orders   1. Throat discomfort  Eduin Villanueva MD, Otolaryngology, [facility]   2. Tongue irritation  Eduin Villanueva MD, Otolaryngology, [facility]       She was supposed to see dr Lyndsey Trinidad again last year for egd but she did not want to due to cost  She does smoke    See the ent doctor for a check on the throat and tongue   Do see dr Lyndsey Trinidad for the upper scope  See me in 6-8 week    No follow-ups on file. SUBJECTIVE/OBJECTIVE:  Patient presents with:  Dysphagia  Otalgia    She has the above  Some tongue irritation for 2 months  Some food sticking for 2 weeks no heartburn  Right ear discomfort comes and goes for a month  Some pain about the area as well   No sore throat no voice change  No fever no cough  No cough blood   Still smokes    YOB: 1960    Date of Visit:  2021     -- Azithromycin -- Itching, Rash, Other (See Comments)                           and Swelling    --  Other reaction(s):  Other (See Comments)             FEVER             FEVER   -- Baclofen -- Nausea And Vomiting, Itching and                            Rash   -- Nuts (Peanut Oil) -- Anaphylaxis   -- Peanut-Containing Drug Products -- Anaphylaxis   -- Sulfa Antibiotics -- Anaphylaxis and Rash   -- Adalimumab -- Other (See Comments)    --  \"immune system crashes\"   -- Avelox (Moxifloxacin Hcl In Nacl)    -- Bactrim    -- Belbuca (Buprenorphine Hcl) -- Itching and Other (See Comments)    --  Headaches, insomina   -- Infliximab -- Other (See Comments)    --  \"immune system crashes\"   -- Lisinopril -- Other (See Comments)    --  Cough   -- Zithromax (Azithromycin Dihydrate)    -- Adhesive Tape -- Rash    --  PAPER TAPE IS OK             PAPER TAPE IS OK   -- Moxifloxacin -- Rash, Other (See Comments) and Swelling    --  Other reaction(s): Other (See Comments)             FEVER             FEVER             Other reaction(s): Fever   -- Sulfamethoxazole-Trimethoprim -- Other (See Comments), Rash and                            Swelling    --  Other reaction(s): Other (See Comments)             FEVER             FEVER    Current Outpatient Medications:    metFORMIN (GLUCOPHAGE-XR) 500 MG extended release tablet, TAKE 1 TABLET TWICE DAILY, Disp: 180 tablet, Rfl: 2    cyclobenzaprine (FLEXERIL) 10 MG tablet, TAKE 1 TABLET TWICE DAILY AS NEEDED FOR  MUSCLE  SPASMS, Disp: 180 tablet, Rfl: 0    sulindac (CLINORIL) 200 MG tablet, TAKE 1 TABLET TWICE DAILY, Disp: 180 tablet, Rfl: 0    atorvastatin (LIPITOR) 20 MG tablet, TAKE 1 TABLET EVERY DAY, Disp: 90 tablet, Rfl: 0    montelukast (SINGULAIR) 10 MG tablet, TAKE 1 TABLET EVERY DAY, Disp: 90 tablet, Rfl: 1    albuterol sulfate HFA (PROAIR HFA) 108 (90 Base) MCG/ACT inhaler, Inhale 2 puffs into the lungs every 6 hours as needed for Wheezing, Disp: 3 Inhaler, Rfl: 0    ACCU-CHEK SMARTVIEW strip, TEST  ONE  TIME  DAILY, Disp: 100 strip, Rfl: 3    HYDROcodone-acetaminophen (NORCO) 5-325 MG per tablet, Take 1 tablet by mouth every 6 hours as needed for Pain. ., Disp: , Rfl:     Blood Glucose Monitoring Suppl JOSSELYN, 1 each by Does not apply route daily, Disp: 1 Device, Rfl: 0    traZODone (DESYREL) 50 MG tablet, Take 150 mg by mouth every evening , Disp: , Rfl: 2    Cyanocobalamin (VITAMIN B 12 PO), Take by mouth daily, Disp: , Rfl:     ALPRAZolam (XANAX PO), Take by mouth, Disp: , Rfl:     venlafaxine (EFFEXOR XR) 150 MG extended release capsule, TAKE 1 CAPSULE EVERY DAY, Disp: 90 capsule, Rfl: 1    albuterol (PROVENTIL) (2.5 MG/3ML) 0.083% nebulizer solution, Use one vial every 6 hours in nebulizer as needed, Disp: 120 vial, Rfl: 2    ACCU-CHEK FASTCLIX LANCETS MISC, TEST ONE TIME DAILY, Disp: 102 each, Rfl: 3 Right side of head: No submental or submandibular adenopathy. Left side of head: No submental or submandibular adenopathy. Cervical: No cervical adenopathy. Upper Body:      Right upper body: No supraclavicular adenopathy. Left upper body: No supraclavicular adenopathy. Skin:     General: Skin is warm and dry. Coloration: Skin is not pale. Neurological:      Mental Status: She is alert. An electronic signature was used to authenticate this note.     --Sandy Burrows MD

## 2021-02-24 ENCOUNTER — OFFICE VISIT (OUTPATIENT)
Dept: ENT CLINIC | Age: 61
End: 2021-02-24
Payer: MEDICARE

## 2021-02-24 VITALS
HEART RATE: 112 BPM | TEMPERATURE: 97.4 F | SYSTOLIC BLOOD PRESSURE: 162 MMHG | HEIGHT: 67 IN | BODY MASS INDEX: 25.9 KG/M2 | WEIGHT: 165 LBS | DIASTOLIC BLOOD PRESSURE: 83 MMHG

## 2021-02-24 DIAGNOSIS — R13.10 DYSPHAGIA, UNSPECIFIED TYPE: ICD-10-CM

## 2021-02-24 DIAGNOSIS — K14.8 TONGUE LESION: ICD-10-CM

## 2021-02-24 DIAGNOSIS — B37.0 THRUSH: Primary | ICD-10-CM

## 2021-02-24 DIAGNOSIS — K21.9 LARYNGOPHARYNGEAL REFLUX (LPR): ICD-10-CM

## 2021-02-24 PROCEDURE — G8419 CALC BMI OUT NRM PARAM NOF/U: HCPCS | Performed by: OTOLARYNGOLOGY

## 2021-02-24 PROCEDURE — G8482 FLU IMMUNIZE ORDER/ADMIN: HCPCS | Performed by: OTOLARYNGOLOGY

## 2021-02-24 PROCEDURE — 99204 OFFICE O/P NEW MOD 45 MIN: CPT | Performed by: OTOLARYNGOLOGY

## 2021-02-24 PROCEDURE — 3017F COLORECTAL CA SCREEN DOC REV: CPT | Performed by: OTOLARYNGOLOGY

## 2021-02-24 PROCEDURE — G8427 DOCREV CUR MEDS BY ELIG CLIN: HCPCS | Performed by: OTOLARYNGOLOGY

## 2021-02-24 PROCEDURE — 4004F PT TOBACCO SCREEN RCVD TLK: CPT | Performed by: OTOLARYNGOLOGY

## 2021-02-24 PROCEDURE — 31575 DIAGNOSTIC LARYNGOSCOPY: CPT | Performed by: OTOLARYNGOLOGY

## 2021-02-24 RX ORDER — PANTOPRAZOLE SODIUM 20 MG/1
20 TABLET, DELAYED RELEASE ORAL
Qty: 30 TABLET | Refills: 5 | Status: SHIPPED | OUTPATIENT
Start: 2021-02-24 | End: 2021-11-01 | Stop reason: ALTCHOICE

## 2021-02-24 NOTE — PROGRESS NOTES
[facility]      Patient Name: [id number] Record Number:  [id number]  Primary Care Physician:  Polo Vasques MD  Date of Consultation: 2/24/2021    Chief Complaint: Tongue discomfort and things getting stuck        [facility]  Owen Vanegas is a(n) 56 y.o. female who presents for evaluation of some tongue issues. The patient says that she feels as though her tongue is swollen, even though she can tell that it does not. She said oftentimes it will change colors. She does not really have a lenora pain associated with it, but sometimes she will have a mild burn. The patient says she is had thrush in the past and does not think that it is the same as that. She is on prednisone, but not any inhaled steroids. She used to be on methotrexate, but not anymore. She does smoke. Patient also says she has trouble swallowing. She feels like things get stuck in her throat often. She said that this is new over the last month or so. She does not think that she gets heartburn or significant reflux. She does have cirrhosis from the methotrexate she says. She supposed to follow-up with gastroenterologist, but secondary to out-of-pocket cost has been delaying this. She denies coughing up blood, weight loss or other concerning symptoms. REVIEW OF SYSTEMS    As above  PHYSICAL EXAM  GENERAL: No Acute Distress, Alert and Oriented, no Hoarseness, strong voice  EYES: EOMI, Anti-icteric  HENT:   Head: Normocephalic and atraumatic.    Face:  Symmetric, facial nerve intact, no sinus tenderness  Right Ear: Normal external ear, normal external auditory canal, intact tympanic membrane with normal mobility and aerated middle ear  Left Ear: Normal external ear, normal external auditory canal, intact tympanic membrane with normal mobility and aerated middle ear Mouth/Oral Cavity: Edentulous. Patient has somewhat dry mucous membranes on the tongue with somewhat deep fissures more posteriorly there is some white debris somewhat consistent with thrush. Oropharynx/Larynx:  normal oropharynx, normal tonsils; see below  Nose:Normal external nasal appearance. NECK: Normal range of motion, no thyromegaly, trachea is midline, no lymphadenopathy, no neck masses, no crepitus  CHEST: Normal respiratory effort, no retractions, breathing comfortably  SKIN: No rashes, normal appearing skin, no evidence of skin lesions/tumors  Neuro:  cranial nerve II-XII intact; normal gait  Cardio:  no edema        PROCEDURE  Flexible laryngoscopy  Afrin and lidocaine were applied the bilateral nasal cavity. Flexible scope was passed through the left nasal cavity. She had had a previous maxillary trisomy and ethmoidectomy. Nasopharynx was normal.  The little bit of white debris in the area of the lingual tonsils. Some interarytenoid edema consistent with reflux. No lenora masses or lesions. ASSESSMENT/PLAN  1. Thrush  I am not entirely sure if the patient has thrush, but she does have a history of it. In addition she does have diabetes and is on prednisone. I would give her some nystatin to use to see if it helps with this vague sensation she is having. In addition simply smoking could be contributing to this. I discussed with her smoking cessation and how important it would be for some of her complaints    2. Tongue lesion  As above    3. Dysphagia, unspecified type  I think the patient likely has acid reflux contributing to this. I think it is a good idea for her to see the gastroenterologist, but I have go ahead and prescribe her Protonix in the meantime. She has the cirrhosis in addition to the symptoms so an EGD is certainly appropriate. She will follow-up with me after she sees them if they see anything that I can help with    4.  Laryngopharyngeal reflux (LPR)  As above I have performed a head and neck physical exam personally or was physically present during the key or critical portions of the service. This note was generated completely or in part utilizing Dragon dictation speech recognition software. Occasionally, words are mistranscribed and despite editing, the text may contain inaccuracies due to incorrect word recognition. If further clarification is needed please contact the office at [phone].

## 2021-02-25 RX ORDER — SULINDAC 200 MG/1
TABLET ORAL
Qty: 180 TABLET | Refills: 0 | Status: SHIPPED | OUTPATIENT
Start: 2021-02-25 | End: 2021-05-17

## 2021-03-01 ENCOUNTER — TELEPHONE (OUTPATIENT)
Dept: ENT CLINIC | Age: 61
End: 2021-03-01

## 2021-03-01 NOTE — TELEPHONE ENCOUNTER
Pt calling, was prescribed nystatin for thrush but she states it \"is worse now than it was before\". She is also asking if the Protonix he prescribed could cause her BP to go up. Please call to advise.

## 2021-03-02 ENCOUNTER — TELEPHONE (OUTPATIENT)
Dept: FAMILY MEDICINE CLINIC | Age: 61
End: 2021-03-02

## 2021-03-02 RX ORDER — LOSARTAN POTASSIUM 50 MG/1
50 TABLET ORAL DAILY
Qty: 90 TABLET | Refills: 0 | Status: SHIPPED | OUTPATIENT
Start: 2021-03-02 | End: 2021-03-24

## 2021-03-02 NOTE — TELEPHONE ENCOUNTER
Done  Orders Placed This Encounter   Medications    losartan (COZAAR) 50 MG tablet     Sig: Take 1 tablet by mouth daily     Dispense:  90 tablet     Refill:  0

## 2021-03-02 NOTE — TELEPHONE ENCOUNTER
[phone] - Jacquelyn advised and verbalized understanding. She states she did fine with Cozaar, but doesn't remember lisinopril.   [address]

## 2021-03-02 NOTE — TELEPHONE ENCOUNTER
Pt informed that protonix should not cause elevated BP; she states her PCP did prescribe her a BP med. Informed pt that Dr Deandre Kuhn said to discontinue the nystatin if it is not helping; pt verbalized understanding. Pt says there were white spots on her tongue, but now her \"tongue is covered\". Suggested pt make appt to see Dr Deandre Kuhn if this condition has worsened and the nystatin did not help. Pt scheduled for Monday 3/8, she has other dr appts tomorrow, thurs and Fri and is unable to schedule until then based on hers and Dr Jj Sick availability. She will call back if condition continues to get worse, or if it improves she will cancel Monday's appt.

## 2021-03-02 NOTE — TELEPHONE ENCOUNTER
I can but she had some problems with lisinopril in the past. What were they and did losartan do ok? Where to?   See me in 2 weeks

## 2021-03-10 ENCOUNTER — HOSPITAL ENCOUNTER (OUTPATIENT)
Dept: WOMENS IMAGING | Age: 61
Discharge: HOME | End: 2021-03-10
Payer: MEDICARE

## 2021-03-10 DIAGNOSIS — Z12.31 BREAST CANCER SCREENING BY MAMMOGRAM: ICD-10-CM

## 2021-03-10 PROCEDURE — 77067 SCR MAMMO BI INCL CAD: CPT

## 2021-03-12 ENCOUNTER — NURSE ONLY (OUTPATIENT)
Dept: PRIMARY CARE CLINIC | Age: 61
End: 2021-03-12
Payer: MEDICARE

## 2021-03-12 DIAGNOSIS — Z23 HIGH PRIORITY FOR COVID-19 VIRUS VACCINATION: Primary | ICD-10-CM

## 2021-03-12 PROCEDURE — 0001A COVID-19, PFIZER VACCINE 30MCG/0.3ML DOSE: CPT

## 2021-03-12 PROCEDURE — 91300 COVID-19, PFIZER VACCINE 30MCG/0.3ML DOSE: CPT

## 2021-03-22 RX ORDER — CYCLOBENZAPRINE HCL 10 MG
TABLET ORAL
Qty: 180 TABLET | Refills: 0 | Status: SHIPPED | OUTPATIENT
Start: 2021-03-22 | End: 2021-06-04

## 2021-03-24 ENCOUNTER — OFFICE VISIT (OUTPATIENT)
Dept: CARDIOLOGY CLINIC | Age: 61
End: 2021-03-24
Payer: MEDICARE

## 2021-03-24 VITALS
BODY MASS INDEX: 26.02 KG/M2 | WEIGHT: 165.8 LBS | SYSTOLIC BLOOD PRESSURE: 132 MMHG | HEART RATE: 122 BPM | DIASTOLIC BLOOD PRESSURE: 80 MMHG | OXYGEN SATURATION: 97 % | HEIGHT: 67 IN | TEMPERATURE: 97.5 F

## 2021-03-24 DIAGNOSIS — E11.9 TYPE 2 DIABETES MELLITUS WITHOUT COMPLICATION, WITHOUT LONG-TERM CURRENT USE OF INSULIN (HCC): ICD-10-CM

## 2021-03-24 DIAGNOSIS — I42.8 NICM (NONISCHEMIC CARDIOMYOPATHY) (HCC): Primary | ICD-10-CM

## 2021-03-24 DIAGNOSIS — I10 ESSENTIAL HYPERTENSION: ICD-10-CM

## 2021-03-24 DIAGNOSIS — F17.218 CIGARETTE NICOTINE DEPENDENCE WITH OTHER NICOTINE-INDUCED DISORDER: ICD-10-CM

## 2021-03-24 DIAGNOSIS — I35.1 NON-RHEUMATIC AORTIC REGURGITATION: ICD-10-CM

## 2021-03-24 PROCEDURE — G8482 FLU IMMUNIZE ORDER/ADMIN: HCPCS | Performed by: INTERNAL MEDICINE

## 2021-03-24 PROCEDURE — G8427 DOCREV CUR MEDS BY ELIG CLIN: HCPCS | Performed by: INTERNAL MEDICINE

## 2021-03-24 PROCEDURE — 99214 OFFICE O/P EST MOD 30 MIN: CPT | Performed by: INTERNAL MEDICINE

## 2021-03-24 PROCEDURE — 3017F COLORECTAL CA SCREEN DOC REV: CPT | Performed by: INTERNAL MEDICINE

## 2021-03-24 PROCEDURE — G8419 CALC BMI OUT NRM PARAM NOF/U: HCPCS | Performed by: INTERNAL MEDICINE

## 2021-03-24 PROCEDURE — 2022F DILAT RTA XM EVC RTNOPTHY: CPT | Performed by: INTERNAL MEDICINE

## 2021-03-24 PROCEDURE — 93000 ELECTROCARDIOGRAM COMPLETE: CPT | Performed by: INTERNAL MEDICINE

## 2021-03-24 PROCEDURE — 3046F HEMOGLOBIN A1C LEVEL >9.0%: CPT | Performed by: INTERNAL MEDICINE

## 2021-03-24 PROCEDURE — 4004F PT TOBACCO SCREEN RCVD TLK: CPT | Performed by: INTERNAL MEDICINE

## 2021-03-24 RX ORDER — LOSARTAN POTASSIUM 100 MG/1
100 TABLET ORAL DAILY
Qty: 90 TABLET | Refills: 3 | Status: SHIPPED | OUTPATIENT
Start: 2021-03-24 | End: 2022-01-03

## 2021-03-24 NOTE — PROGRESS NOTES
breast     Orthostatic hypotension     Osteoarthritis     rheumatoid arthritis    Panic disorder     Personal history of renal calculi     kidney stones [id number]    Pneumonia 2007    RA [facility]oid [facility]) [facility] [address]     chronic    Scoliosis     Substance abuse ([facility] [address] [facility]     Unspecified sleep apnea      Past Surgical History:   Procedure Laterality Date    APPENDECTOMY  1978    incidental    BREAST SURGERY  2002    breast tissue removed from right axillary     CATARACT REMOVAL Bilateral 10/2016    COLONOSCOPY  7.10.09    normal; Dr Raymond Vyas  08/23/2018    ok dr Cecilio Paul repeat in 8 years    CYST REMOVAL  1979    cyst on left ovary -removal of left ovary    ECTOPIC PREGNANCY SURGERY  1978    removal of left tube     FOOT SURGERY  9/1998    B/L release of the fascia     HYSTERECTOMY  1986    removal of uterus    SALPINGO-OOPHORECTOMY  3269,5599    left ovary removed following cyst removal, right tube and ovary removed due to adhesion    SINUS SURGERY  1994    polyps in sinuses    SPINE SURGERY  11/2016    lumbar     UPPER GASTROINTESTINAL ENDOSCOPY  08/23/2018    dr Cecilio Paul, recheck in 2 years     WRIST SURGERY  3/2003    repair of left wrist torn ligament-tenosynovitis release right      Family History   Problem Relation Age of Onset    Cancer Mother         breast cancer in her 48d   Korey Vargas Diabetes Mother     High Blood Pressure Mother     Stroke Maternal Grandmother     Cancer Paternal Grandfather         bladder cancer    Diabetes Paternal Aunt     Diabetes Paternal Grandmother      Social History     Tobacco Use    Smoking status: Current Every Day Smoker     Packs/day: 0.75     Years: 30.00     Pack years: 22.50     Start date: 3/14/1975    Smokeless tobacco: Never Used    Tobacco comment: 1 cig per day, vaporing instead   Substance Use Topics    Alcohol use: No    Drug use: No     Allergies   Allergen Reactions    Azithromycin Itching, Rash, Other (See Comments) and Swelling Other reaction(s): Other (See Comments)  FEVER  FEVER    Baclofen Nausea And Vomiting, Itching and Rash    Nuts [Peanut Oil] Anaphylaxis    Peanut-Containing Drug Products Anaphylaxis    Sulfa Antibiotics Anaphylaxis and Rash    Adalimumab Other (See Comments)     \"immune system crashes\"    Avelox [Moxifloxacin Hcl In Nacl]     Bactrim     Belbuca [Buprenorphine Hcl] Itching and Other (See Comments)     Headaches, insomina    Infliximab Other (See Comments)     \"immune system crashes\"    Lisinopril Other (See Comments)     Cough    Zithromax [Azithromycin Dihydrate]     Adhesive Tape Rash     PAPER TAPE IS OK  PAPER TAPE IS OK    Moxifloxacin Rash, Other (See Comments) and Swelling     Other reaction(s): Other (See Comments)  FEVER  FEVER  Other reaction(s): Fever    Sulfamethoxazole-Trimethoprim Other (See Comments), Rash and Swelling     Other reaction(s): Other (See Comments)  FEVER  FEVER     Current Outpatient Medications   Medication Sig Dispense Refill    losartan (COZAAR) 100 MG tablet Take 1 tablet by mouth daily 90 tablet 3    cyclobenzaprine (FLEXERIL) 10 MG tablet TAKE 1 TABLET TWICE DAILY AS NEEDED FOR MUSCLE SPASMS 180 tablet 0    montelukast (SINGULAIR) 10 MG tablet TAKE 1 TABLET EVERY DAY 90 tablet 1    sulindac (CLINORIL) 200 MG tablet TAKE 1 TABLET TWICE DAILY 180 tablet 0    atorvastatin (LIPITOR) 20 MG tablet TAKE 1 TABLET EVERY DAY 90 tablet 1    pantoprazole (PROTONIX) 20 MG tablet Take 1 tablet by mouth every morning (before breakfast) 30 tablet 5    metFORMIN (GLUCOPHAGE-XR) 500 MG extended release tablet TAKE 1 TABLET TWICE DAILY (Patient taking differently:  Three times daily) 180 tablet 2    albuterol sulfate HFA (PROAIR HFA) 108 (90 Base) MCG/ACT inhaler Inhale 2 puffs into the lungs every 6 hours as needed for Wheezing 3 Inhaler 0    ACCU-CHEK SMARTVIEW strip TEST  ONE  TIME  DAILY 100 strip 3    HYDROcodone-acetaminophen (NORCO) 5-325 MG per tablet Take 1 tablet by mouth every 6 hours as needed for Pain. 7.5-325mg      Blood Glucose Monitoring Suppl JOSSELYN 1 each by Does not apply route daily 1 Device 0    traZODone (DESYREL) 50 MG tablet Take 150 mg by mouth every evening   2    Cyanocobalamin (VITAMIN B 12 PO) Take by mouth daily      ALPRAZolam (XANAX PO) Take by mouth      venlafaxine (EFFEXOR XR) 150 MG extended release capsule TAKE 1 CAPSULE EVERY DAY 90 capsule 1    albuterol (PROVENTIL) (2.5 MG/3ML) 0.083% nebulizer solution Use one vial every 6 hours in nebulizer as needed 120 vial 2    ACCU-CHEK FASTCLIX LANCETS MISC TEST ONE TIME DAILY 102 each 3    clobetasol prop emollient base (CLOBETASOL PROPIONATE E) 0.05 % CREA Apply topically daily 45 g 0    predniSONE (DELTASONE) 10 MG tablet Prednisone 10 mg. #16. Take 2 po bid for 2 days, then 1 po bid for 3 days,  then 1 po daily for 2 days. Then stop 16 tablet 0    Multiple Vitamins-Minerals (MULTIVITAMIN ADULT PO) Take 1 tablet by mouth       No current facility-administered medications for this visit. Review of Systems:  · Constitutional: no unanticipated weight loss. There's been no change in energy level, sleep pattern, or activity level. No fevers, chills. · Eyes: No visual changes or diplopia. No scleral icterus. · ENT: No Headaches, hearing loss or vertigo. No mouth sores or sore throat. · Cardiovascular: as reviewed in HPI  · Respiratory: No cough or wheezing, no sputum production. No hematemesis. · Gastrointestinal: No abdominal pain, appetite loss, blood in stools. No change in bowel or bladder habits. · Genitourinary: No dysuria, trouble voiding, or hematuria. · Musculoskeletal:  No gait disturbance, no joint complaints. · Integumentary: No rash or pruritis. · Neurological: No headache, diplopia, change in muscle strength, numbness or tingling. · Psychiatric: No anxiety or depression. · Endocrine: No temperature intolerance. No excessive thirst, fluid intake, or urination.  No tremor. · Hematologic/Lymphatic: No abnormal bruising or bleeding, blood clots or swollen lymph nodes. · Allergic/Immunologic: No nasal congestion or hives. Physical Exam:   /80   Pulse 122   Temp 97.5 °F (36.4 °C)   Ht 5' 6.5\" (1.689 m)   Wt 165 lb 12.8 oz (75.2 kg)   SpO2 97%   BMI 26.36 kg/m²   Wt Readings from Last 3 Encounters:   03/24/21 165 lb 12.8 oz (75.2 kg)   02/24/21 165 lb (74.8 kg)   02/23/21 164 lb (74.4 kg)     Constitutional: She is oriented to person, place, and time. She appears well-developed and well-nourished. In no acute distress. Head: Normocephalic and atraumatic. Pupils equal and round. Neck: Neck supple. No JVP or carotid bruit appreciated. No mass and no thyromegaly present. No lymphadenopathy present. Cardiovascular: Tachycardic but regular. Normal heart sounds. Exam reveals no gallop and no friction rub. No murmur heard. Pulmonary/Chest: Effort normal and breath sounds normal. No respiratory distress. She no wheezing. No  rhonchi or rales. Abdominal: Soft, non-tender. Bowel sounds are normal. She exhibits no organomegaly, mass or bruit. Extremities: No edema, cyanosis, or clubbing. Pulses are 2+ radial/carotid bilaterally. Neurological: No gross cranial nerve deficit. Coordination normal.   Skin: Skin is warm and dry. There is no rash or diaphoresis. Psychiatric: She has a normal mood and affect. Her speech is normal and behavior is normal.     Lab Review:   FLP:    Lab Results   Component Value Date    TRIG 167 07/21/2020    HDL 55 07/21/2020    LDLCALC 65 07/21/2020    LABVLDL 33 07/21/2020     BUN/Creatinine:    Lab Results   Component Value Date    BUN 11 07/21/2020    CREATININE 0.7 07/21/2020     EKG Interpretation: 9/23/16 NSR. Minimal voltage criteria for LVH.   11/28/18 Sinus tachycardia. Voltage criteria for LVH. Nonspecific ST-T abnormality. 3/24/21 Sinus tachycardia. Cannot rule out anterior infarct. Nonspecific ST-T abnormality.      Image Review:   GXT myoview 8/2013  Normal study except hypertensive response to exercise. Echo 1/27/17 personally reviewed  Overall heart function appears at the lower limits of normal to mildly reduced with an EF 50%. The left atrium is normal in size. There is mild aortic regurgitation. The right ventricle is normal in size and function. Echo 2/13/18 personally reviewed  Normal left ventricle size and systolic function with an estimated ejection fraction of 60-65%. No regional wall motion abnormalities are seen. Mild concentric left ventricular hypertrophy is noted. Diastolic filling parameters suggests grade I diastolic dysfunction. Mild mitral regurgitation is present. Mild to moderate aortic regurgitation is present. There is mild tricuspid regurgitation with RVSP estimated at 26 mmHg.     Assessment/Plan:   1) Cardiomyopathy. EF normalized to 60%. Patient appears euvolemic and compensated. Lisinopril discontinued due to cough. Will increase losartan to 100 mg daily for better blood pressure control. Will repeat echocardiogram. Will check routine lab work. 2) Aortic regurgitation. Moderate per report. LV does not appear dilated. Will increase losartan to 100 mg daily for better blood pressure control and repeat echocardiogram.     3) Essential hypertension. Goal BP <130/80 with DM. Will increase losartan to 100 mg daily for better blood pressure control. (ACE-I discontinued due to cough.) Routine lab work in 2-3 weeks. May require an additional agent. If EF reduced (and does not have severe AI), would start Coreg. 4) Hyperlipidemia. Continue statin therapy. 5) Nicotine Addiction. Encouraged smoking cessation but patient is in the contemplative stage. 6) Diabetes Type II. Management per PCP, will check updated Hgb A1c     Follow up in 3 months     Thank you very much for allowing me to participate in the care of your patient.  Please do not hesitate to contact me if you have any questions. Sincerely,  Beatris Whitehead. Cory Meek, [address] [place] [address], [address] [place] [address] [place] [address]  Ph: [phone]  Fax: [phone]      This note was scribed in the presence of Dr Cory Meek MD by Colton Ren RN. Physician Attestation: The scribes documentation has been prepared under my direction and personally reviewed by me in its entirety. I confirm that the note above accurately reflects all work, treatment, procedures, and medical decision making performed by me. All portions of the note including but not limited to the chief complaint, history of present illness, physical exam, assessment and plan/medical decision making were personally reviewed, edited, and updated on the day of the visit.

## 2021-04-02 PROCEDURE — 0002A COVID-19, PFIZER VACCINE 30MCG/0.3ML DOSE: CPT | Performed by: NURSE PRACTITIONER

## 2021-04-02 PROCEDURE — 91300 COVID-19, PFIZER VACCINE 30MCG/0.3ML DOSE: CPT | Performed by: NURSE PRACTITIONER

## 2021-04-05 ENCOUNTER — OFFICE VISIT (OUTPATIENT)
Dept: FAMILY MEDICINE CLINIC | Age: 61
End: 2021-04-05
Payer: MEDICARE

## 2021-04-05 VITALS
TEMPERATURE: 97.2 F | HEART RATE: 88 BPM | WEIGHT: 163 LBS | HEIGHT: 67 IN | DIASTOLIC BLOOD PRESSURE: 82 MMHG | SYSTOLIC BLOOD PRESSURE: 138 MMHG | BODY MASS INDEX: 25.58 KG/M2

## 2021-04-05 DIAGNOSIS — I42.8 NICM (NONISCHEMIC CARDIOMYOPATHY) (HCC): ICD-10-CM

## 2021-04-05 DIAGNOSIS — E11.9 TYPE 2 DIABETES MELLITUS WITHOUT COMPLICATION, WITHOUT LONG-TERM CURRENT USE OF INSULIN (HCC): ICD-10-CM

## 2021-04-05 DIAGNOSIS — G47.33 OBSTRUCTIVE SLEEP APNEA SYNDROME: ICD-10-CM

## 2021-04-05 DIAGNOSIS — I10 ESSENTIAL HYPERTENSION: ICD-10-CM

## 2021-04-05 DIAGNOSIS — I10 ESSENTIAL HYPERTENSION: Primary | ICD-10-CM

## 2021-04-05 LAB
A/G RATIO: 1.2 (ref 1.1–2.2)
ALBUMIN SERPL-MCNC: 4.4 G/DL (ref 3.4–5)
ALP BLD-CCNC: 139 U/L (ref 40–129)
ALT SERPL-CCNC: 25 U/L (ref 10–40)
ANION GAP SERPL CALCULATED.3IONS-SCNC: 16 MMOL/L (ref 3–16)
AST SERPL-CCNC: 33 U/L (ref 15–37)
BILIRUB SERPL-MCNC: 0.5 MG/DL (ref 0–1)
BUN BLDV-MCNC: 15 MG/DL (ref 7–20)
CALCIUM SERPL-MCNC: 9.6 MG/DL (ref 8.3–10.6)
CHLORIDE BLD-SCNC: 99 MMOL/L (ref 99–110)
CHOLESTEROL, FASTING: 164 MG/DL (ref 0–199)
CO2: 20 MMOL/L (ref 21–32)
CREAT SERPL-MCNC: 0.8 MG/DL (ref 0.6–1.2)
GFR AFRICAN AMERICAN: >60
GFR NON-AFRICAN AMERICAN: >60
GLOBULIN: 3.6 G/DL
GLUCOSE BLD-MCNC: 89 MG/DL (ref 70–99)
HCT VFR BLD CALC: 46.1 % (ref 36–48)
HDLC SERPL-MCNC: 55 MG/DL (ref 40–60)
HEMOGLOBIN: 15.7 G/DL (ref 12–16)
LDL CHOLESTEROL CALCULATED: 81 MG/DL
MCH RBC QN AUTO: 32.6 PG (ref 26–34)
MCHC RBC AUTO-ENTMCNC: 34 G/DL (ref 31–36)
MCV RBC AUTO: 96 FL (ref 80–100)
PDW BLD-RTO: 14.7 % (ref 12.4–15.4)
PLATELET # BLD: 233 K/UL (ref 135–450)
PMV BLD AUTO: 7.8 FL (ref 5–10.5)
POTASSIUM SERPL-SCNC: 4.8 MMOL/L (ref 3.5–5.1)
RBC # BLD: 4.8 M/UL (ref 4–5.2)
SODIUM BLD-SCNC: 135 MMOL/L (ref 136–145)
TOTAL PROTEIN: 8 G/DL (ref 6.4–8.2)
TRIGLYCERIDE, FASTING: 141 MG/DL (ref 0–150)
VLDLC SERPL CALC-MCNC: 28 MG/DL
WBC # BLD: 10.7 K/UL (ref 4–11)

## 2021-04-05 PROCEDURE — 2022F DILAT RTA XM EVC RTNOPTHY: CPT | Performed by: FAMILY MEDICINE

## 2021-04-05 PROCEDURE — 99214 OFFICE O/P EST MOD 30 MIN: CPT | Performed by: FAMILY MEDICINE

## 2021-04-05 PROCEDURE — G8419 CALC BMI OUT NRM PARAM NOF/U: HCPCS | Performed by: FAMILY MEDICINE

## 2021-04-05 PROCEDURE — 4004F PT TOBACCO SCREEN RCVD TLK: CPT | Performed by: FAMILY MEDICINE

## 2021-04-05 PROCEDURE — 3017F COLORECTAL CA SCREEN DOC REV: CPT | Performed by: FAMILY MEDICINE

## 2021-04-05 PROCEDURE — 3046F HEMOGLOBIN A1C LEVEL >9.0%: CPT | Performed by: FAMILY MEDICINE

## 2021-04-05 PROCEDURE — G8427 DOCREV CUR MEDS BY ELIG CLIN: HCPCS | Performed by: FAMILY MEDICINE

## 2021-04-05 ASSESSMENT — ENCOUNTER SYMPTOMS
NAUSEA: 0
ABDOMINAL PAIN: 0
DIARRHEA: 0
BLOOD IN STOOL: 0
ABDOMINAL DISTENTION: 0
CHEST TIGHTNESS: 0
SHORTNESS OF BREATH: 0
CONSTIPATION: 0
VOMITING: 0

## 2021-04-05 NOTE — PROGRESS NOTES
Subjective:      Patient ID: Good Art is a 56 y.o. female. Chief Complaint   Patient presents with    Follow-up     diabetes, lipids         Patient presents with: Follow-up: diabetes, lipids     Glucose  recently  The bp is better  cardiology increased the med to 100 mg daily    She is other wise well and no c/o    YOB: 1960    Date of Visit:  4/5/2021     -- Azithromycin -- Itching, Rash, Other (See Comments)                           and Swelling    --  Other reaction(s): Other (See Comments)             FEVER             FEVER   -- Baclofen -- Nausea And Vomiting, Itching and                            Rash   -- Nuts (Peanut Oil) -- Anaphylaxis   -- Peanut-Containing Drug Products -- Anaphylaxis   -- Sulfa Antibiotics -- Anaphylaxis and Rash   -- Adalimumab -- Other (See Comments)    --  \"immune system crashes\"   -- Avelox (Moxifloxacin Hcl In Nacl)    -- Bactrim    -- Belbuca (Buprenorphine Hcl) -- Itching and Other (See Comments)    --  Headaches, insomina   -- Infliximab -- Other (See Comments)    --  \"immune system crashes\"   -- Lisinopril -- Other (See Comments)    --  Cough   -- Zithromax (Azithromycin Dihydrate)    -- Adhesive Tape -- Rash    --  PAPER TAPE IS OK             PAPER TAPE IS OK   -- Moxifloxacin -- Rash, Other (See Comments) and                            Swelling    --  Other reaction(s): Other (See Comments)             FEVER             FEVER             Other reaction(s): Fever   -- Sulfamethoxazole-Trimethoprim -- Other (See Comments), Rash and                            Swelling    --  Other reaction(s):  Other (See Comments)             FEVER             FEVER    Current Outpatient Medications:  losartan (COZAAR) 100 MG tablet, Take 1 tablet by mouth daily, Disp: 90 tablet, Rfl: 3  cyclobenzaprine (FLEXERIL) 10 MG tablet, TAKE 1 TABLET TWICE DAILY AS NEEDED FOR MUSCLE SPASMS, Disp: 180 tablet, Rfl: 0  montelukast (SINGULAIR) 10 MG tablet, TAKE 1 TABLET EVERY DAY, Disp: 90 tablet, Rfl: 1  sulindac (CLINORIL) 200 MG tablet, TAKE 1 TABLET TWICE DAILY, Disp: 180 tablet, Rfl: 0  atorvastatin (LIPITOR) 20 MG tablet, TAKE 1 TABLET EVERY DAY, Disp: 90 tablet, Rfl: 1  pantoprazole (PROTONIX) 20 MG tablet, Take 1 tablet by mouth every morning (before breakfast), Disp: 30 tablet, Rfl: 5  metFORMIN (GLUCOPHAGE-XR) 500 MG extended release tablet, TAKE 1 TABLET TWICE DAILY (Patient taking differently: Three times daily), Disp: 180 tablet, Rfl: 2  albuterol sulfate HFA (PROAIR HFA) 108 (90 Base) MCG/ACT inhaler, Inhale 2 puffs into the lungs every 6 hours as needed for Wheezing, Disp: 3 Inhaler, Rfl: 0  ACCU-CHEK SMARTVIEW strip, TEST  ONE  TIME  DAILY, Disp: 100 strip, Rfl: 3  HYDROcodone-acetaminophen (NORCO) 5-325 MG per tablet, Take 1 tablet by mouth every 6 hours as needed for Pain.  7.5-325mg, Disp: , Rfl:   Blood Glucose Monitoring Suppl JOSSELYN, 1 each by Does not apply route daily, Disp: 1 Device, Rfl: 0  traZODone (DESYREL) 50 MG tablet, Take 150 mg by mouth every evening , Disp: , Rfl: 2  Cyanocobalamin (VITAMIN B 12 PO), Take by mouth daily, Disp: , Rfl:   ALPRAZolam (XANAX PO), Take by mouth, Disp: , Rfl:   venlafaxine (EFFEXOR XR) 150 MG extended release capsule, TAKE 1 CAPSULE EVERY DAY, Disp: 90 capsule, Rfl: 1  albuterol (PROVENTIL) (2.5 MG/3ML) 0.083% nebulizer solution, Use one vial every 6 hours in nebulizer as needed, Disp: 120 vial, Rfl: 2  ACCU-CHEK FASTCLIX LANCETS MISC, TEST ONE TIME DAILY, Disp: 102 each, Rfl: 3   Multiple Vitamins-Minerals (MULTIVITAMIN ADULT PO), Take 1 tablet by mouth, Disp: , Rfl:     No current facility-administered medications for this visit.       ---------------------------               04/05/21                      1457         ---------------------------   BP:          138/82         Site:    Left Upper Arm     Position:     Sitting        Cuff Size:  Medium Adult      Pulse:         88 Temp:   97.2 °F (36.2 °C)   TempSrc:    Temporal        Weight: 163 lb (73.9 kg)    Height: 5' 6.5\" (1.689 m)  ---------------------------  Body mass index is 25.91 kg/m². Wt Readings from Last 3 Encounters:  04/05/21 : 163 lb (73.9 kg)  03/24/21 : 165 lb 12.8 oz (75.2 kg)  02/24/21 : 165 lb (74.8 kg)    BP Readings from Last 3 Encounters:  04/05/21 : 138/82  03/24/21 : 132/80  02/24/21 : (!) 162/83            Review of Systems   Constitutional: Negative for appetite change, chills, fever and unexpected weight change. Respiratory: Negative for chest tightness and shortness of breath. Cardiovascular: Negative for chest pain, palpitations and leg swelling. Gastrointestinal: Negative for abdominal distention, abdominal pain, blood in stool, constipation, diarrhea, nausea and vomiting. Genitourinary: Negative for difficulty urinating. Neurological: Negative for dizziness and headaches. Objective:   Physical Exam  Constitutional:       General: She is not in acute distress. Appearance: Normal appearance. She is well-developed. She is not ill-appearing or diaphoretic. Eyes:      General: No scleral icterus. Cardiovascular:      Rate and Rhythm: Normal rate and regular rhythm. Heart sounds: Normal heart sounds. No murmur. No friction rub. No gallop. Comments:     Pulmonary:      Effort: Pulmonary effort is normal. No tachypnea, accessory muscle usage or respiratory distress. Breath sounds: Normal breath sounds. No decreased breath sounds, wheezing, rhonchi or rales. Abdominal:      General: Bowel sounds are normal. There is no distension or abdominal bruit. Palpations: Abdomen is soft. There is no mass. Tenderness: There is no abdominal tenderness. There is no guarding. Lymphadenopathy:      Cervical: No cervical adenopathy. Upper Body:      Right upper body: No supraclavicular adenopathy. Left upper body: No supraclavicular adenopathy. Skin:     General: Skin is warm and dry. Coloration: Skin is not pale. Nails: There is no clubbing. Neurological:      Mental Status: She is alert. Assessment:        Diagnosis Orders   1. Essential hypertension     2. Obstructive sleep apnea syndrome     3. Type 2 diabetes mellitus without complication, without long-term current use of insulin (HCC)         Dm is doing well and controlled  Lab Results   Component Value Date    LABA1C 6.7 11/24/2020     Lab Results   Component Value Date    .6 11/24/2020   dm controlled   Did receive the covid vaccine    She saw ortho 2 week ago for right hip injection ~2 week ago and did very well  She has seen cardiology 3/24 and ent 2/24 and has had egd dr Alecia Lua on 3/5 as well. Notes reviewed.  Dr Ammy Fitzgerald wants a recheck in 2 years    She was dilated on the egd   she had labs done this am and pending at this time   Discussed the shingle vaccine with her   She declined to do lung ca screen       Plan:      Continue the medicines  Do stay with the diet  See in 4 months         Tray Phillips MD

## 2021-04-06 LAB
ESTIMATED AVERAGE GLUCOSE: 125.5 MG/DL
HBA1C MFR BLD: 6 %

## 2021-04-07 ENCOUNTER — NURSE ONLY (OUTPATIENT)
Dept: PRIMARY CARE CLINIC | Age: 61
End: 2021-04-07
Payer: MEDICARE

## 2021-04-07 DIAGNOSIS — Z23 HIGH PRIORITY FOR COVID-19 VIRUS VACCINATION: Primary | ICD-10-CM

## 2021-04-09 ENCOUNTER — HOSPITAL ENCOUNTER (OUTPATIENT)
Dept: NON INVASIVE DIAGNOSTICS | Age: 61
Discharge: HOME | End: 2021-04-09
Payer: MEDICARE

## 2021-04-09 DIAGNOSIS — I35.1 NON-RHEUMATIC AORTIC REGURGITATION: ICD-10-CM

## 2021-04-09 DIAGNOSIS — I10 ESSENTIAL HYPERTENSION: ICD-10-CM

## 2021-04-09 DIAGNOSIS — I42.8 NICM (NONISCHEMIC CARDIOMYOPATHY) (HCC): ICD-10-CM

## 2021-04-09 LAB
LV EF: 48 %
LVEF MODALITY: NORMAL

## 2021-04-09 PROCEDURE — 93306 TTE W/DOPPLER COMPLETE: CPT

## 2021-04-12 RX ORDER — CARVEDILOL 6.25 MG/1
TABLET ORAL
Qty: 180 TABLET | Refills: 3 | Status: SHIPPED | OUTPATIENT
Start: 2021-04-12 | End: 2021-05-28 | Stop reason: SDUPTHER

## 2021-04-12 RX ORDER — CARVEDILOL 6.25 MG/1
6.25 TABLET ORAL 2 TIMES DAILY
Qty: 60 TABLET | Refills: 5 | Status: SHIPPED | OUTPATIENT
Start: 2021-04-12 | End: 2021-04-12

## 2021-05-17 RX ORDER — SULINDAC 200 MG/1
TABLET ORAL
Qty: 180 TABLET | Refills: 0 | Status: SHIPPED | OUTPATIENT
Start: 2021-05-17 | End: 2021-07-29

## 2021-05-25 ENCOUNTER — TELEPHONE (OUTPATIENT)
Dept: CARDIOLOGY CLINIC | Age: 61
End: 2021-05-25

## 2021-05-25 NOTE — TELEPHONE ENCOUNTER
Pt called stating her BP has been high since she started carvedilol. Today its 158/76. Pt c/o SOB, chest heaviness, and just not feeling right.     Please give her a call back at [phone]

## 2021-05-25 NOTE — TELEPHONE ENCOUNTER
Patient was last seen in office on 3/24/21. At that time, Losartan was increased to 100 mg BID and Coreg 6.25mg BID initiated. She reports that her BP has been steadily increasing. Today's reading was 158/76 and 175/93 while we were on the phone. She is scheduled for hip surgery on 6/22/21 and has not experienced any increased pain or anxiety. I asked that she increase Coreg to 12.5 mg BID, check BP at random times twice daily and call at the end of the week with readings. If no improvement, we can move her appointment up from 6/16 to an earlier date and have her bring BP cuff with her. Patient verbalized understanding and will call back on Friday morning.

## 2021-05-28 RX ORDER — CARVEDILOL 12.5 MG/1
12.5 TABLET ORAL 2 TIMES DAILY
Qty: 180 TABLET | Refills: 3 | Status: SHIPPED | OUTPATIENT
Start: 2021-05-28 | End: 2021-06-16

## 2021-05-28 NOTE — TELEPHONE ENCOUNTER
Cora Montenegro called in this afternoon stating that she took her BP at random times this week. The average BP reading for the week is 146/77. The lowest was 118/69 and the highest 164/79.

## 2021-05-28 NOTE — TELEPHONE ENCOUNTER
Dr. Bonita Pelayo     Patient seen in office 3/24/21. Hx cardiomyopathy, aortic regurgitation, HLD and hypertension. At that appointment her losartan was increased to 100 mg and following echo results Coreg 6.25 mg BID was initiated. Previously called with elevated BP readings and CATHERINE Robbins instructed to increase Coreg to 12.5 mg BID, monitor BP for a week and call with results. Please see return call and advise if any further recommendations.

## 2021-06-04 RX ORDER — CYCLOBENZAPRINE HCL 10 MG
TABLET ORAL
Qty: 180 TABLET | Refills: 0 | Status: SHIPPED | OUTPATIENT
Start: 2021-06-04 | End: 2021-08-12

## 2021-06-08 ENCOUNTER — OFFICE VISIT (OUTPATIENT)
Dept: FAMILY MEDICINE CLINIC | Age: 61
End: 2021-06-08
Payer: MEDICARE

## 2021-06-08 VITALS
WEIGHT: 164 LBS | HEART RATE: 84 BPM | BODY MASS INDEX: 25.74 KG/M2 | DIASTOLIC BLOOD PRESSURE: 82 MMHG | TEMPERATURE: 97.8 F | SYSTOLIC BLOOD PRESSURE: 134 MMHG | HEIGHT: 67 IN

## 2021-06-08 DIAGNOSIS — M25.551 HIP PAIN, CHRONIC, RIGHT: ICD-10-CM

## 2021-06-08 DIAGNOSIS — Z01.818 PREOP EXAMINATION: Primary | ICD-10-CM

## 2021-06-08 DIAGNOSIS — G47.33 OBSTRUCTIVE SLEEP APNEA SYNDROME: ICD-10-CM

## 2021-06-08 DIAGNOSIS — M06.9 RHEUMATOID ARTHRITIS, INVOLVING UNSPECIFIED SITE, UNSPECIFIED WHETHER RHEUMATOID FACTOR PRESENT (HCC): ICD-10-CM

## 2021-06-08 DIAGNOSIS — I10 ESSENTIAL HYPERTENSION: ICD-10-CM

## 2021-06-08 DIAGNOSIS — G89.29 HIP PAIN, CHRONIC, RIGHT: ICD-10-CM

## 2021-06-08 DIAGNOSIS — E11.9 TYPE 2 DIABETES MELLITUS WITHOUT COMPLICATION, WITHOUT LONG-TERM CURRENT USE OF INSULIN (HCC): ICD-10-CM

## 2021-06-08 PROCEDURE — G8419 CALC BMI OUT NRM PARAM NOF/U: HCPCS | Performed by: FAMILY MEDICINE

## 2021-06-08 PROCEDURE — 3044F HG A1C LEVEL LT 7.0%: CPT | Performed by: FAMILY MEDICINE

## 2021-06-08 PROCEDURE — 4004F PT TOBACCO SCREEN RCVD TLK: CPT | Performed by: FAMILY MEDICINE

## 2021-06-08 PROCEDURE — 3017F COLORECTAL CA SCREEN DOC REV: CPT | Performed by: FAMILY MEDICINE

## 2021-06-08 PROCEDURE — 99213 OFFICE O/P EST LOW 20 MIN: CPT | Performed by: FAMILY MEDICINE

## 2021-06-08 PROCEDURE — G8427 DOCREV CUR MEDS BY ELIG CLIN: HCPCS | Performed by: FAMILY MEDICINE

## 2021-06-08 PROCEDURE — 2022F DILAT RTA XM EVC RTNOPTHY: CPT | Performed by: FAMILY MEDICINE

## 2021-06-08 ASSESSMENT — ENCOUNTER SYMPTOMS
SORE THROAT: 0
BLOOD IN STOOL: 0
ABDOMINAL DISTENTION: 0
TROUBLE SWALLOWING: 0
CONSTIPATION: 0
SHORTNESS OF BREATH: 0
NAUSEA: 0
VOICE CHANGE: 0
VOMITING: 0
EYE PAIN: 0
CHEST TIGHTNESS: 0
DIARRHEA: 0
COUGH: 0
ABDOMINAL PAIN: 0

## 2021-06-08 NOTE — PROGRESS NOTES
Subjective:      Patient ID: Dulce Laboy is a 56 y.o. female. Chief Complaint   Patient presents with    Pre-op Exam     Right Hip Surgery on 6- by Dr. Kika Glass at [facility]. fax [phone], [phone]        Patient presents with:  Pre-op Exam: Right Hip Surgery on 6- by Dr. Kika Glass at [facility]. fax [phone], [phone]    She is here for the above  She has had hip pain for close to 2 years  She is well        height is 5' 6.5\" (1.689 m) and weight is 164 lb (74.4 kg). Her oral temperature is 97.8 °F (36.6 °C). Her blood pressure is 134/82 and her pulse is 84. Allergies:     -- Azithromycin -- Itching, Rash, Other (See Comments)                           and Swelling    --  Other reaction(s): Other (See Comments)             FEVER             FEVER   -- Baclofen -- Nausea And Vomiting, Itching and                            Rash   -- Nuts (Peanut Oil) -- Anaphylaxis   -- Peanut-Containing Drug Products -- Anaphylaxis   -- Sulfa Antibiotics -- Anaphylaxis and Rash   -- Adalimumab -- Other (See Comments)    --  \"immune system crashes\"   -- Avelox (Moxifloxacin Hcl In Nacl)    -- Bactrim    -- Belbuca (Buprenorphine Hcl) -- Itching and Other (See Comments)    --  Headaches, insomina   -- Infliximab -- Other (See Comments)    --  \"immune system crashes\"   -- Lisinopril -- Other (See Comments)    --  Cough   -- Zithromax (Azithromycin Dihydrate)    -- Adhesive Tape -- Rash    --  PAPER TAPE IS OK             PAPER TAPE IS OK   -- Moxifloxacin -- Rash, Other (See Comments) and                            Swelling    --  Other reaction(s): Other (See Comments)             FEVER             FEVER             Other reaction(s): Fever   -- Sulfamethoxazole-Trimethoprim -- Other (See Comments), Rash and                            Swelling    --  Other reaction(s): Other (See Comments)             FEVER             FEVER      Current tobacco user. No ETOH use.     Immunization History  Administered            Date(s) Administered    COVID-19, Pfizer, PF, 30mcg/0.3mL                          03/12/2021 04/02/2021      Influenza             09/07/2011 09/13/2012 09/09/2013      Influenza Virus Vaccine                          09/26/2014  10/08/2015      Influenza Whole       09/15/2010      Influenza, Quadv, IM, PF (6 mo and older Fluzone, Flulaval, Fluarix, and 3 yrs and older Afluria)                          09/07/2016  09/19/2017  09/27/2018                            10/04/2019  09/10/2020      Pneumococcal Polysaccharide ([id number])                          01/01/1996 11/06/2006 08/21/2013      Td, unspecified formulation                          08/15/1996      Tdap (Boostrix, Adacel)                          08/15/1996  06/06/2011        Current Outpatient Medications:     cyclobenzaprine (FLEXERIL) 10 MG tablet, TAKE 1 TABLET TWICE DAILY AS NEEDED FOR MUSCLE SPASMS,     carvedilol (COREG) 12.5 MG tablet, Take 1 tablet by mouth 2 times daily    sulindac (CLINORIL) 200 MG tablet, TAKE 1 TABLET TWICE DAILY    losartan (COZAAR) 100 MG tablet, Take 1 tablet by mouth daily    montelukast (SINGULAIR) 10 MG tablet, TAKE 1 TABLET EVERY DAY    atorvastatin (LIPITOR) 20 MG tablet, TAKE 1 TABLET EVERY DAY    pantoprazole (PROTONIX) 20 MG tablet, Take 1 tablet by mouth every morning (before breakfast)    metFORMIN (GLUCOPHAGE-XR) 500 MG extended release tablet, TAKE 1 TABLET TWICE DAILY    albuterol sulfate HFA (PROAIR HFA) 108 (90 Base) MCG/ACT inhaler, Inhale 2 puffs into the lungs every 6 hours as needed for Wheezing,     ACCU-CHEK SMARTVIEW strip, TEST  ONE  TIME  DAILY    HYDROcodone-acetaminophen (NORCO) 5-325 MG per tablet, Take 1 tablet by mouth every 6 hours as needed for Pain.  7.5-325mg    Blood Glucose Monitoring Suppl JOSSELYN, 1 each by Does not apply route daily    traZODone (DESYREL) 50 MG tablet, Take 150 mg by mouth every evening    ALPRAZolam (XANAX PO), Take by mouth    venlafaxine (EFFEXOR XR) 150 MG extended release capsule, TAKE 1 CAPSULE EVERY DAY    albuterol (PROVENTIL) (2.5 MG/3ML) 0.083% nebulizer solution, Use one vial every 6 hours in nebulizer as needed,     ACCU-CHEK FASTCLIX LANCETS MISC, TEST ONE TIME DAILY    Cyanocobalamin (VITAMIN B 12 PO), Take by mouth daily (Patient not taking: Reported on 6/8/2021),    Multiple Vitamins-Minerals (MULTIVITAMIN ADULT PO), Take 1 tablet by mouth      Past Surgical History:  1978: APPENDECTOMY      Comment:  incidental  2002: BREAST SURGERY      Comment:  breast tissue removed from right axillary   10/2016: CATARACT REMOVAL; Bilateral  7.10.09: COLONOSCOPY      Comment:  normal; Dr Johana Bautista  08/23/2018: COLONOSCOPY      Comment:  ok dr Patricia Sharpe repeat in 10 years  1979: CYST REMOVAL      Comment:  cyst on left ovary -removal of left ovary  1978: ECTOPIC PREGNANCY SURGERY      Comment:  removal of left tube   9/1998: FOOT SURGERY      Comment:  B/L release of the fascia   1986: HYSTERECTOMY      Comment:  removal of uterus  1979,1990: SALPINGO-OOPHORECTOMY      Comment:  left ovary removed following cyst removal, right tube                and ovary removed due to adhesion  1994: SINUS SURGERY      Comment:  polyps in sinuses  11/2016: SPINE SURGERY      Comment:  lumbar   08/23/2018: UPPER GASTROINTESTINAL ENDOSCOPY      Comment:  dr Patricia Sharpe, recheck in 2 years   3/2003: WRIST SURGERY      Comment:  repair of left wrist torn ligament-tenosynovitis release               right     No problems with anesthesia     Past Medical History:  No date:  Anxiety  No date: Arrhythmia  No date: Asthma  No date: Chronic back pain  No date: Depression  No date: Diabetes mellitus type II  No date: Dyshidrotic dermatitis  2/28/2018: Essential hypertension  No date: Hyperlipidemia  No date: Kidney stone  No date: Liver disease  No date: Lump or mass in breast  No date: Panic disorder  No date: Personal history of renal calculi      Comment: kidney stones [id number]  2007: Pneumonia  No date: RA (rheumatoid arthritis) ([facility])      Comment:  chronic  No date: Scoliosis  No date: Unspecified sleep apnea            Review of Systems   Constitutional: Negative for appetite change, chills, fever and unexpected weight change. HENT: Negative for sore throat, trouble swallowing and voice change. Full dentures   Eyes: Negative for pain and visual disturbance. Respiratory: Negative for cough, chest tightness and shortness of breath. No hemoptysis    Cardiovascular: Negative for chest pain and leg swelling. Occ palpitation. Hx of nonischemic cardiomyopathy. Sedentary. Gastrointestinal: Negative for abdominal distention, abdominal pain, blood in stool, constipation, diarrhea, nausea and vomiting. No gerd no dysphagia    Genitourinary: Negative for difficulty urinating, dysuria and hematuria. Skin: Negative for rash. Neurological: Negative for dizziness, seizures, syncope and headaches. Hematological: Negative for adenopathy. Does not bruise/bleed easily. No hx of blood clot       Objective:   Physical Exam  Constitutional:       General: She is not in acute distress. Appearance: Normal appearance. She is well-developed. She is not ill-appearing or diaphoretic. HENT:      Head: Normocephalic and atraumatic. Right Ear: Tympanic membrane and ear canal normal.      Left Ear: Tympanic membrane and ear canal normal.      Nose: Nose normal.      Mouth/Throat:      Mouth: No oral lesions. Pharynx: Uvula midline. Neck:      Thyroid: No thyroid mass or thyromegaly. Cardiovascular:      Rate and Rhythm: Normal rate and regular rhythm. Pulses:           Dorsalis pedis pulses are 2+ on the right side and 2+ on the left side. Posterior tibial pulses are 2+ on the right side and 2+ on the left side. Heart sounds: Normal heart sounds. No murmur heard. No friction rub. No gallop. Comments:   No edema legs  Pulmonary:      Effort: Pulmonary effort is normal. No tachypnea, accessory muscle usage or respiratory distress. Breath sounds: Normal breath sounds. No decreased breath sounds, wheezing, rhonchi or rales. Abdominal:      General: Bowel sounds are normal. There is no distension or abdominal bruit. Palpations: Abdomen is soft. There is no hepatomegaly, splenomegaly, mass or pulsatile mass. Tenderness: There is no abdominal tenderness. There is no guarding. Comments: She wanted to know about a lesion on the abdomen. There \"forever\" ~30 years no sx   Musculoskeletal:      Cervical back: Neck supple. Lymphadenopathy:      Head:      Right side of head: No submental or submandibular adenopathy. Left side of head: No submental or submandibular adenopathy. Cervical: No cervical adenopathy. Upper Body:      Right upper body: No supraclavicular adenopathy. Left upper body: No supraclavicular adenopathy. Skin:     General: Skin is warm and dry. Coloration: Skin is not pale. Nails: There is no clubbing. Neurological:      Mental Status: She is alert. Assessment:        Diagnosis Orders   1. Preop examination  Hemoglobin A1C   2. Hip pain, chronic, right     3. Essential hypertension     4. Type 2 diabetes mellitus without complication, without long-term current use of insulin ([facility])  Hemoglobin A1C   5. Obstructive sleep apnea syndrome     6. Rheumatoid arthritis, involving unspecified site, unspecified whether rheumatoid factor present ([facility]         Diabetes control is good  Lab Results   Component Value Date    LABA1C 6.0 04/05/2021     Lab Results   Component Value Date    .5 04/05/2021     She indicates she has preop labs scheduled  Echo done 4/9.  Mild depressed cardiac function noted       Plan:      Do get the ok from dr John Mcdaniel for the surgery  No sulindac no vitamins or supplements for a week before the surgery  On the morning of the surgery take the pantoprazole, carvedilol, and effexor with just enough water to get the pills down as soon as you get out of bed   No metformin the night before the surgery  Take the cpap to hospital with you        Marilyn Henriquez MD

## 2021-06-16 ENCOUNTER — OFFICE VISIT (OUTPATIENT)
Dept: CARDIOLOGY CLINIC | Age: 61
End: 2021-06-16
Payer: MEDICARE

## 2021-06-16 VITALS
DIASTOLIC BLOOD PRESSURE: 100 MMHG | HEIGHT: 66 IN | OXYGEN SATURATION: 97 % | WEIGHT: 160 LBS | HEART RATE: 102 BPM | SYSTOLIC BLOOD PRESSURE: 174 MMHG | BODY MASS INDEX: 25.71 KG/M2

## 2021-06-16 DIAGNOSIS — E11.9 TYPE 2 DIABETES MELLITUS WITHOUT COMPLICATION, WITHOUT LONG-TERM CURRENT USE OF INSULIN (HCC): ICD-10-CM

## 2021-06-16 DIAGNOSIS — I10 ESSENTIAL HYPERTENSION: ICD-10-CM

## 2021-06-16 DIAGNOSIS — I35.1 NON-RHEUMATIC AORTIC REGURGITATION: ICD-10-CM

## 2021-06-16 DIAGNOSIS — Z72.0 NICOTINE ABUSE: ICD-10-CM

## 2021-06-16 DIAGNOSIS — I42.8 NICM (NONISCHEMIC CARDIOMYOPATHY) (HCC): Primary | ICD-10-CM

## 2021-06-16 DIAGNOSIS — E78.2 MIXED HYPERLIPIDEMIA: ICD-10-CM

## 2021-06-16 DIAGNOSIS — Z01.810 PREOP CARDIOVASCULAR EXAM: ICD-10-CM

## 2021-06-16 PROCEDURE — G8419 CALC BMI OUT NRM PARAM NOF/U: HCPCS | Performed by: INTERNAL MEDICINE

## 2021-06-16 PROCEDURE — 4004F PT TOBACCO SCREEN RCVD TLK: CPT | Performed by: INTERNAL MEDICINE

## 2021-06-16 PROCEDURE — 2022F DILAT RTA XM EVC RTNOPTHY: CPT | Performed by: INTERNAL MEDICINE

## 2021-06-16 PROCEDURE — G8428 CUR MEDS NOT DOCUMENT: HCPCS | Performed by: INTERNAL MEDICINE

## 2021-06-16 PROCEDURE — 3044F HG A1C LEVEL LT 7.0%: CPT | Performed by: INTERNAL MEDICINE

## 2021-06-16 PROCEDURE — 3017F COLORECTAL CA SCREEN DOC REV: CPT | Performed by: INTERNAL MEDICINE

## 2021-06-16 PROCEDURE — 99214 OFFICE O/P EST MOD 30 MIN: CPT | Performed by: INTERNAL MEDICINE

## 2021-06-16 RX ORDER — CARVEDILOL 25 MG/1
25 TABLET ORAL 2 TIMES DAILY
Qty: 180 TABLET | Refills: 2 | Status: SHIPPED | OUTPATIENT
Start: 2021-06-16 | End: 2022-01-19

## 2021-06-16 NOTE — PATIENT INSTRUCTIONS
infected teeth and gums to the heart valves. When should you call for help? Call 911 anytime you think you may need emergency care. For example, call if:    · You have signs of acute aortic valve regurgitation such as:  ? Severe shortness of breath. ? A rapid heart rate. ? Lightheadedness.     · You have symptoms of sudden heart failure such as:  ? Severe trouble breathing. ? Coughing up pink, foamy mucus. ? A new irregular or rapid heartbeat. Call your doctor now or seek immediate medical care if:    · You have new symptoms or your symptoms get worse.     · You have new or increased shortness of breath.     · You are dizzy or lightheaded, or you feel like you may faint.     · You have sudden weight gain, such as more than 2 to 3 pounds in a day or 5 pounds in a week. (Your doctor may suggest a different range of weight gain.)     · You have new or increased swelling in your legs, ankles, or feet.     · You are suddenly so tired or weak that you cannot do your usual activities. Watch closely for changes in your health, and be sure to contact your doctor if you have any problems. Where can you learn more? Go to [email or web address] and sign in to your [email or web address] account. Enter B237 in the [email or web address] box to learn more about \"Aortic Valve Regurgitation: Care Instructions. \"     If you do not have an account, please click on the \"Sign Up Now\" link. Current as of: August 31, 2020               Content Version: 12.9  © 2006-2021 [employer]. Care instructions adapted under license by [facility] ([facility]). If you have questions about a medical condition or this instruction, always ask your healthcare professional. [place] [address] any warranty or liability for your use of this information.

## 2021-06-16 NOTE — PROGRESS NOTES
[facility]      Cardiology Consult    Amador Meghan  1960 June 16, 2021    Referring Physician: Dr. Em Koroma  Reason for Referral: cardiomyopathy    CC: \"My blood pressure is still high. \"     HPI:  The patient is 56 y.o. female with a past medical history significant for cardiomyopathy, nicotine addiction, and Type II DM presents for chronic management of a cardiomyopathy and aortic regurgitation. She had an echocardiogram ordered 1/2017 for a newly heard murmur. The echo report stated an EF of 40% with moderate aortic regurgitation. Her repeat echocardiogram 2/13/18 showed a recovered EF at 60% but her most recent echo 4/2021 showed a mildly reduced EF at 45-50%. Today, she states she is doing [place]. \" She denies any chest pains or worsening shortness of breath. Her main complaint is chronic fatigue and orthopaedic pain. She monitors her blood pressure at home and is still typically >140. She reports compliance with her medications and tolerating. Patient denies exertional chest pain/pressure, dyspnea at rest, worsening BUSTAMANTE, PND, orthopnea, palpitations, lightheadedness, weight changes, changes in LE edema, and syncope. She states she is scheduled for hip arthroplasty next week and requesting preoperative clearance.      Past Medical History:   Diagnosis Date    Anxiety     Arrhythmia     Asthma     Chronic back pain     Depression     Diabetes mellitus type II     Dyshidrotic dermatitis     Elevated blood pressure reading without diagnosis of hypertension     Essential hypertension 2/28/2018    Hyperlipidemia     Kidney stone     Liver disease     Lump or mass in breast     Orthostatic hypotension     Osteoarthritis     rheumatoid arthritis    Panic disorder     Personal history of renal calculi     kidney stones [id number]    Pneumonia 2007    RA [facility]oid [facility]) ([facility] [address]     chronic    Scoliosis     Substance abuse ([facility] [address]     Unspecified sleep apnea      Past Surgical History:   Procedure Laterality Date    APPENDECTOMY  1978    incidental    BREAST SURGERY  2002    breast tissue removed from right axillary     CATARACT REMOVAL Bilateral 10/2016    COLONOSCOPY  7.10.09    normal; Dr Jose Camejo  08/23/2018    ok dr Christina Gama repeat in 8 years    CYST REMOVAL  1979    cyst on left ovary -removal of left ovary    ECTOPIC PREGNANCY SURGERY  1978    removal of left tube     FOOT SURGERY  9/1998    B/L release of the fascia     HYSTERECTOMY  1986    removal of uterus    SALPINGO-OOPHORECTOMY  1538,0402    left ovary removed following cyst removal, right tube and ovary removed due to adhesion    SINUS SURGERY  1994    polyps in sinuses    SPINE SURGERY  11/2016    lumbar     UPPER GASTROINTESTINAL ENDOSCOPY  08/23/2018    dr Christina Gama, recheck in 2 years     WRIST SURGERY  3/2003    repair of left wrist torn ligament-tenosynovitis release right      Family History   Problem Relation Age of Onset    Cancer Mother         breast cancer in her 48d   Facundo Reaper Diabetes Mother     High Blood Pressure Mother     Stroke Maternal Grandmother     Cancer Paternal Grandfather         bladder cancer    Diabetes Paternal Aunt     Diabetes Paternal Grandmother      Social History     Tobacco Use    Smoking status: Current Every Day Smoker     Packs/day: 0.75     Years: 30.00     Pack years: 22.50     Start date: 3/14/1975    Smokeless tobacco: Never Used    Tobacco comment: 1 cig per day, vaporing instead   Vaping Use    Vaping Use: Never assessed   Substance Use Topics    Alcohol use: No    Drug use: No     Allergies   Allergen Reactions    Azithromycin Itching, Rash, Other (See Comments) and Swelling     Other reaction(s):  Other (See Comments)  FEVER  FEVER    Baclofen Nausea And Vomiting, Itching and Rash    Nuts [Peanut Oil] Anaphylaxis    Peanut-Containing Drug Products Anaphylaxis    Sulfa Antibiotics Anaphylaxis and Rash    Adalimumab Other (See Comments) \"immune system crashes\"    Avelox [Moxifloxacin Hcl In Nacl]     Bactrim     Belbuca [Buprenorphine Hcl] Itching and Other (See Comments)     Headaches, insomina    Infliximab Other (See Comments)     \"immune system crashes\"    Lisinopril Other (See Comments)     Cough    Zithromax [Azithromycin Dihydrate]     Adhesive Tape Rash     PAPER TAPE IS OK  PAPER TAPE IS OK    Moxifloxacin Rash, Other (See Comments) and Swelling     Other reaction(s): Other (See Comments)  FEVER  FEVER  Other reaction(s): Fever    Sulfamethoxazole-Trimethoprim Other (See Comments), Rash and Swelling     Other reaction(s): Other (See Comments)  FEVER  FEVER     Current Outpatient Medications   Medication Sig Dispense Refill    cyclobenzaprine (FLEXERIL) 10 MG tablet TAKE 1 TABLET TWICE DAILY AS NEEDED FOR MUSCLE SPASMS 180 tablet 0    carvedilol (COREG) 12.5 MG tablet Take 1 tablet by mouth 2 times daily 180 tablet 3    sulindac (CLINORIL) 200 MG tablet TAKE 1 TABLET TWICE DAILY 180 tablet 0    losartan (COZAAR) 100 MG tablet Take 1 tablet by mouth daily 90 tablet 3    montelukast (SINGULAIR) 10 MG tablet TAKE 1 TABLET EVERY DAY 90 tablet 1    atorvastatin (LIPITOR) 20 MG tablet TAKE 1 TABLET EVERY DAY 90 tablet 1    pantoprazole (PROTONIX) 20 MG tablet Take 1 tablet by mouth every morning (before breakfast) 30 tablet 5    metFORMIN (GLUCOPHAGE-XR) 500 MG extended release tablet TAKE 1 TABLET TWICE DAILY 180 tablet 2    albuterol sulfate HFA (PROAIR HFA) 108 (90 Base) MCG/ACT inhaler Inhale 2 puffs into the lungs every 6 hours as needed for Wheezing 3 Inhaler 0    ACCU-CHEK SMARTVIEW strip TEST  ONE  TIME  DAILY 100 strip 3    HYDROcodone-acetaminophen (NORCO) 5-325 MG per tablet Take 1 tablet by mouth every 6 hours as needed for Pain.  7.5-325mg      Blood Glucose Monitoring Suppl JOSSELYN 1 each by Does not apply route daily 1 Device 0    traZODone (DESYREL) 50 MG tablet Take 150 mg by mouth every evening   2    ALPRAZolam (XANAX PO) Take by mouth      venlafaxine (EFFEXOR XR) 150 MG extended release capsule TAKE 1 CAPSULE EVERY DAY 90 capsule 1    albuterol (PROVENTIL) (2.5 MG/3ML) 0.083% nebulizer solution Use one vial every 6 hours in nebulizer as needed 120 vial 2    ACCU-CHEK FASTCLIX LANCETS MISC TEST ONE TIME DAILY 102 each 3    Multiple Vitamins-Minerals (MULTIVITAMIN ADULT PO) Take 1 tablet by mouth       No current facility-administered medications for this visit. Review of Systems:  · Constitutional: no unanticipated weight loss. There's been no change in energy level, sleep pattern, or activity level. No fevers, chills. · Eyes: No visual changes or diplopia. No scleral icterus. · ENT: No Headaches, hearing loss or vertigo. No mouth sores or sore throat. · Cardiovascular: as reviewed in HPI  · Respiratory: No cough or wheezing, no sputum production. No hematemesis. · Gastrointestinal: No abdominal pain, appetite loss, blood in stools. No change in bowel or bladder habits. · Genitourinary: No dysuria, trouble voiding, or hematuria. · Musculoskeletal:  No gait disturbance, no joint complaints. · Integumentary: No rash or pruritis. · Neurological: No headache, diplopia, change in muscle strength, numbness or tingling. · Psychiatric: No anxiety or depression. · Endocrine: No temperature intolerance. No excessive thirst, fluid intake, or urination. No tremor. · Hematologic/Lymphatic: No abnormal bruising or bleeding, blood clots or swollen lymph nodes. · Allergic/Immunologic: No nasal congestion or hives. Physical Exam:   BP (!) 174/100   Pulse 102   Ht 5' 6\" (1.676 m)   Wt 160 lb (72.6 kg)   SpO2 97%   BMI 25.82 kg/m²   Wt Readings from Last 3 Encounters:   06/16/21 160 lb (72.6 kg)   06/08/21 164 lb (74.4 kg)   04/05/21 163 lb (73.9 kg)     Constitutional: She is oriented to person, place, and time. She appears well-developed and well-nourished. In no acute distress.    Head: Normocephalic and atraumatic. Pupils equal and round. Neck: Neck supple. No JVP or carotid bruit appreciated. No mass and no thyromegaly present. No lymphadenopathy present. Cardiovascular: Tachycardic but regular. Normal heart sounds. Exam reveals no gallop and no friction rub. No murmur heard. Pulmonary/Chest: Effort normal and breath sounds normal. No respiratory distress. She no wheezing. No  rhonchi or rales. Abdominal: Soft, non-tender. Bowel sounds are normal. She exhibits no organomegaly, mass or bruit. Extremities: No edema, cyanosis, or clubbing. Pulses are 2+ radial/carotid bilaterally. Neurological: No gross cranial nerve deficit. Coordination normal.   Skin: Skin is warm and dry. There is no rash or diaphoresis. Psychiatric: She has a normal mood and affect. Her speech is normal and behavior is normal.     Lab Review:   FLP:    Lab Results   Component Value Date    TRIG 167 07/21/2020    HDL 55 04/05/2021    LDLCALC 81 04/05/2021    LABVLDL 28 04/05/2021     BUN/Creatinine:    Lab Results   Component Value Date    BUN 15 04/05/2021    CREATININE 0.8 04/05/2021     EKG Interpretation: 9/23/16 NSR. Minimal voltage criteria for LVH.   11/28/18 Sinus tachycardia. Voltage criteria for LVH. Nonspecific ST-T abnormality. 3/24/21 Sinus tachycardia. Cannot rule out anterior infarct. Nonspecific ST-T abnormality. Image Review:   GXT myoview 8/2013  Normal study except hypertensive response to exercise. Echo 1/27/17 personally reviewed  Overall heart function appears at the lower limits of normal to mildly reduced with an EF 50%. The left atrium is normal in size. There is mild aortic regurgitation. The right ventricle is normal in size and function. Echo 2/13/18 personally reviewed  Normal left ventricle size and systolic function with an estimated ejection fraction of 60-65%. No regional wall motion abnormalities are seen.   Mild concentric left ventricular hypertrophy is noted.  Diastolic filling parameters suggests grade I diastolic dysfunction. Mild mitral regurgitation is present. Mild to moderate aortic regurgitation is present. There is mild tricuspid regurgitation with RVSP estimated at 26 mmHg.     Echo 4/9/21   Overall left ventricular systolic function is mildly depressed. Ejection fraction is visually estimated to be 45-50%. E/e'= 10.7. There is mild diffuse hypokinesis. There is reversal of E/A inflow velocities across the mitral valve. Indeterminate diastolic function. Aortic valve appears sclerotic but opens adequately. Moderate aortic regurgitation. Assessment/Plan:   1) Cardiomyopathy. EF 45-50%. Patient appears euvolemic and compensated. Lisinopril discontinued due to cough. Continue losartan 100 mg daily and will increase Coreg 25 mg BID for better blood pressure control. Will continue to titrate medications as tolerated. 2) Aortic regurgitation/sclerosis. Moderate per report (4/9/21). Will continue to monitor clinically and with routine imaging     3) Essential hypertension. Uncontrolled. Goal BP <130/80 with DM. Continue losartan 100 mg daily and increase Coreg to 25 mg BID. 4) Hyperlipidemia. Continue statin therapy. 5) Nicotine Addiction. Encouraged smoking cessation but patient is in the contemplative stage. 6) Diabetes Type II. Management per PCP, will check updated Hgb A1c.     7) Pre-operative risk assessment. Patient is low to intermediate cardiac risk based on RCRI score of one (CHF). Patient's risk should not preclude her from proceeding with surgery. Suggest continuation of B-blocker and statin in the mick-operative period. Follow up in 4-6 weeks     Thank you very much for allowing me to participate in the care of your patient. Please do not hesitate to contact me if you have any questions. Sincerely,  Mati Dural.  Keane Phalen, [address] [facility], [address] [place] [address]  Ph: [phone]  Fax: [phone]      This note was scribed in the presence of Dr Elaine Kramer MD by Kaden Medina RN. Physician Attestation: The scribes documentation has been prepared under my direction and personally reviewed by me in its entirety. I confirm that the note above accurately reflects all work, treatment, procedures, and medical decision making performed by me. All portions of the note including but not limited to the chief complaint, history of present illness, physical exam, assessment and plan/medical decision making were personally reviewed, edited, and updated on the day of the visit.

## 2021-06-21 RX ORDER — ATORVASTATIN CALCIUM 20 MG/1
TABLET, FILM COATED ORAL
Qty: 90 TABLET | Refills: 1 | Status: SHIPPED | OUTPATIENT
Start: 2021-06-21 | End: 2021-12-13

## 2021-06-22 RX ORDER — BLOOD SUGAR DIAGNOSTIC
STRIP MISCELLANEOUS
Qty: 100 STRIP | Refills: 3 | Status: SHIPPED | OUTPATIENT
Start: 2021-06-22 | End: 2022-04-06

## 2021-06-23 ENCOUNTER — TELEPHONE (OUTPATIENT)
Dept: CARDIOLOGY CLINIC | Age: 61
End: 2021-06-23

## 2021-07-29 RX ORDER — SULINDAC 200 MG/1
TABLET ORAL
Qty: 180 TABLET | Refills: 0 | Status: SHIPPED | OUTPATIENT
Start: 2021-07-29 | End: 2021-08-12

## 2021-08-02 ENCOUNTER — OFFICE VISIT (OUTPATIENT)
Dept: CARDIOLOGY CLINIC | Age: 61
End: 2021-08-02
Payer: MEDICARE

## 2021-08-02 VITALS
HEIGHT: 67 IN | OXYGEN SATURATION: 96 % | WEIGHT: 159.6 LBS | DIASTOLIC BLOOD PRESSURE: 70 MMHG | BODY MASS INDEX: 25.05 KG/M2 | SYSTOLIC BLOOD PRESSURE: 120 MMHG | HEART RATE: 86 BPM

## 2021-08-02 DIAGNOSIS — I10 ESSENTIAL HYPERTENSION: ICD-10-CM

## 2021-08-02 DIAGNOSIS — I35.1 NON-RHEUMATIC AORTIC REGURGITATION: ICD-10-CM

## 2021-08-02 DIAGNOSIS — I42.8 NICM (NONISCHEMIC CARDIOMYOPATHY) (HCC): Primary | ICD-10-CM

## 2021-08-02 DIAGNOSIS — E11.9 TYPE 2 DIABETES MELLITUS WITHOUT COMPLICATION, WITHOUT LONG-TERM CURRENT USE OF INSULIN (HCC): ICD-10-CM

## 2021-08-02 DIAGNOSIS — E78.2 MIXED HYPERLIPIDEMIA: ICD-10-CM

## 2021-08-02 DIAGNOSIS — F17.210 CIGARETTE NICOTINE DEPENDENCE WITHOUT COMPLICATION: ICD-10-CM

## 2021-08-02 PROCEDURE — 3017F COLORECTAL CA SCREEN DOC REV: CPT | Performed by: INTERNAL MEDICINE

## 2021-08-02 PROCEDURE — G8427 DOCREV CUR MEDS BY ELIG CLIN: HCPCS | Performed by: INTERNAL MEDICINE

## 2021-08-02 PROCEDURE — 4004F PT TOBACCO SCREEN RCVD TLK: CPT | Performed by: INTERNAL MEDICINE

## 2021-08-02 PROCEDURE — 2022F DILAT RTA XM EVC RTNOPTHY: CPT | Performed by: INTERNAL MEDICINE

## 2021-08-02 PROCEDURE — 99214 OFFICE O/P EST MOD 30 MIN: CPT | Performed by: INTERNAL MEDICINE

## 2021-08-02 PROCEDURE — G8419 CALC BMI OUT NRM PARAM NOF/U: HCPCS | Performed by: INTERNAL MEDICINE

## 2021-08-02 PROCEDURE — 3044F HG A1C LEVEL LT 7.0%: CPT | Performed by: INTERNAL MEDICINE

## 2021-08-02 RX ORDER — HYDROCHLOROTHIAZIDE 25 MG/1
25 TABLET ORAL EVERY MORNING
Qty: 90 TABLET | Refills: 2 | Status: SHIPPED | OUTPATIENT
Start: 2021-08-02 | End: 2022-01-05

## 2021-08-02 NOTE — PATIENT INSTRUCTIONS
Patient Education        Learning About ARBs  Introduction     ARBs (angiotensin II receptor blockers) block a hormone that makes blood vessels narrow. As a result, the blood vessels relax and widen. This lowers blood pressure. ARBs also put more water and salt into the urine. This also lowers blood pressure. ARBs can treat:  · High blood pressure. · Coronary artery disease. · Heart failure. They also may be used to help your kidneys when you have diabetes. Examples  · candesartan (Atacand)  · irbesartan (Avapro)  · losartan (Cozaar)  · olmesartan (Benicar)  · valsartan (Diovan)  This is not a complete list of all ARBs. Possible side effects  Side effects may include:  · Low blood pressure. You may feel dizzy and weak. · High potassium levels. You may have other side effects or reactions not listed here. Check the information that comes with your medicine. What to know about taking this medicine  · ARBs may be used if you had a cough when you tried to take an ACE inhibitor. ARBs are less likely to cause a cough. · You may need regular blood tests. · Take your medicines exactly as prescribed. Call your doctor if you think you are having a problem with your medicine. · Tell your doctor or pharmacist all the medicines you take. This includes over-the-counter medicines, vitamins, herbal products, and supplements. Taking some medicines together can cause problems. · You should not take ARBs if you are pregnant or planning to become pregnant. Where can you learn more? Go to https://[employer]. org and sign in to your [employer] account. Enter K212 in the [facility] box to learn more about \"Learning About ARBs. \"     If you do not have an account, please click on the \"Sign Up Now\" link. Current as of: August 31, 2020               Content Version: 12.9  © 2006-2021 [employer]. Care instructions adapted under license by [facility] ([facility]).  If you have questions about a medical condition or this instruction, always ask your healthcare professional. [place] [address] any warranty or liability for your use of this information.

## 2021-08-02 NOTE — PROGRESS NOTES
[facility]      Cardiology Consult    Mimi Avina  1960 August 2, 2021    Referring Physician: Dr. Jorden Chen  Reason for Referral: cardiomyopathy    CC: \"My blood pressure is better. \"     HPI:  The patient is 56 y.o. female with a past medical history significant for cardiomyopathy, nicotine addiction, and Type II DM presents for chronic management of a cardiomyopathy and aortic regurgitation. She had an echocardiogram ordered 1/2017 for a newly heard murmur. The echo report stated an EF of 40% with moderate aortic regurgitation. Her repeat echocardiogram 2/13/18 showed a recovered EF at 60% but her most recent echo 4/2021 showed a mildly reduced EF at 45-50%. Today, she states overall she is doing well. She denies any new cardiac complaints and states she continues to remain active without any exertional symptoms. She monitors her blood pressure at home and will typically average 130s/70s which is improved from the 150-160s prior to medication changes at last visit. . She denies any chest pains or worsening shortness of breath. She reports compliance with her medications and tolerating. She denies any abnormal bleeding or bruising. Patient denies exertional chest pain/pressure, dyspnea at rest, worsening BUSTAMANTE, PND, orthopnea, palpitations, lightheadedness, weight changes, changes in LE edema, and syncope.     Past Medical History:   Diagnosis Date    Anxiety     Arrhythmia     Asthma     Chronic back pain     Depression     Diabetes mellitus type II     Dyshidrotic dermatitis     Elevated blood pressure reading without diagnosis of hypertension     Essential hypertension 2/28/2018    Hyperlipidemia     Kidney stone     Liver disease     Lump or mass in breast     Orthostatic hypotension     Osteoarthritis     rheumatoid arthritis    Panic disorder     Personal history of renal calculi     kidney stones [id number]    Pneumonia 2007    RA [facility]oid [facility]) [facility] [facility] chronic    Scoliosis     Substance abuse ([facility] [facility]     Unspecified sleep apnea      Past Surgical History:   Procedure Laterality Date    APPENDECTOMY  1978    incidental    BREAST SURGERY  2002    breast tissue removed from right axillary     CATARACT REMOVAL Bilateral 10/2016    COLONOSCOPY  7.10.09    normal; Dr Wai Brown  08/23/2018    ok dr Juliana Quezada repeat in 8 years    CYST REMOVAL  1979    cyst on left ovary -removal of left ovary    ECTOPIC PREGNANCY SURGERY  1978    removal of left tube     FOOT SURGERY  9/1998    B/L release of the fascia     HYSTERECTOMY  1986    removal of uterus    SALPINGO-OOPHORECTOMY  3318,0988    left ovary removed following cyst removal, right tube and ovary removed due to adhesion    SINUS SURGERY  1994    polyps in sinuses    SPINE SURGERY  11/2016    lumbar     UPPER GASTROINTESTINAL ENDOSCOPY  08/23/2018    dr Juliana Quezada, recheck in 2 years     WRIST SURGERY  3/2003    repair of left wrist torn ligament-tenosynovitis release right      Family History   Problem Relation Age of Onset    Cancer Mother         breast cancer in her 48d   Kelsea Curl Diabetes Mother     High Blood Pressure Mother     Stroke Maternal Grandmother     Cancer Paternal Grandfather         bladder cancer    Diabetes Paternal Aunt     Diabetes Paternal Grandmother      Social History     Tobacco Use    Smoking status: Current Every Day Smoker     Packs/day: 0.75     Years: 30.00     Pack years: 22.50     Start date: 3/14/1975    Smokeless tobacco: Never Used    Tobacco comment: 1 cig per day, vaporing instead   Vaping Use    Vaping Use: Never assessed   Substance Use Topics    Alcohol use: No    Drug use: No     Allergies   Allergen Reactions    Azithromycin Itching, Rash, Other (See Comments) and Swelling     Other reaction(s):  Other (See Comments)  FEVER  FEVER    Baclofen Nausea And Vomiting, Itching and Rash    Nuts [Peanut Oil] Anaphylaxis    Peanut-Containing Drug Products Anaphylaxis    Sulfa Antibiotics Anaphylaxis and Rash    Adalimumab Other (See Comments)     \"immune system crashes\"    Avelox [Moxifloxacin Hcl In Nacl]     Bactrim     Belbuca [Buprenorphine Hcl] Itching and Other (See Comments)     Headaches, insomina    Infliximab Other (See Comments)     \"immune system crashes\"    Lisinopril Other (See Comments)     Cough    Zithromax [Azithromycin Dihydrate]     Adhesive Tape Rash     PAPER TAPE IS OK  PAPER TAPE IS OK    Moxifloxacin Rash, Other (See Comments) and Swelling     Other reaction(s): Other (See Comments)  FEVER  FEVER  Other reaction(s): Fever    Sulfamethoxazole-Trimethoprim Other (See Comments), Rash and Swelling     Other reaction(s):  Other (See Comments)  FEVER  FEVER     Current Outpatient Medications   Medication Sig Dispense Refill    hydroCHLOROthiazide (HYDRODIURIL) 25 MG tablet Take 1 tablet by mouth every morning 90 tablet 2    sulindac (CLINORIL) 200 MG tablet TAKE 1 TABLET TWICE DAILY 180 tablet 0    blood glucose test strips (ACCU-CHEK SMARTVIEW) strip TEST  ONE  TIME  DAILY 100 strip 3    atorvastatin (LIPITOR) 20 MG tablet TAKE 1 TABLET EVERY DAY 90 tablet 1    carvedilol (COREG) 25 MG tablet Take 1 tablet by mouth 2 times daily 180 tablet 2    cyclobenzaprine (FLEXERIL) 10 MG tablet TAKE 1 TABLET TWICE DAILY AS NEEDED FOR MUSCLE SPASMS 180 tablet 0    losartan (COZAAR) 100 MG tablet Take 1 tablet by mouth daily 90 tablet 3    montelukast (SINGULAIR) 10 MG tablet TAKE 1 TABLET EVERY DAY 90 tablet 1    pantoprazole (PROTONIX) 20 MG tablet Take 1 tablet by mouth every morning (before breakfast) 30 tablet 5    metFORMIN (GLUCOPHAGE-XR) 500 MG extended release tablet TAKE 1 TABLET TWICE DAILY 180 tablet 2    albuterol sulfate HFA (PROAIR HFA) 108 (90 Base) MCG/ACT inhaler Inhale 2 puffs into the lungs every 6 hours as needed for Wheezing 3 Inhaler 0    HYDROcodone-acetaminophen (NORCO) 5-325 MG per tablet Take 1 tablet by mouth every 6 hours as needed for Pain. 7.5-325mg      Blood Glucose Monitoring Suppl JOSSELYN 1 each by Does not apply route daily 1 Device 0    traZODone (DESYREL) 50 MG tablet Take 150 mg by mouth every evening   2    ALPRAZolam (XANAX PO) Take by mouth      venlafaxine (EFFEXOR XR) 150 MG extended release capsule TAKE 1 CAPSULE EVERY DAY 90 capsule 1    albuterol (PROVENTIL) (2.5 MG/3ML) 0.083% nebulizer solution Use one vial every 6 hours in nebulizer as needed 120 vial 2    ACCU-CHEK FASTCLIX LANCETS MISC TEST ONE TIME DAILY 102 each 3     No current facility-administered medications for this visit. Review of Systems:  · Constitutional: no unanticipated weight loss. There's been no change in energy level, sleep pattern, or activity level. No fevers, chills. · Eyes: No visual changes or diplopia. No scleral icterus. · ENT: No Headaches, hearing loss or vertigo. No mouth sores or sore throat. · Cardiovascular: as reviewed in HPI  · Respiratory: No cough or wheezing, no sputum production. No hematemesis. · Gastrointestinal: No abdominal pain, appetite loss, blood in stools. No change in bowel or bladder habits. · Genitourinary: No dysuria, trouble voiding, or hematuria. · Musculoskeletal:  No gait disturbance, no joint complaints. · Integumentary: No rash or pruritis. · Neurological: No headache, diplopia, change in muscle strength, numbness or tingling. · Psychiatric: No anxiety or depression. · Endocrine: No temperature intolerance. No excessive thirst, fluid intake, or urination. No tremor. · Hematologic/Lymphatic: No abnormal bruising or bleeding, blood clots or swollen lymph nodes. · Allergic/Immunologic: No nasal congestion or hives.     Physical Exam:   /70   Pulse 86   Ht 5' 6.5\" (1.689 m)   Wt 159 lb 9.6 oz (72.4 kg)   SpO2 96%   BMI 25.37 kg/m²   Wt Readings from Last 3 Encounters:   08/02/21 159 lb 9.6 oz (72.4 kg)   06/16/21 160 lb (72.6 kg)   06/08/21 164 lb (74.4 kg) ejection fraction of 60-65%. No regional wall motion abnormalities are seen. Mild concentric left ventricular hypertrophy is noted. Diastolic filling parameters suggests grade I diastolic dysfunction. Mild mitral regurgitation is present. Mild to moderate aortic regurgitation is present. There is mild tricuspid regurgitation with RVSP estimated at 26 mmHg.     Echo 4/9/21   Overall left ventricular systolic function is mildly depressed. Ejection fraction is visually estimated to be 45-50%. E/e'= 10.7. There is mild diffuse hypokinesis. There is reversal of E/A inflow velocities across the mitral valve. Indeterminate diastolic function. Aortic valve appears sclerotic but opens adequately. Moderate aortic regurgitation. The 10-year ASCVD risk score (Jacob Cook, et al., 2013) is: 15.4%    Values used to calculate the score:      Age: 56 years      Sex: Female      Is Non- : No      Diabetic: Yes      Tobacco smoker: Yes      Systolic Blood Pressure: 941 mmHg      Is BP treated: Yes      HDL Cholesterol: 55 mg/dL      Total Cholesterol: 164 mg/dL    Assessment/Plan:   1) Cardiomyopathy. EF 45-50%. Patient appears euvolemic and compensated. Lisinopril discontinued due to cough. Continue losartan 100 mg daily and Coreg 25 mg BID. Blood pressure is better controlled but averaging 130s/70. Will add hydrochlorothiazide 25 mg daily and continue to monitor blood pressure at home. Will repeat BMP in 2-3 weeks. 2) Aortic regurgitation/sclerosis. Moderate per report (4/9/21). Will continue to monitor clinically and with routine imaging. Plan on repeating echo ~4/2022. 3) Essential hypertension. Goal BP <130/80 with DM. Blood pressure is better controlled but averaging 130/70. Will add hydrochlorothiazide 25 mg daily and continue to monitor blood pressure at home. Will repeat BMP in 2-3 weeks. Continue losartan 100 mg daily and Coreg 25 mg BID. 4) Hyperlipidemia.  Continue statin therapy. 5) Nicotine Addiction. Encouraged smoking cessation but patient is in the contemplative stage. 6) Diabetes Type II. Management per PCP, Hgb A1c 6.0. Follow up in 6 months     Thank you very much for allowing me to participate in the care of your patient. Please do not hesitate to contact me if you have any questions. Sincerely,  Diane Geller. Lisa Wyatt, [address] [place], [place] [address] [address]  Ph: [phone]  Fax: [phone]    This note was scribed in the presence of Dr Lisa Wyatt MD by Georgina Jeffrey RN. Physician Attestation: The scribes documentation has been prepared under my direction and personally reviewed by me in its entirety. I confirm that the note above accurately reflects all work, treatment, procedures, and medical decision making performed by me. All portions of the note including but not limited to the chief complaint, history of present illness, physical exam, assessment and plan/medical decision making were personally reviewed, edited, and updated on the day of the visit.

## 2021-08-16 ENCOUNTER — TELEPHONE (OUTPATIENT)
Dept: FAMILY MEDICINE CLINIC | Age: 61
End: 2021-08-16

## 2021-08-16 RX ORDER — METFORMIN HYDROCHLORIDE 500 MG/1
TABLET, EXTENDED RELEASE ORAL
Qty: 180 TABLET | Refills: 2 | Status: SHIPPED | OUTPATIENT
Start: 2021-08-16 | End: 2022-03-24

## 2021-08-16 NOTE — TELEPHONE ENCOUNTER
Patient is calling to see if she should get the 3rd shot for Pfizer vaccine. Patient stated that she read if you have diabetes & Heart, you shouldn't get it.      Please advise [phone]

## 2021-09-16 ENCOUNTER — OFFICE VISIT (OUTPATIENT)
Dept: FAMILY MEDICINE CLINIC | Age: 61
End: 2021-09-16
Payer: MEDICARE

## 2021-09-16 VITALS
BODY MASS INDEX: 24.96 KG/M2 | HEIGHT: 67 IN | HEART RATE: 88 BPM | SYSTOLIC BLOOD PRESSURE: 124 MMHG | WEIGHT: 159 LBS | TEMPERATURE: 97 F | DIASTOLIC BLOOD PRESSURE: 74 MMHG

## 2021-09-16 DIAGNOSIS — Z23 NEED FOR TDAP VACCINATION: ICD-10-CM

## 2021-09-16 DIAGNOSIS — I10 ESSENTIAL HYPERTENSION: Primary | ICD-10-CM

## 2021-09-16 DIAGNOSIS — E11.9 TYPE 2 DIABETES MELLITUS WITHOUT COMPLICATION, WITHOUT LONG-TERM CURRENT USE OF INSULIN (HCC): ICD-10-CM

## 2021-09-16 PROCEDURE — 99213 OFFICE O/P EST LOW 20 MIN: CPT | Performed by: FAMILY MEDICINE

## 2021-09-16 PROCEDURE — 3044F HG A1C LEVEL LT 7.0%: CPT | Performed by: FAMILY MEDICINE

## 2021-09-16 PROCEDURE — 2022F DILAT RTA XM EVC RTNOPTHY: CPT | Performed by: FAMILY MEDICINE

## 2021-09-16 PROCEDURE — 90471 IMMUNIZATION ADMIN: CPT | Performed by: FAMILY MEDICINE

## 2021-09-16 PROCEDURE — 3017F COLORECTAL CA SCREEN DOC REV: CPT | Performed by: FAMILY MEDICINE

## 2021-09-16 PROCEDURE — G8419 CALC BMI OUT NRM PARAM NOF/U: HCPCS | Performed by: FAMILY MEDICINE

## 2021-09-16 PROCEDURE — G8427 DOCREV CUR MEDS BY ELIG CLIN: HCPCS | Performed by: FAMILY MEDICINE

## 2021-09-16 PROCEDURE — 4004F PT TOBACCO SCREEN RCVD TLK: CPT | Performed by: FAMILY MEDICINE

## 2021-09-16 PROCEDURE — 90715 TDAP VACCINE 7 YRS/> IM: CPT | Performed by: FAMILY MEDICINE

## 2021-09-16 ASSESSMENT — PATIENT HEALTH QUESTIONNAIRE - PHQ9
SUM OF ALL RESPONSES TO PHQ QUESTIONS 1-9: 0
2. FEELING DOWN, DEPRESSED OR HOPELESS: 0
SUM OF ALL RESPONSES TO PHQ QUESTIONS 1-9: 0
1. LITTLE INTEREST OR PLEASURE IN DOING THINGS: 0
SUM OF ALL RESPONSES TO PHQ QUESTIONS 1-9: 0
SUM OF ALL RESPONSES TO PHQ9 QUESTIONS 1 & 2: 0

## 2021-09-16 ASSESSMENT — ENCOUNTER SYMPTOMS
VOMITING: 0
ABDOMINAL PAIN: 0
BLOOD IN STOOL: 0
SHORTNESS OF BREATH: 0
NAUSEA: 0
CHEST TIGHTNESS: 0
CONSTIPATION: 0
DIARRHEA: 0
ABDOMINAL DISTENTION: 0

## 2021-09-16 NOTE — PROGRESS NOTES
Subjective:      Patient ID: Colton Khan is a 56 y.o. female. Chief Complaint   Patient presents with    Follow-up     hypertension, lipids        Patient presents with: Follow-up: hypertension, lipids    She is here for the above  She is well and no c/o  She did well with the surgery    meds the same     YOB: 1960    Date of Visit:  9/16/2021     -- Azithromycin -- Itching, Rash, Other (See Comments)                           and Swelling    --  Other reaction(s): Other (See Comments)             FEVER             FEVER   -- Baclofen -- Nausea And Vomiting, Itching and                            Rash   -- Nuts (Peanut Oil) -- Anaphylaxis   -- Peanut-Containing Drug Products -- Anaphylaxis   -- Sulfa Antibiotics -- Anaphylaxis and Rash   -- Adalimumab -- Other (See Comments)    --  \"immune system crashes\"   -- Avelox (Moxifloxacin Hcl In Nacl)    -- Bactrim    -- Belbuca (Buprenorphine Hcl) -- Itching and Other (See Comments)    --  Headaches, insomina   -- Infliximab -- Other (See Comments)    --  \"immune system crashes\"   -- Lisinopril -- Other (See Comments)    --  Cough   -- Zithromax (Azithromycin Dihydrate)    -- Adhesive Tape -- Rash    --  PAPER TAPE IS OK             PAPER TAPE IS OK   -- Moxifloxacin -- Rash, Other (See Comments) and                            Swelling    --  Other reaction(s): Other (See Comments)             FEVER             FEVER             Other reaction(s): Fever   -- Sulfamethoxazole-Trimethoprim -- Other (See Comments), Rash and                            Swelling    --  Other reaction(s):  Other (See Comments)             FEVER             FEVER    Current Outpatient Medications:  metFORMIN (GLUCOPHAGE-XR) 500 MG extended release tablet, TAKE 1 TABLET TWICE DAILY, Disp: 180 tablet, Rfl: 2  cyclobenzaprine (FLEXERIL) 10 MG tablet, TAKE 1 TABLET TWICE DAILY AS NEEDED FOR MUSCLE SPASMS, Disp: 180 tablet, Rfl: 1  sulindac (CLINORIL) 200 MG tablet, TAKE 1 TABLET TWICE DAILY, Disp: 180 tablet, Rfl: 1  hydroCHLOROthiazide (HYDRODIURIL) 25 MG tablet, Take 1 tablet by mouth every morning, Disp: 90 tablet, Rfl: 2  blood glucose test strips (ACCU-CHEK SMARTVIEW) strip, TEST  ONE  TIME  DAILY, Disp: 100 strip, Rfl: 3  atorvastatin (LIPITOR) 20 MG tablet, TAKE 1 TABLET EVERY DAY, Disp: 90 tablet, Rfl: 1  carvedilol (COREG) 25 MG tablet, Take 1 tablet by mouth 2 times daily, Disp: 180 tablet, Rfl: 2  losartan (COZAAR) 100 MG tablet, Take 1 tablet by mouth daily, Disp: 90 tablet, Rfl: 3  montelukast (SINGULAIR) 10 MG tablet, TAKE 1 TABLET EVERY DAY, Disp: 90 tablet, Rfl: 1  pantoprazole (PROTONIX) 20 MG tablet, Take 1 tablet by mouth every morning (before breakfast), Disp: 30 tablet, Rfl: 5  albuterol sulfate HFA (PROAIR HFA) 108 (90 Base) MCG/ACT inhaler, Inhale 2 puffs into the lungs every 6 hours as needed for Wheezing, Disp: 3 Inhaler, Rfl: 0  Blood Glucose Monitoring Suppl JOSSELYN, 1 each by Does not apply route daily, Disp: 1 Device, Rfl: 0  traZODone (DESYREL) 50 MG tablet, Take 150 mg by mouth every evening , Disp: , Rfl: 2  ALPRAZolam (XANAX PO), Take by mouth, Disp: , Rfl:   venlafaxine (EFFEXOR XR) 150 MG extended release capsule, TAKE 1 CAPSULE EVERY DAY, Disp: 90 capsule, Rfl: 1  albuterol (PROVENTIL) (2.5 MG/3ML) 0.083% nebulizer solution, Use one vial every 6 hours in nebulizer as needed, Disp: 120 vial, Rfl: 2  ACCU-CHEK FASTCLIX LANCETS MISC, TEST ONE TIME DAILY, Disp: 102 each, Rfl: 3   HYDROcodone-acetaminophen (NORCO) 5-325 MG per tablet, Take 1 tablet by mouth every 6 hours as needed for Pain.  7.5-325mg (Patient not taking: Reported on 9/16/2021), Disp: , Rfl:     No current facility-administered medications for this visit.      ---------------------------               09/16/21                      1311         ---------------------------   BP:          124/74         Site:    Left Upper Arm     Position:     Sitting        Cuff Size: Medium Adult      Pulse:         88           Temp:    97 °F (36.1 °C)    TempSrc:    Temporal        Weight: 159 lb (72.1 kg)    Height: 5' 6.5\" (1.689 m)  ---------------------------  Body mass index is 25.28 kg/m². Wt Readings from Last 3 Encounters:  09/16/21 : 159 lb (72.1 kg)  08/02/21 : 159 lb 9.6 oz (72.4 kg)  06/16/21 : 160 lb (72.6 kg)    BP Readings from Last 3 Encounters:  09/16/21 : 124/74  08/02/21 : 120/70  06/16/21 : (!) 174/100        Review of Systems   Constitutional: Negative for appetite change, chills, fever and unexpected weight change. Respiratory: Negative for chest tightness and shortness of breath. Cardiovascular: Negative for chest pain, palpitations and leg swelling. Gastrointestinal: Negative for abdominal distention, abdominal pain, blood in stool, constipation, diarrhea, nausea and vomiting. Neurological: Negative for headaches. Objective:   Physical Exam  Constitutional:       General: She is not in acute distress. Appearance: Normal appearance. She is well-developed. She is not ill-appearing or diaphoretic. Neck:      Thyroid: No thyroid mass or thyromegaly. Cardiovascular:      Rate and Rhythm: Normal rate and regular rhythm. Heart sounds: Normal heart sounds. No murmur heard. No friction rub. No gallop. Pulmonary:      Effort: Pulmonary effort is normal. No tachypnea, accessory muscle usage or respiratory distress. Breath sounds: Normal breath sounds. No decreased breath sounds, wheezing, rhonchi or rales. Musculoskeletal:      Cervical back: Neck supple. Lymphadenopathy:      Cervical: No cervical adenopathy. Upper Body:      Right upper body: No supraclavicular adenopathy. Left upper body: No supraclavicular adenopathy. Skin:     General: Skin is warm and dry. Coloration: Skin is not pale. Neurological:      Mental Status: She is alert. Assessment:        Diagnosis Orders   1.  Essential

## 2021-09-17 DIAGNOSIS — E87.8 ABNORMAL BLOOD ELECTROLYTE LEVEL: Primary | ICD-10-CM

## 2021-11-01 ENCOUNTER — TELEPHONE (OUTPATIENT)
Dept: CARDIOLOGY CLINIC | Age: 61
End: 2021-11-01

## 2021-11-01 ENCOUNTER — OFFICE VISIT (OUTPATIENT)
Dept: FAMILY MEDICINE CLINIC | Age: 61
End: 2021-11-01
Payer: MEDICARE

## 2021-11-01 VITALS
SYSTOLIC BLOOD PRESSURE: 124 MMHG | TEMPERATURE: 97 F | BODY MASS INDEX: 24.33 KG/M2 | HEIGHT: 67 IN | WEIGHT: 155 LBS | DIASTOLIC BLOOD PRESSURE: 82 MMHG | HEART RATE: 88 BPM

## 2021-11-01 DIAGNOSIS — E11.9 TYPE 2 DIABETES MELLITUS WITHOUT COMPLICATION, WITHOUT LONG-TERM CURRENT USE OF INSULIN (HCC): ICD-10-CM

## 2021-11-01 DIAGNOSIS — I42.8 NONISCHEMIC CARDIOMYOPATHY (HCC): ICD-10-CM

## 2021-11-01 DIAGNOSIS — M54.50 CHRONIC BILATERAL LOW BACK PAIN WITHOUT SCIATICA: ICD-10-CM

## 2021-11-01 DIAGNOSIS — I10 ESSENTIAL HYPERTENSION: ICD-10-CM

## 2021-11-01 DIAGNOSIS — G89.29 CHRONIC BILATERAL LOW BACK PAIN WITHOUT SCIATICA: ICD-10-CM

## 2021-11-01 DIAGNOSIS — Z01.818 PREOP EXAMINATION: Primary | ICD-10-CM

## 2021-11-01 PROCEDURE — G8484 FLU IMMUNIZE NO ADMIN: HCPCS | Performed by: FAMILY MEDICINE

## 2021-11-01 PROCEDURE — 4004F PT TOBACCO SCREEN RCVD TLK: CPT | Performed by: FAMILY MEDICINE

## 2021-11-01 PROCEDURE — 3017F COLORECTAL CA SCREEN DOC REV: CPT | Performed by: FAMILY MEDICINE

## 2021-11-01 PROCEDURE — G8427 DOCREV CUR MEDS BY ELIG CLIN: HCPCS | Performed by: FAMILY MEDICINE

## 2021-11-01 PROCEDURE — G8420 CALC BMI NORM PARAMETERS: HCPCS | Performed by: FAMILY MEDICINE

## 2021-11-01 PROCEDURE — 99213 OFFICE O/P EST LOW 20 MIN: CPT | Performed by: FAMILY MEDICINE

## 2021-11-01 PROCEDURE — 3044F HG A1C LEVEL LT 7.0%: CPT | Performed by: FAMILY MEDICINE

## 2021-11-01 PROCEDURE — 2022F DILAT RTA XM EVC RTNOPTHY: CPT | Performed by: FAMILY MEDICINE

## 2021-11-01 ASSESSMENT — ENCOUNTER SYMPTOMS
CHEST TIGHTNESS: 0
EYE PAIN: 0
NAUSEA: 0
SORE THROAT: 0
VOMITING: 0
BACK PAIN: 1
ABDOMINAL DISTENTION: 0
SHORTNESS OF BREATH: 0
DIARRHEA: 0
TROUBLE SWALLOWING: 0
ABDOMINAL PAIN: 0
CONSTIPATION: 0
VOICE CHANGE: 0
BLOOD IN STOOL: 0

## 2021-11-01 NOTE — TELEPHONE ENCOUNTER
Pre-operative risk assessment. Patient is low to intermediate cardiac risk based on RCRI score of one (CHF). Patient's risk should not preclude him/her from proceeding with surgery. Suggest continuation of B-blocker and statin in the mick-operative period.

## 2021-11-01 NOTE — PATIENT INSTRUCTIONS
Do contact dr Singh Brown for the ok with the upcoming surgery  On the morning of the surgery take the carvedilol with just enough water to get the pill down as soon as you wake up that morning   No diabetes medication the night before the surgery

## 2021-11-01 NOTE — TELEPHONE ENCOUNTER
Preoperative risk assessment. Patient planning to have procedure. Removal posterior screws and rods lumbar-2 sacral-1 on 11/9/2021.       Last office visit 8/2/2021  Hx cardiomyopathy,Aortic regurgitation,HTN,HLD,Diabetes

## 2021-11-01 NOTE — TELEPHONE ENCOUNTER
Cardiac Risk Assessment message information:     What type of procedure are you having:  REMOVAL POSTERIOR SCREWS AND RODS LUMBAR 2-SACRAL 1     When is your procedure scheduled for:  11/9/2021     Who is the physician performing your procedure:  Dr. Soraya East    Which medications need to be held for your procedure and for how long:    losartan and hydrochlorothiazide one day prior       Phone Number:  [phone]     Fax number to send the letter:  Dr. Jake Kan office fax number  [phone] [address] [facility] surgery fax number [phone]    Please fax to both    Clinical Staff use:     Cardiologist:  Last Appointment:  Next Appointment:  Are you on any blood thinners:  History of Coronary Artery Disease:  Last stress test:  Last echo:  Device Type and :

## 2021-11-01 NOTE — PROGRESS NOTES
Subjective:      Patient ID: Richard Tejada is a 56 y.o. female. Chief Complaint   Patient presents with    Pre-op Exam     Back Surgery on 11-9-2021 by Dr. Delfina Hartman at [address] fax [phone], [phone]        Patient presents with:  Pre-op Exam: Back Surgery on 11-9-2021 by Dr. Delfina Hartman at [address] fax [phone], [phone]    She is here for the above  She is having hardware removal lower back due to discomfort  She is ell except for pain  Her glucose is 100-120      height is 5' 6.5\" (1.689 m) and weight is 155 lb (70.3 kg). Her temporal temperature is 97 °F (36.1 °C). Her blood pressure is 124/82 and her pulse is 88. Allergies:     -- Azithromycin -- Itching, Rash, Other (See Comments)                           and Swelling    --  Other reaction(s): Other (See Comments)             FEVER             FEVER   -- Baclofen -- Nausea And Vomiting, Itching and                            Rash   -- Nuts (Peanut Oil) -- Anaphylaxis   -- Peanut-Containing Drug Products -- Anaphylaxis   -- Sulfa Antibiotics -- Anaphylaxis and Rash   -- Adalimumab -- Other (See Comments)    --  \"immune system crashes\"   -- Avelox (Moxifloxacin Hcl In Nacl)    -- Bactrim    -- Belbuca (Buprenorphine Hcl) -- Itching and Other (See Comments)    --  Headaches, insomina   -- Infliximab -- Other (See Comments)    --  \"immune system crashes\"   -- Lisinopril -- Other (See Comments)    --  Cough   -- Zithromax (Azithromycin Dihydrate)    -- Adhesive Tape -- Rash    --  PAPER TAPE IS OK             PAPER TAPE IS OK   -- Moxifloxacin -- Rash, Other (See Comments) and                            Swelling    --  Other reaction(s): Other (See Comments)             FEVER             FEVER             Other reaction(s): Fever   -- Sulfamethoxazole-Trimethoprim -- Other (See Comments), Rash and                            Swelling    --  Other reaction(s):  Other (See Comments)             FEVER             FEVER    current smoker  No ETOH extended release capsule, TAKE 1 CAPSULE EVERY DAY    albuterol (PROVENTIL) (2.5 MG/3ML) 0.083% nebulizer solution, Use one vial every 6 hours in nebulizer as needed ACCU-CHEK FASTCLIX LANCETS MISC, TEST ONE TIME DAILY    Past Surgical History:  1978: APPENDECTOMY      Comment:  incidental  2002: BREAST SURGERY      Comment:  breast tissue removed from right axillary   10/2016: CATARACT REMOVAL; Bilateral  7.10.09: COLONOSCOPY      Comment:  normal; Dr Alyssia Noble  08/23/2018: COLONOSCOPY      Comment:  ok dr Jennie Chung repeat in 10 years  1979: CYST REMOVAL      Comment:  cyst on left ovary -removal of left ovary  1978: ECTOPIC PREGNANCY SURGERY      Comment:  removal of left tube   9/1998: FOOT SURGERY      Comment:  B/L release of the fascia   1986: HYSTERECTOMY      Comment:  removal of uterus  06/2021: JOINT REPLACEMENT      Comment:  right hip   4432,2470: SALPINGO-OOPHORECTOMY      Comment:  left ovary removed following cyst removal, right tube                and ovary removed due to adhesion  1994: SINUS SURGERY      Comment:  polyps in sinuses  11/2016: SPINE SURGERY      Comment:  lumbar   08/23/2018: UPPER GASTROINTESTINAL ENDOSCOPY      Comment:  dr Jennie Chung, recheck in 2 years   3/2003: WRIST SURGERY      Comment:  repair of left wrist torn ligament-tenosynovitis release               right     No problems with anesthesia         Past Medical History:  No date: Anxiety  No date: Asthma  No date: Chronic back pain  No date: Depression  No date: Diabetes mellitus type II  2/28/2018: Essential hypertension  No date: Hyperlipidemia  No date: Kidney stone  No date: Liver disease  No date: Lump or mass in breast   Comment:  rheumatoid arthritis  No date: Panic disorder  No date: Scoliosis  No date: Unspecified sleep apnea    Family hx   No bleeding problems        Review of Systems   Constitutional: Negative for appetite change, chills, fever and unexpected weight change.    HENT: Negative for sore throat, trouble submandibular adenopathy. Left side of head: No submental or submandibular adenopathy. Cervical: No cervical adenopathy. Upper Body:      Right upper body: No supraclavicular adenopathy. Left upper body: No supraclavicular adenopathy. Skin:     General: Skin is warm and dry. Coloration: Skin is not pale. Nails: There is no clubbing. Neurological:      Mental Status: She is alert. Assessment:        Diagnosis Orders   1. Preop examination     2. Chronic bilateral low back pain without sciatica     3. Essential hypertension     4. Type 2 diabetes mellitus without complication, without long-term current use of insulin ([address]     5.  Nonischemic cardiomyopathy ([address]         She is getting blood work done at the hospital   Cardiac letter sent today and cleared    (23 minutes time)      Plan:      Do contact dr Katherne Sicard for the ok with the upcoming surgery  On the morning of the surgery take the carvedilol with just enough water to get the pill down as soon as you wake up that morning   No diabetes medication the night before the surgery         Georgia Sousa MD

## 2021-11-01 NOTE — LETTER
[facility] [place] HEART INST [facility]  Phone: [phone]  Fax: [phone]    Maine Noriega MD        November 1, 2021     Patient: Kendra Weston   YOB: 1960   Date of Visit: 11/1/2021       To Whom It May Concern:    Pre-operative risk assessment Margareth Alfaro  Patient is low to intermediate cardiac risk based on RCRI score of one (CHF). Patient's risk should not preclude him/her from proceeding with surgery. Suggest continuation of B-blocker and statin in the mick-operative period. If you have any questions or concerns, please don't hesitate to call.     Sincerely,        Maine Noriega MD

## 2021-11-10 ENCOUNTER — TELEPHONE (OUTPATIENT)
Dept: FAMILY MEDICINE CLINIC | Age: 61
End: 2021-11-10

## 2021-11-11 RX ORDER — MONTELUKAST SODIUM 10 MG/1
10 TABLET ORAL DAILY
Qty: 14 TABLET | Refills: 0 | Status: SHIPPED | OUTPATIENT
Start: 2021-11-11 | End: 2021-11-15

## 2021-11-11 RX ORDER — MONTELUKAST SODIUM 10 MG/1
10 TABLET ORAL DAILY
Qty: 10 TABLET | Refills: 0 | Status: SHIPPED | OUTPATIENT
Start: 2021-11-11 | End: 2021-11-11

## 2021-11-11 NOTE — TELEPHONE ENCOUNTER
Done  Orders Placed This Encounter   Medications    DISCONTD: montelukast (SINGULAIR) 10 MG tablet     Sig: Take 1 tablet by mouth daily     Dispense:  10 tablet     Refill:  0    montelukast (SINGULAIR) 10 MG tablet     Sig: Take 1 tablet by mouth daily     Dispense:  14 tablet     Refill:  0     corrected amount.  Disregard earlier script

## 2021-11-15 RX ORDER — MONTELUKAST SODIUM 10 MG/1
TABLET ORAL
Qty: 90 TABLET | Refills: 1 | Status: SHIPPED | OUTPATIENT
Start: 2021-11-15 | End: 2022-02-08

## 2021-11-30 ENCOUNTER — TELEPHONE (OUTPATIENT)
Dept: FAMILY MEDICINE CLINIC | Age: 61
End: 2021-11-30

## 2021-11-30 RX ORDER — ALBUTEROL SULFATE 90 UG/1
2 AEROSOL, METERED RESPIRATORY (INHALATION) EVERY 6 HOURS PRN
Qty: 18 G | Refills: 0 | Status: SHIPPED | OUTPATIENT
Start: 2021-11-30 | End: 2021-12-02

## 2021-11-30 NOTE — TELEPHONE ENCOUNTER
Pt is calling for a preventable inhaler - it has been about 2-3 years since she has had one. Pt has been SOB for about 2 weeks now - no other symptoms. [address] [facility]      [address] advise [phone] (home)

## 2021-11-30 NOTE — TELEPHONE ENCOUNTER
Med sent in  If it does no help she may need to be seen or to ER     Orders Placed This Encounter   Medications    albuterol sulfate HFA (PROAIR HFA) 108 (90 Base) MCG/ACT inhaler     Sig: Inhale 2 puffs into the lungs every 6 hours as needed for Wheezing or Shortness of Breath (cough)     Dispense:  18 g     Refill:  0

## 2021-12-10 ENCOUNTER — TELEPHONE (OUTPATIENT)
Dept: CARDIOLOGY CLINIC | Age: 61
End: 2021-12-10

## 2021-12-10 NOTE — TELEPHONE ENCOUNTER
Pt calling stating that she had some hardware removed from her back a month ago, since then she has been feeling dizzy when she stands up, her legs feel like rubber,and she has almost passed out but has not actually gone out. She says she has lost 10 lbs in the last month and she did decrease her water pill in half just in case that was to strong but not helped. The last 3 days her bp has been running around 85/55.      Her [facility] is [facility] [address] should a med need to be changed or called in

## 2021-12-10 NOTE — TELEPHONE ENCOUNTER
Called patient and I asked her to decrease her Losartan to 50 mg daily and call Monday with an update.

## 2021-12-13 RX ORDER — ATORVASTATIN CALCIUM 20 MG/1
TABLET, FILM COATED ORAL
Qty: 90 TABLET | Refills: 1 | Status: SHIPPED | OUTPATIENT
Start: 2021-12-13 | End: 2022-05-09

## 2021-12-13 NOTE — TELEPHONE ENCOUNTER
Called patient and she stated she is still getting dizzy and lightheaded. Her bp readings for the past couple of days has been 122/54 (highest) (lowest)  97/51. On 12/11/2021: 102/62, 12/12/2021 :95/57 and 12/13/2021:97/51.

## 2021-12-13 NOTE — TELEPHONE ENCOUNTER
Called patient who says she started having these symptoms after starting HCTZ. Symptoms worsened after surgery. She has lost approx 10 pounds since surgery. I asked her to hold HCTZ, continue to monitor BP and symptoms, call on Friday with an update. She agreed with the plan.

## 2021-12-17 NOTE — TELEPHONE ENCOUNTER
I called patient who says she stopped taking Hydrochlorothiazide after we spoke on Monday. She fell on Tuesday and typically takes all medication at the same time. On Tuesday morning after taking medication at 1010 AM, she immediately took her BP which was 128/48. At 1050 AM, she stood from a sitting position and felt lightheaded/dizzy, legs were weak and she fell, did not lose consciousness and checked her BP which was 75/46. Later that day at 442 PM, BP was 102/56. BP on Wed was no higher than 105/60 and Thursday's highest reading was 115/48. She checked BP while we were on the phone and it was 125/68. Asked patient to continue holding Hydrochlorothiazide, space Carvedilol and Losartan and keep a BP log. F/u with Dr. Jocelyne Avila scheduled for 1/5/22 at [facility] office.

## 2021-12-27 NOTE — PROGRESS NOTES
Vaccine Information Sheet, \"Influenza - Inactivated\"  given to Zoe Shepherd, or parent/legal guardian of  Zoe Shepherd and verbalized understanding. Patient responses:    Have you ever had a reaction to a flu vaccine? No  Do you have any current illness? No  Have you ever had Guillian Redgranite Syndrome? No  Do you have a serious allergy to any of the follow: Neomycin, Polymyxin, Thimerosal, eggs or egg products? No    Flu vaccine given per order. Please see immunization tab. Risks and benefits explained. Current VIS given. show

## 2022-01-03 RX ORDER — LOSARTAN POTASSIUM 100 MG/1
TABLET ORAL
Qty: 90 TABLET | Refills: 3 | Status: SHIPPED | OUTPATIENT
Start: 2022-01-03 | End: 2022-08-15

## 2022-01-05 ENCOUNTER — OFFICE VISIT (OUTPATIENT)
Dept: CARDIOLOGY CLINIC | Age: 62
End: 2022-01-05
Payer: MEDICARE

## 2022-01-05 VITALS
DIASTOLIC BLOOD PRESSURE: 84 MMHG | WEIGHT: 148 LBS | HEIGHT: 66 IN | SYSTOLIC BLOOD PRESSURE: 120 MMHG | HEART RATE: 85 BPM | OXYGEN SATURATION: 98 % | BODY MASS INDEX: 23.78 KG/M2

## 2022-01-05 DIAGNOSIS — E78.2 MIXED HYPERLIPIDEMIA: ICD-10-CM

## 2022-01-05 DIAGNOSIS — I35.8 AORTIC VALVE SCLEROSIS: ICD-10-CM

## 2022-01-05 DIAGNOSIS — I35.1 NON-RHEUMATIC AORTIC REGURGITATION: ICD-10-CM

## 2022-01-05 DIAGNOSIS — I10 ESSENTIAL HYPERTENSION: ICD-10-CM

## 2022-01-05 DIAGNOSIS — Z72.0 NICOTINE ABUSE: ICD-10-CM

## 2022-01-05 DIAGNOSIS — I42.8 NICM (NONISCHEMIC CARDIOMYOPATHY) (HCC): Primary | ICD-10-CM

## 2022-01-05 PROCEDURE — G8484 FLU IMMUNIZE NO ADMIN: HCPCS | Performed by: INTERNAL MEDICINE

## 2022-01-05 PROCEDURE — 93000 ELECTROCARDIOGRAM COMPLETE: CPT | Performed by: INTERNAL MEDICINE

## 2022-01-05 PROCEDURE — 4004F PT TOBACCO SCREEN RCVD TLK: CPT | Performed by: INTERNAL MEDICINE

## 2022-01-05 PROCEDURE — G8420 CALC BMI NORM PARAMETERS: HCPCS | Performed by: INTERNAL MEDICINE

## 2022-01-05 PROCEDURE — G8427 DOCREV CUR MEDS BY ELIG CLIN: HCPCS | Performed by: INTERNAL MEDICINE

## 2022-01-05 PROCEDURE — 3017F COLORECTAL CA SCREEN DOC REV: CPT | Performed by: INTERNAL MEDICINE

## 2022-01-05 PROCEDURE — 99214 OFFICE O/P EST MOD 30 MIN: CPT | Performed by: INTERNAL MEDICINE

## 2022-01-05 RX ORDER — CYCLOBENZAPRINE HCL 10 MG
TABLET ORAL
Qty: 180 TABLET | Refills: 1 | Status: SHIPPED | OUTPATIENT
Start: 2022-01-05 | End: 2022-06-02

## 2022-01-05 NOTE — PATIENT INSTRUCTIONS
Patient Education        Low Sodium Diet (2,000 Milligram): Care Instructions  Overview     Limiting sodium can be an important part of managing some health problems. The most common source of sodium is salt. People get most of the salt in their diet from canned, prepared, and packaged foods. Fast food and restaurant meals also are very high in sodium. Your doctor will probably limit your sodium to less than 2,000 milligrams (mg) a day. This limit counts all the sodium in prepared and packaged foods and any salt you add to your food. Follow-up care is a key part of your treatment and safety. Be sure to make and go to all appointments, and call your doctor if you are having problems. It's also a good idea to know your test results and keep a list of the medicines you take. How can you care for yourself at home? Read food labels  · Read labels on cans and food packages. The labels tell you how much sodium is in each serving. Make sure that you look at the serving size. If you eat more than the serving size, you have eaten more sodium. · Food labels also tell you the Percent Daily Value for sodium. Choose products with low Percent Daily Values for sodium. · Be aware that sodium can come in forms other than salt, including monosodium glutamate (MSG), sodium citrate, and sodium bicarbonate (baking soda). MSG is often added to Asian food. When you eat out, you can sometimes ask for food without MSG or added salt. Buy low-sodium foods  · Buy foods that are labeled \"unsalted\" (no salt added), \"sodium-free\" (less than 5 mg of sodium per serving), or \"low-sodium\" (140 mg or less of sodium per serving). Foods labeled \"reduced-sodium\" and \"light sodium\" may still have too much sodium. Be sure to read the label to see how much sodium you are getting. · Buy fresh vegetables, or frozen vegetables without added sauces. Buy low-sodium versions of canned vegetables, soups, and other canned goods.   Prepare low-sodium meals  · Cut back on the amount of salt you use in cooking. This will help you adjust to the taste. Do not add salt after cooking. One teaspoon of salt has about 2,300 mg of sodium. · Take the salt shaker off the table. · Flavor your food with garlic, lemon juice, onion, vinegar, herbs, and spices. Do not use soy sauce, lite soy sauce, steak sauce, onion salt, garlic salt, celery salt, or ketchup on your food. · Use low-sodium salad dressings, sauces, and ketchup. Or make your own salad dressings and sauces without adding salt. · Use less salt (or none) when recipes call for it. You can often use half the salt a recipe calls for without losing flavor. Other foods such as rice, pasta, and grains do not need added salt. · Rinse canned vegetables, and cook them in fresh water. This removes some--but not all--of the salt. · Avoid water that is naturally high in sodium or that has been treated with water softeners, which add sodium. If you buy bottled water, read the label and choose a sodium-free brand. Avoid high-sodium foods  · Avoid eating:  ? Smoked, cured, salted, and canned meat, fish, and poultry. ? Ham, jimenez, hot dogs, and luncheon meats. ? Regular, hard, and processed cheese and regular peanut butter. ? Crackers with salted tops, and other salted snack foods such as pretzels, chips, and salted popcorn. ? Frozen prepared meals, unless labeled low-sodium. ? Canned and dried soups, broths, and bouillon, unless labeled sodium-free or low-sodium. ? Canned vegetables, unless labeled sodium-free or low-sodium. ? Western Beena fries, pizza, tacos, and other fast foods. ? Pickles, olives, ketchup, and other condiments, especially soy sauce, unless labeled sodium-free or low-sodium. Where can you learn more? Go to https://johnna.[email or web address] org and sign in to your [employer] account.  Enter L793 in the [facility] box to learn more about \"Low Sodium Diet (2,000 Milligram): Care Instructions. \"     If you do not have an account, please click on the \"Sign Up Now\" link. Current as of: September 8, 2021               Content Version: 13.1  © 5360-5696 Healthwise, Incorporated. Care instructions adapted under license by [facility] ([facility]). If you have questions about a medical condition or this instruction, always ask your healthcare professional. [address] any warranty or liability for your use of this information.

## 2022-01-05 NOTE — PROGRESS NOTES
[address]      Cardiology Consult    [facility]  1960 January 5, 2022    Referring Physician: Dr. Amanda Sandoval  Reason for Referral: cardiomyopathy    CC: \"blood pressure was low\"     HPI:  The patient is 59 y.o. female with a past medical history significant for cardiomyopathy, nicotine addiction, and Type II DM presents for chronic management of a cardiomyopathy and aortic regurgitation. She had an echocardiogram ordered 1/2017 for a newly heard murmur. The echo report stated an EF of 40% with moderate aortic regurgitation. Her repeat echocardiogram 2/13/18 showed a recovered EF at 60% but her most recent echo 4/2021 showed a mildly reduced EF at 45-50%. She called the office with reports of lightheadedness and hypotension after her surgery in November. She also lost 10 pounds after surgery. She initially reduced the Losartan to 50 mg but her symptoms continued and then discontinued the hydrochlorothiazide. Her BP improved and started to become hypertensive again so she resumed the Losartan 100 mg daily. Today, she states overall she is feeling better. She denies any new cardiac complaints. She monitors her blood pressure at home and states it controlled. She denies any recurrent lightheadedness or hypotension. She denies any chest pains or worsening shortness of breath. She reports compliance with her medications and tolerating. She denies any abnormal bleeding or bruising. Patient denies exertional chest pain/pressure, dyspnea at rest, worsening BUSTAMANTE, PND, orthopnea, palpitations, lightheadedness, weight changes, changes in LE edema, and syncope.     Past Medical History:   Diagnosis Date    Anxiety     Arrhythmia     Asthma     Chronic back pain     Depression     Diabetes mellitus type II     Dyshidrotic dermatitis     Elevated blood pressure reading without diagnosis of hypertension     Essential hypertension 2/28/2018    Hyperlipidemia     Kidney stone     Liver disease     Lump or mass in breast     Orthostatic hypotension     Osteoarthritis     rheumatoid arthritis    Panic disorder     Personal history of renal calculi     kidney stones [id number]    Pneumonia 2007    RA (rheumatoid arthritis) ([facility] [address]     chronic    Scoliosis     Substance abuse ([facility] [address]     Unspecified sleep apnea      Past Surgical History:   Procedure Laterality Date    APPENDECTOMY  1978    incidental    BREAST SURGERY  2002    breast tissue removed from right axillary     CATARACT REMOVAL Bilateral 10/2016    COLONOSCOPY  7.10.09    normal; Dr Kirstin Black  08/23/2018    ok dr Madai Whitmore repeat in 8 years    CYST REMOVAL  1979    cyst on left ovary -removal of left ovary    ECTOPIC PREGNANCY SURGERY  1978    removal of left tube     FOOT SURGERY  9/1998    B/L release of the fascia     HIP SURGERY      HYSTERECTOMY  1986    removal of uterus    JOINT REPLACEMENT  06/2021    right hip     SALPINGO-OOPHORECTOMY  0670,2363    left ovary removed following cyst removal, right tube and ovary removed due to adhesion    SINUS SURGERY  1994    polyps in sinuses    SPINE SURGERY  11/2016    lumbar     UPPER GASTROINTESTINAL ENDOSCOPY  08/23/2018    dr Madai Whitmore, recheck in 2 years     WRIST SURGERY  3/2003    repair of left wrist torn ligament-tenosynovitis release right      Family History   Problem Relation Age of Onset    Cancer Mother         breast cancer in her 48d   Jennifer Patel Diabetes Mother     High Blood Pressure Mother     Stroke Maternal Grandmother     Cancer Paternal Grandfather         bladder cancer    Diabetes Paternal Aunt     Diabetes Paternal Grandmother      Social History     Tobacco Use    Smoking status: Current Every Day Smoker     Packs/day: 0.75     Years: 30.00     Pack years: 22.50     Start date: 3/14/1975    Smokeless tobacco: Never Used    Tobacco comment: 6 cig daily    Vaping Use    Vaping Use: Not on file   Substance Use Topics    Alcohol use: No    Drug use: No     Allergies   Allergen Reactions    Azithromycin Itching, Rash, Other (See Comments) and Swelling     Other reaction(s): Other (See Comments)  FEVER  FEVER    Baclofen Nausea And Vomiting, Itching and Rash    Nuts [Peanut Oil] Anaphylaxis    Peanut-Containing Drug Products Anaphylaxis    Sulfa Antibiotics Anaphylaxis and Rash    Adalimumab Other (See Comments)     \"immune system crashes\"    Avelox [Moxifloxacin Hcl In Nacl]     Bactrim     Belbuca [Buprenorphine Hcl] Itching and Other (See Comments)     Headaches, insomina    Infliximab Other (See Comments)     \"immune system crashes\"    Lisinopril Other (See Comments)     Cough    Zithromax [Azithromycin Dihydrate]     Adhesive Tape Rash     PAPER TAPE IS OK  PAPER TAPE IS OK    Moxifloxacin Rash, Other (See Comments) and Swelling     Other reaction(s): Other (See Comments)  FEVER  FEVER  Other reaction(s): Fever    Sulfamethoxazole-Trimethoprim Other (See Comments), Rash and Swelling     Other reaction(s):  Other (See Comments)  FEVER  FEVER     Current Outpatient Medications   Medication Sig Dispense Refill    cyclobenzaprine (FLEXERIL) 10 MG tablet TAKE 1 TABLET TWICE DAILY AS NEEDED FOR MUSCLE SPASMS 180 tablet 1    losartan (COZAAR) 100 MG tablet TAKE 1 TABLET EVERY DAY 90 tablet 3    atorvastatin (LIPITOR) 20 MG tablet TAKE 1 TABLET EVERY DAY 90 tablet 1    albuterol sulfate  (90 Base) MCG/ACT inhaler INHALE 2 PUFFS INTO THE LUNGS EVERY 6 HOURS AS NEEDED FOR WHEEZING OR SHORTNESS OF BREATH OR COUGH 54 g 1    montelukast (SINGULAIR) 10 MG tablet TAKE 1 TABLET EVERY DAY 90 tablet 1    metFORMIN (GLUCOPHAGE-XR) 500 MG extended release tablet TAKE 1 TABLET TWICE DAILY 180 tablet 2    sulindac (CLINORIL) 200 MG tablet TAKE 1 TABLET TWICE DAILY 180 tablet 1    blood glucose test strips (ACCU-CHEK SMARTVIEW) strip TEST  ONE  TIME  DAILY 100 strip 3    carvedilol (COREG) 25 MG tablet Take 1 tablet by mouth 2 times daily 180 tablet 2    Blood Glucose Monitoring Suppl JOSSELYN 1 each by Does not apply route daily 1 Device 0    traZODone (DESYREL) 50 MG tablet Take 100 mg by mouth nightly Take 100mg at night  2    ALPRAZolam (XANAX PO) Take by mouth      venlafaxine (EFFEXOR XR) 150 MG extended release capsule TAKE 1 CAPSULE EVERY DAY 90 capsule 1    albuterol (PROVENTIL) (2.5 MG/3ML) 0.083% nebulizer solution Use one vial every 6 hours in nebulizer as needed 120 vial 2    ACCU-CHEK FASTCLIX LANCETS MISC TEST ONE TIME DAILY 102 each 3     No current facility-administered medications for this visit. Review of Systems:  · Constitutional: no unanticipated weight loss. There's been no change in energy level, sleep pattern, or activity level. No fevers, chills. · Eyes: No visual changes or diplopia. No scleral icterus. · ENT: No Headaches, hearing loss or vertigo. No mouth sores or sore throat. · Cardiovascular: as reviewed in HPI  · Respiratory: No cough or wheezing, no sputum production. No hematemesis. · Gastrointestinal: No abdominal pain, appetite loss, blood in stools. No change in bowel or bladder habits. · Genitourinary: No dysuria, trouble voiding, or hematuria. · Musculoskeletal:  No gait disturbance, no joint complaints. · Integumentary: No rash or pruritis. · Neurological: No headache, diplopia, change in muscle strength, numbness or tingling. · Psychiatric: No anxiety or depression. · Endocrine: No temperature intolerance. No excessive thirst, fluid intake, or urination. No tremor. · Hematologic/Lymphatic: No abnormal bruising or bleeding, blood clots or swollen lymph nodes. · Allergic/Immunologic: No nasal congestion or hives.     Physical Exam:   /84   Pulse 85   Ht 5' 6\" (1.676 m)   Wt 148 lb (67.1 kg)   SpO2 98%   BMI 23.89 kg/m²   Wt Readings from Last 3 Encounters:   01/05/22 148 lb (67.1 kg)   11/01/21 155 lb (70.3 kg)   09/16/21 159 lb (72.1 kg)     Constitutional: She is oriented to person, place, and time. She appears well-developed and well-nourished. In no acute distress. Head: Normocephalic and atraumatic. Pupils equal and round. Neck: Neck supple. No JVP or carotid bruit appreciated. No mass and no thyromegaly present. No lymphadenopathy present. Cardiovascular: Tachycardic but regular. Normal heart sounds. Exam reveals no gallop and no friction rub. No murmur heard. Pulmonary/Chest: Effort normal and breath sounds normal. No respiratory distress. She no wheezing. No  rhonchi or rales. Abdominal: Soft, non-tender. Bowel sounds are normal. She exhibits no organomegaly, mass or bruit. Extremities: No edema, cyanosis, or clubbing. Pulses are 2+ radial/carotid bilaterally. Neurological: No gross cranial nerve deficit. Coordination normal.   Skin: Skin is warm and dry. There is no rash or diaphoresis. Psychiatric: She has a normal mood and affect. Her speech is normal and behavior is normal.     Lab Review:   FLP:    Lab Results   Component Value Date    TRIG 167 07/21/2020    HDL 55 04/05/2021    LDLCALC 81 04/05/2021    LABVLDL 28 04/05/2021     BUN/Creatinine:    Lab Results   Component Value Date    BUN 15 09/16/2021    CREATININE 0.8 09/16/2021     EKG Interpretation: 9/23/16 NSR. Minimal voltage criteria for LVH.   11/28/18 Sinus tachycardia. Voltage criteria for LVH. Nonspecific ST-T abnormality. 3/24/21 Sinus tachycardia. Cannot rule out anterior infarct. Nonspecific ST-T abnormality. 1/5/22 Sinus rhythm. Poor R-wave progression. Nonspecific T-abnormality. Image Review:   GXT myoview 8/2013  Normal study except hypertensive response to exercise. Echo 1/27/17 personally reviewed  Overall heart function appears at the lower limits of normal to mildly reduced with an EF 50%. The left atrium is normal in size. There is mild aortic regurgitation. The right ventricle is normal in size and function.     Echo 2/13/18 personally reviewed  Normal left ventricle size and systolic function with an estimated ejection fraction of 60-65%. No regional wall motion abnormalities are seen. Mild concentric left ventricular hypertrophy is noted. Diastolic filling parameters suggests grade I diastolic dysfunction. Mild mitral regurgitation is present. Mild to moderate aortic regurgitation is present. There is mild tricuspid regurgitation with RVSP estimated at 26 mmHg.     Echo 4/9/21   Overall left ventricular systolic function is mildly depressed. Ejection fraction is visually estimated to be 45-50%. E/e'= 10.7. There is mild diffuse hypokinesis. There is reversal of E/A inflow velocities across the mitral valve. Indeterminate diastolic function. Aortic valve appears sclerotic but opens adequately. Moderate aortic regurgitation. The 10-year ASCVD risk score (Gala Roman, et al., 2013) is: 16.8%    Values used to calculate the score:      Age: 59 years      Sex: Female      Is Non- : No      Diabetic: Yes      Tobacco smoker: Yes      Systolic Blood Pressure: 812 mmHg      Is BP treated: Yes      HDL Cholesterol: 55 mg/dL      Total Cholesterol: 164 mg/dL    Assessment/Plan:   1) Cardiomyopathy. EF 45-50%. Patient appears euvolemic and compensated. Lisinopril discontinued due to cough and hydrochlorothiazide discontinued with symptomatic hypotension. Continue losartan 100 mg daily and Coreg 25 mg BID. Instructed to call with any worsening symptoms. 2) Aortic regurgitation/sclerosis. Moderate per report (4/9/21). Will continue to monitor clinically and with routine imaging. Plan on repeating echo ~4/2022. 3) Essential hypertension. Controlled and symptomatic hypotension has improved. Goal BP <130/80 with DM. Hydrochlorothiazide discontinued. Encouraged to monitor blood pressure at home. Continue losartan 100 mg daily and Coreg 25 mg BID. 4) Hyperlipidemia. Continue statin therapy. 5) Nicotine Addiction.  Encouraged smoking cessation but patient is in the contemplative stage. 6) Diabetes Type II. Management per PCP, Hgb A1c 6.0. Follow up in 4 months     Thank you very much for allowing me to participate in the care of your patient. Please do not hesitate to contact me if you have any questions. Sincerely,  Reina Nunes. Mary Ramachandran, [address] [place] [address]  Ph: [phone]  Fax: [phone]    This note was scribed in the presence of Dr Mary Ramachandran MD by Manuel Bone RN. Physician Attestation: The scribes documentation has been prepared under my direction and personally reviewed by me in its entirety. I confirm that the note above accurately reflects all work, treatment, procedures, and medical decision making performed by me. All portions of the note including but not limited to the chief complaint, history of present illness, physical exam, assessment and plan/medical decision making were personally reviewed, edited, and updated on the day of the visit.

## 2022-01-19 RX ORDER — CARVEDILOL 25 MG/1
TABLET ORAL
Qty: 90 TABLET | Refills: 3 | Status: SHIPPED | OUTPATIENT
Start: 2022-01-19 | End: 2022-06-15

## 2022-01-20 ENCOUNTER — OFFICE VISIT (OUTPATIENT)
Dept: FAMILY MEDICINE CLINIC | Age: 62
End: 2022-01-20
Payer: MEDICARE

## 2022-01-20 VITALS
SYSTOLIC BLOOD PRESSURE: 138 MMHG | WEIGHT: 150 LBS | TEMPERATURE: 97 F | HEIGHT: 66 IN | BODY MASS INDEX: 24.11 KG/M2 | DIASTOLIC BLOOD PRESSURE: 84 MMHG

## 2022-01-20 DIAGNOSIS — J01.00 ACUTE NON-RECURRENT MAXILLARY SINUSITIS: ICD-10-CM

## 2022-01-20 DIAGNOSIS — M54.50 ACUTE BILATERAL LOW BACK PAIN WITHOUT SCIATICA: Primary | ICD-10-CM

## 2022-01-20 DIAGNOSIS — R82.90 ABNORMAL URINE: ICD-10-CM

## 2022-01-20 LAB
BILIRUBIN, POC: NORMAL
BLOOD URINE, POC: NORMAL
CLARITY, POC: NORMAL
COLOR, POC: YELLOW
GLUCOSE URINE, POC: NORMAL
KETONES, POC: NORMAL
LEUKOCYTE EST, POC: NORMAL
NITRITE, POC: NORMAL
PH, POC: 6
PROTEIN, POC: NORMAL
SPECIFIC GRAVITY, POC: 1.01
UROBILINOGEN, POC: 0.2

## 2022-01-20 PROCEDURE — G8427 DOCREV CUR MEDS BY ELIG CLIN: HCPCS | Performed by: FAMILY MEDICINE

## 2022-01-20 PROCEDURE — 4004F PT TOBACCO SCREEN RCVD TLK: CPT | Performed by: FAMILY MEDICINE

## 2022-01-20 PROCEDURE — 81002 URINALYSIS NONAUTO W/O SCOPE: CPT | Performed by: FAMILY MEDICINE

## 2022-01-20 PROCEDURE — 3017F COLORECTAL CA SCREEN DOC REV: CPT | Performed by: FAMILY MEDICINE

## 2022-01-20 PROCEDURE — 99213 OFFICE O/P EST LOW 20 MIN: CPT | Performed by: FAMILY MEDICINE

## 2022-01-20 PROCEDURE — G8420 CALC BMI NORM PARAMETERS: HCPCS | Performed by: FAMILY MEDICINE

## 2022-01-20 PROCEDURE — G8484 FLU IMMUNIZE NO ADMIN: HCPCS | Performed by: FAMILY MEDICINE

## 2022-01-20 RX ORDER — AMOXICILLIN AND CLAVULANATE POTASSIUM 875; 125 MG/1; MG/1
1 TABLET, FILM COATED ORAL 2 TIMES DAILY
Qty: 20 TABLET | Refills: 0 | Status: SHIPPED | OUTPATIENT
Start: 2022-01-20 | End: 2022-01-30

## 2022-01-20 NOTE — PROGRESS NOTES
Subjective:      Patient ID: Lucy Dick is a 59 y.o. female. Chief Complaint   Patient presents with    Congestion     sinus drainage, scratchy throat, chest congestion, tired x 3 weeks- 2 at home negative covid tests    Back Pain     kidney pain        Patient presents with:  Congestion: sinus drainage, scratchy throat, chest congestion, tired x 3 weeks- 2 at home negative covid tests  Back Pain: kidney pain    Bilateral lower back pain  For one week  Legs ok  Hurts to move at times  Urine is passing ok no sx no paion no blood   pnd and sinus d/c  throat discomfort  No fever no facial pain     YOB: 1960    Date of Visit:  1/20/2022     -- Azithromycin -- Itching, Rash, Other (See Comments)                           and Swelling    --  Other reaction(s): Other (See Comments)             FEVER             FEVER   -- Baclofen -- Nausea And Vomiting, Itching and                            Rash   -- Nuts (Peanut Oil) -- Anaphylaxis   -- Peanut-Containing Drug Products -- Anaphylaxis   -- Sulfa Antibiotics -- Anaphylaxis and Rash   -- Adalimumab -- Other (See Comments)    --  \"immune system crashes\"   -- Avelox (Moxifloxacin Hcl In Nacl)    -- Bactrim    -- Belbuca (Buprenorphine Hcl) -- Itching and Other (See Comments)    --  Headaches, insomina   -- Infliximab -- Other (See Comments)    --  \"immune system crashes\"   -- Lisinopril -- Other (See Comments)    --  Cough   -- Zithromax (Azithromycin Dihydrate)    -- Adhesive Tape -- Rash    --  PAPER TAPE IS OK             PAPER TAPE IS OK   -- Moxifloxacin -- Rash, Other (See Comments) and                            Swelling    --  Other reaction(s): Other (See Comments)             FEVER             FEVER             Other reaction(s): Fever   -- Sulfamethoxazole-Trimethoprim -- Other (See Comments), Rash and                            Swelling    --  Other reaction(s):  Other (See Comments)             FEVER             FEVER    Current Outpatient Medications:  carvedilol (COREG) 25 MG tablet, TAKE 1 TABLET TWICE DAILY, Disp: 90 tablet, Rfl: 3  cyclobenzaprine (FLEXERIL) 10 MG tablet, TAKE 1 TABLET TWICE DAILY AS NEEDED FOR MUSCLE SPASMS, Disp: 180 tablet, Rfl: 1  losartan (COZAAR) 100 MG tablet, TAKE 1 TABLET EVERY DAY, Disp: 90 tablet, Rfl: 3  atorvastatin (LIPITOR) 20 MG tablet, TAKE 1 TABLET EVERY DAY, Disp: 90 tablet, Rfl: 1  albuterol sulfate  (90 Base) MCG/ACT inhaler, INHALE 2 PUFFS INTO THE LUNGS EVERY 6 HOURS AS NEEDED FOR WHEEZING OR SHORTNESS OF BREATH OR COUGH, Disp: 54 g, Rfl: 1  montelukast (SINGULAIR) 10 MG tablet, TAKE 1 TABLET EVERY DAY, Disp: 90 tablet, Rfl: 1  metFORMIN (GLUCOPHAGE-XR) 500 MG extended release tablet, TAKE 1 TABLET TWICE DAILY, Disp: 180 tablet, Rfl: 2  sulindac (CLINORIL) 200 MG tablet, TAKE 1 TABLET TWICE DAILY, Disp: 180 tablet, Rfl: 1  blood glucose test strips (ACCU-CHEK SMARTVIEW) strip, TEST  ONE  TIME  DAILY, Disp: 100 strip, Rfl: 3  Blood Glucose Monitoring Suppl JOSSELYN, 1 each by Does not apply route daily, Disp: 1 Device, Rfl: 0  traZODone (DESYREL) 50 MG tablet, Take 100 mg by mouth nightly Take 100mg at night, Disp: , Rfl: 2  ALPRAZolam (XANAX PO), Take by mouth, Disp: , Rfl:   venlafaxine (EFFEXOR XR) 150 MG extended release capsule, TAKE 1 CAPSULE EVERY DAY, Disp: 90 capsule, Rfl: 1  albuterol (PROVENTIL) (2.5 MG/3ML) 0.083% nebulizer solution, Use one vial every 6 hours in nebulizer as needed, Disp: 120 vial, Rfl: 2  ACCU-CHEK FASTCLIX LANCETS [facility], TEST ONE TIME DAILY, Disp: 102 each, Rfl: 3     No current facility-administered medications for this visit.      -------------------------              01/20/22                    1601        -------------------------   BP:         138/84        Site:   Left Upper Arm    Position:    Sitting       Cuff Size: Medium Adult     Temp:   97 °F (36.1 °C)   TempSrc:   Temporal       Weight: 150 lb (68 kg)    Height: 5' 6\" (1.676 m)  -------------------------  Body mass index is 24.21 kg/m². Wt Readings from Last 3 Encounters:  01/20/22 : 150 lb (68 kg)  01/05/22 : 148 lb (67.1 kg)  11/01/21 : 155 lb (70.3 kg)    BP Readings from Last 3 Encounters:  01/20/22 : 138/84  01/05/22 : 120/84  11/01/21 : 124/82            Review of Systems    Objective:   Physical Exam  Constitutional:       General: She is not in acute distress. Appearance: Normal appearance. She is well-developed. She is not ill-appearing or diaphoretic. HENT:      Head: Normocephalic and atraumatic. Right Ear: Tympanic membrane and ear canal normal.      Left Ear: Tympanic membrane and ear canal normal.      Nose: Congestion present. Right Turbinates: Swollen. Left Turbinates: Swollen. Right Sinus: Maxillary sinus tenderness present. Mouth/Throat:      Lips: Pink. Mouth: Mucous membranes are moist. No oral lesions. Pharynx: Oropharynx is clear. Cardiovascular:      Rate and Rhythm: Normal rate and regular rhythm. Comments:     Pulmonary:      Effort: Pulmonary effort is normal. No tachypnea, accessory muscle usage or respiratory distress. Breath sounds: Wheezing present. No decreased breath sounds, rhonchi or rales. Comments: Few wheezes in bilateral posterior chest     Chest:   Breasts:      Right: No supraclavicular adenopathy. Left: No supraclavicular adenopathy. Abdominal:      General: Abdomen is flat. Bowel sounds are normal. There is no distension or abdominal bruit. Palpations: Abdomen is soft. There is no hepatomegaly, splenomegaly, mass or pulsatile mass. Tenderness: There is no abdominal tenderness. There is no right CVA tenderness, left CVA tenderness or guarding. Comments: She is tender to palpate the back no marks or rash on the back    Musculoskeletal:      Cervical back: Neck supple.    Lymphadenopathy:      Head:      Right side of head: No submental or submandibular adenopathy. Left side of head: No submental or submandibular adenopathy. Cervical: No cervical adenopathy. Upper Body:      Right upper body: No supraclavicular adenopathy. Left upper body: No supraclavicular adenopathy. Skin:     General: Skin is warm and dry. Coloration: Skin is not pale. Nails: There is no clubbing. Neurological:      Mental Status: She is alert. Assessment:       Diagnosis Orders   1. Acute bilateral low back pain without sciatica  POCT Urinalysis no Micro   2. Acute non-recurrent maxillary sinusitis     3.  Abnormal urine  Culture, Urine       Orders Placed This Encounter   Medications    amoxicillin-clavulanate (AUGMENTIN) 875-125 MG per tablet     Sig: Take 1 tablet by mouth 2 times daily for 10 days     Dispense:  20 tablet     Refill:  0     Results for POC orders placed in visit on 01/20/22   POCT Urinalysis no Micro   Result Value Ref Range    Color, UA yellow     Clarity, UA cloudy     Glucose, UA POC neg     Bilirubin, UA neg     Ketones, UA neg     Spec Grav, UA 1.010     Blood, UA POC neg     pH, UA 6.0     Protein, UA POC neg     Urobilinogen, UA 0.2     Leukocytes, UA trace     Nitrite, UA neg            Plan:      Do take fluids  Use the antibiotic  Call if not better          Jhoan Lanier MD

## 2022-01-22 LAB — URINE CULTURE, ROUTINE: NORMAL

## 2022-02-07 ENCOUNTER — TELEPHONE (OUTPATIENT)
Dept: FAMILY MEDICINE CLINIC | Age: 62
End: 2022-02-07

## 2022-02-07 ENCOUNTER — HOSPITAL ENCOUNTER (OUTPATIENT)
Dept: GENERAL RADIOLOGY | Age: 62
Discharge: HOME | End: 2022-02-07
Payer: MEDICARE

## 2022-02-07 ENCOUNTER — OFFICE VISIT (OUTPATIENT)
Dept: FAMILY MEDICINE CLINIC | Age: 62
End: 2022-02-07
Payer: MEDICARE

## 2022-02-07 ENCOUNTER — HOSPITAL ENCOUNTER (OUTPATIENT)
Age: 62
Discharge: HOME | End: 2022-02-07
Payer: MEDICARE

## 2022-02-07 VITALS
HEIGHT: 66 IN | TEMPERATURE: 97.1 F | SYSTOLIC BLOOD PRESSURE: 130 MMHG | WEIGHT: 148 LBS | BODY MASS INDEX: 23.78 KG/M2 | DIASTOLIC BLOOD PRESSURE: 84 MMHG

## 2022-02-07 DIAGNOSIS — R10.2 SUPRAPUBIC PAIN: ICD-10-CM

## 2022-02-07 DIAGNOSIS — E11.9 TYPE 2 DIABETES MELLITUS WITHOUT COMPLICATION, WITHOUT LONG-TERM CURRENT USE OF INSULIN (HCC): ICD-10-CM

## 2022-02-07 DIAGNOSIS — R39.15 URGENCY OF URINATION: ICD-10-CM

## 2022-02-07 DIAGNOSIS — R05.9 COUGH: ICD-10-CM

## 2022-02-07 DIAGNOSIS — I10 ESSENTIAL HYPERTENSION: ICD-10-CM

## 2022-02-07 DIAGNOSIS — E87.8 ABNORMAL BLOOD ELECTROLYTE LEVEL: ICD-10-CM

## 2022-02-07 DIAGNOSIS — L98.9 SKIN LESION OF FACE: Primary | ICD-10-CM

## 2022-02-07 LAB
ANION GAP SERPL CALCULATED.3IONS-SCNC: 19 MMOL/L (ref 3–16)
BILIRUBIN URINE: ABNORMAL
BLOOD, URINE: NEGATIVE
BUN BLDV-MCNC: 13 MG/DL (ref 7–20)
CALCIUM SERPL-MCNC: 9 MG/DL (ref 8.3–10.6)
CHLORIDE BLD-SCNC: 101 MMOL/L (ref 99–110)
CLARITY: ABNORMAL
CO2: 19 MMOL/L (ref 21–32)
COLOR: YELLOW
COMMENT UA: ABNORMAL
CREAT SERPL-MCNC: 0.8 MG/DL (ref 0.6–1.2)
EPITHELIAL CELLS, UA: 9 /HPF (ref 0–5)
GFR AFRICAN AMERICAN: >60
GFR NON-AFRICAN AMERICAN: >60
GLUCOSE BLD-MCNC: 82 MG/DL (ref 70–99)
GLUCOSE URINE: NEGATIVE MG/DL
HYALINE CASTS: 5 /LPF (ref 0–8)
KETONES, URINE: NEGATIVE MG/DL
LEUKOCYTE ESTERASE, URINE: ABNORMAL
MICROSCOPIC EXAMINATION: YES
MUCUS: ABNORMAL /LPF
NITRITE, URINE: NEGATIVE
PH UA: 6 (ref 5–8)
POTASSIUM SERPL-SCNC: 4.4 MMOL/L (ref 3.5–5.1)
PROTEIN UA: 30 MG/DL
RBC UA: ABNORMAL /HPF (ref 0–4)
SODIUM BLD-SCNC: 139 MMOL/L (ref 136–145)
SPECIFIC GRAVITY UA: 1.02 (ref 1–1.03)
URINE TYPE: ABNORMAL
UROBILINOGEN, URINE: 0.2 E.U./DL
WBC UA: 7 /HPF (ref 0–5)

## 2022-02-07 PROCEDURE — G8484 FLU IMMUNIZE NO ADMIN: HCPCS | Performed by: FAMILY MEDICINE

## 2022-02-07 PROCEDURE — 71046 X-RAY EXAM CHEST 2 VIEWS: CPT

## 2022-02-07 PROCEDURE — G8427 DOCREV CUR MEDS BY ELIG CLIN: HCPCS | Performed by: FAMILY MEDICINE

## 2022-02-07 PROCEDURE — 2022F DILAT RTA XM EVC RTNOPTHY: CPT | Performed by: FAMILY MEDICINE

## 2022-02-07 PROCEDURE — 3046F HEMOGLOBIN A1C LEVEL >9.0%: CPT | Performed by: FAMILY MEDICINE

## 2022-02-07 PROCEDURE — 99214 OFFICE O/P EST MOD 30 MIN: CPT | Performed by: FAMILY MEDICINE

## 2022-02-07 PROCEDURE — 3017F COLORECTAL CA SCREEN DOC REV: CPT | Performed by: FAMILY MEDICINE

## 2022-02-07 PROCEDURE — G8420 CALC BMI NORM PARAMETERS: HCPCS | Performed by: FAMILY MEDICINE

## 2022-02-07 PROCEDURE — 4004F PT TOBACCO SCREEN RCVD TLK: CPT | Performed by: FAMILY MEDICINE

## 2022-02-07 RX ORDER — FLUTICASONE PROPIONATE 50 MCG
1 SPRAY, SUSPENSION (ML) NASAL DAILY
Qty: 16 G | Refills: 0 | Status: SHIPPED | OUTPATIENT
Start: 2022-02-07 | End: 2022-07-26

## 2022-02-07 RX ORDER — PREDNISONE 10 MG/1
TABLET ORAL
Qty: 16 TABLET | Refills: 0 | Status: SHIPPED | OUTPATIENT
Start: 2022-02-07 | End: 2022-07-06 | Stop reason: ALTCHOICE

## 2022-02-07 NOTE — PATIENT INSTRUCTIONS
Do use the inhaler at home  Obtain a urine to check for infection  We may need to have a urologist see you  See the dermatologist office for a check on the nose.  Here in [place] 59 the steroid with food  See me in July

## 2022-02-07 NOTE — TELEPHONE ENCOUNTER
Pt was seen today and was told that a nasal spray and prednisone would be over at the pharmacy. Pt isnt for sure if Dr. Jacque Miller was going to order the nasal spray or was pt supposed to get an over the counter nasal spray?     Pl advise  [phone] (home)

## 2022-02-07 NOTE — PROGRESS NOTES
Subjective:      Patient ID: Zahraa Pickens is a 59 y.o. female. Chief Complaint   Patient presents with    Urinary Tract Infection     frequency x 4-5 days    Congestion     still with stuffy nose    Mass     spot on nose        Patient presents with:  Urinary Tract Infection: frequency x 4-5 days  Congestion: still with stuffy nose  Mass: spot on nose    Here fore the above  Urine urgency small amount for the last 4 days  No pain no blood  No abd pain  But feels pressure in the suprapubic area last night  No vaginal bleeding     Congestion in nose  Runny nose  Sneezing. Going on for 2-3 weeks  And no change  Using mucinex and also singulair    On the left bridge of the nose for at least 6 month  No itch no bleed. Can be tender especially when wearing glasses   No hx of skin ca    YOB: 1960    Date of Visit:  2/7/2022     -- Azithromycin -- Itching, Rash, Other (See Comments)                           and Swelling    --  Other reaction(s): Other (See Comments)             FEVER             FEVER   -- Baclofen -- Nausea And Vomiting, Itching and                            Rash   -- Nuts (Peanut Oil) -- Anaphylaxis   -- Peanut-Containing Drug Products -- Anaphylaxis   -- Sulfa Antibiotics -- Anaphylaxis and Rash   -- Adalimumab -- Other (See Comments)    --  \"immune system crashes\"   -- Avelox (Moxifloxacin Hcl In Nacl)    -- Bactrim    -- Belbuca (Buprenorphine Hcl) -- Itching and Other (See Comments)    --  Headaches, insomina   -- Infliximab -- Other (See Comments)    --  \"immune system crashes\"   -- Lisinopril -- Other (See Comments)    --  Cough   -- Zithromax (Azithromycin Dihydrate)    -- Adhesive Tape -- Rash    --  PAPER TAPE IS OK             PAPER TAPE IS OK   -- Moxifloxacin -- Rash, Other (See Comments) and                            Swelling    --  Other reaction(s):  Other (See Comments)             FEVER             FEVER             Other reaction(s): Fever   -- Sulfamethoxazole-Trimethoprim -- Other (See Comments), Rash and                            Swelling    --  Other reaction(s):  Other (See Comments)             FEVER             FEVER    Current Outpatient Medications:  carvedilol (COREG) 25 MG tablet, TAKE 1 TABLET TWICE DAILY, Disp: 90 tablet, Rfl: 3  cyclobenzaprine (FLEXERIL) 10 MG tablet, TAKE 1 TABLET TWICE DAILY AS NEEDED FOR MUSCLE SPASMS, Disp: 180 tablet, Rfl: 1  losartan (COZAAR) 100 MG tablet, TAKE 1 TABLET EVERY DAY, Disp: 90 tablet, Rfl: 3  atorvastatin (LIPITOR) 20 MG tablet, TAKE 1 TABLET EVERY DAY, Disp: 90 tablet, Rfl: 1  albuterol sulfate  (90 Base) MCG/ACT inhaler, INHALE 2 PUFFS INTO THE LUNGS EVERY 6 HOURS AS NEEDED FOR WHEEZING OR SHORTNESS OF BREATH OR COUGH, Disp: 54 g, Rfl: 1  montelukast (SINGULAIR) 10 MG tablet, TAKE 1 TABLET EVERY DAY, Disp: 90 tablet, Rfl: 1  metFORMIN (GLUCOPHAGE-XR) 500 MG extended release tablet, TAKE 1 TABLET TWICE DAILY, Disp: 180 tablet, Rfl: 2  sulindac (CLINORIL) 200 MG tablet, TAKE 1 TABLET TWICE DAILY, Disp: 180 tablet, Rfl: 1  blood glucose test strips (ACCU-CHEK SMARTVIEW) strip, TEST  ONE  TIME  DAILY, Disp: 100 strip, Rfl: 3  Blood Glucose Monitoring Suppl JOSSELYN, 1 each by Does not apply route daily, Disp: 1 Device, Rfl: 0  traZODone (DESYREL) 50 MG tablet, Take 100 mg by mouth nightly Take 100mg at night, Disp: , Rfl: 2  ALPRAZolam (XANAX PO), Take by mouth, Disp: , Rfl:   venlafaxine (EFFEXOR XR) 150 MG extended release capsule, TAKE 1 CAPSULE EVERY DAY, Disp: 90 capsule, Rfl: 1  albuterol (PROVENTIL) (2.5 MG/3ML) 0.083% nebulizer solution, Use one vial every 6 hours in nebulizer as needed, Disp: 120 vial, Rfl: 2  ACCU-CHEK FASTCLIX LANCETS [facility], TEST ONE TIME DAILY, Disp: 102 each, Rfl: 3     No current facility-administered medications for this visit.      ---------------------------               02/07/22                      1343         ---------------------------   BP: 130/84         Site:    Left Upper Arm     Position:     Sitting        Cuff Size:  Medium Adult      Temp:   97.1 °F (36.2 °C)   TempSrc:    Temporal        Weight: 148 lb (67.1 kg)    Height:  5' 6\" (1.676 m)   ---------------------------  Body mass index is 23.89 kg/m². Wt Readings from Last 3 Encounters:  02/07/22 : 148 lb (67.1 kg)  01/20/22 : 150 lb (68 kg)  01/05/22 : 148 lb (67.1 kg)    BP Readings from Last 3 Encounters:  02/07/22 : 130/84  01/20/22 : 138/84  01/05/22 : 120/84                  Review of Systems    Objective:   Physical Exam  Constitutional:       General: She is not in acute distress. Appearance: Normal appearance. She is well-developed. She is not ill-appearing or diaphoretic. HENT:      Head: Normocephalic and atraumatic. Right Ear: Tympanic membrane and ear canal normal.      Left Ear: Tympanic membrane and ear canal normal.      Nose: Congestion present. Right Sinus: Maxillary sinus tenderness present. Left Sinus: Maxillary sinus tenderness present. Mouth/Throat:      Lips: Pink. Mouth: Mucous membranes are moist. No oral lesions. Pharynx: Oropharynx is clear. Uvula midline. Pulmonary:      Effort: Pulmonary effort is normal. No tachypnea, accessory muscle usage or respiratory distress. Breath sounds: Wheezing present. No decreased breath sounds, rhonchi or rales. Comments: Low pitch wheezing upper posterior lungs both sides  High pitch at both bases   Chest:   Breasts:      Right: No supraclavicular adenopathy. Left: No supraclavicular adenopathy. Abdominal:      General: Bowel sounds are normal. There is no abdominal bruit. Palpations: Abdomen is soft. There is no hepatomegaly, splenomegaly, mass or pulsatile mass. Tenderness: There is no abdominal tenderness. There is no guarding. Musculoskeletal:      Cervical back: Neck supple.    Lymphadenopathy:      Head:      Right side of head:

## 2022-02-07 NOTE — TELEPHONE ENCOUNTER
meds sent  Orders Placed This Encounter   Medications    fluticasone (FLONASE) 50 MCG/ACT nasal spray     Si spray by Each Nostril route daily     Dispense:  16 g     Refill:  0

## 2022-02-08 LAB
ESTIMATED AVERAGE GLUCOSE: 102.5 MG/DL
HBA1C MFR BLD: 5.2 %

## 2022-02-08 RX ORDER — MONTELUKAST SODIUM 10 MG/1
TABLET ORAL
Qty: 90 TABLET | Refills: 1 | Status: SHIPPED | OUTPATIENT
Start: 2022-02-08 | End: 2022-08-29

## 2022-02-09 LAB
ORGANISM: ABNORMAL
URINE CULTURE, ROUTINE: ABNORMAL

## 2022-02-10 ENCOUNTER — TELEPHONE (OUTPATIENT)
Dept: FAMILY MEDICINE CLINIC | Age: 62
End: 2022-02-10

## 2022-02-10 RX ORDER — NITROFURANTOIN 25; 75 MG/1; MG/1
100 CAPSULE ORAL 2 TIMES DAILY
Qty: 14 CAPSULE | Refills: 0 | Status: SHIPPED | OUTPATIENT
Start: 2022-02-10 | End: 2022-02-17

## 2022-02-10 NOTE — TELEPHONE ENCOUNTER
----- Message from Leatha Flor sent at 2/10/2022  1:59 PM EST -----  Subject: Message to Provider    QUESTIONS  Information for Provider? Patient saw her latest test results and has   questions about the urine culture, requesting a nurse call her back today   to discuss. ---------------------------------------------------------------------------  --------------  Cindy Masters TORI  What is the best way for the office to contact you? OK to leave message on   voicemail  Preferred Call Back Phone Number? [phone]  ---------------------------------------------------------------------------  --------------  SCRIPT ANSWERS  Relationship to Patient?  Self

## 2022-02-10 NOTE — TELEPHONE ENCOUNTER
Spoke to patient earlier regarding labs - not sure if she called back after this.   [phone] - No Answer

## 2022-02-28 RX ORDER — SULINDAC 200 MG/1
TABLET ORAL
Qty: 180 TABLET | Refills: 1 | Status: SHIPPED | OUTPATIENT
Start: 2022-02-28 | End: 2022-07-25

## 2022-03-24 RX ORDER — METFORMIN HYDROCHLORIDE 500 MG/1
TABLET, EXTENDED RELEASE ORAL
Qty: 180 TABLET | Refills: 2 | Status: SHIPPED | OUTPATIENT
Start: 2022-03-24

## 2022-04-05 ENCOUNTER — HOSPITAL ENCOUNTER (OUTPATIENT)
Dept: NON INVASIVE DIAGNOSTICS | Age: 62
Discharge: HOME | End: 2022-04-05
Payer: MEDICARE

## 2022-04-05 DIAGNOSIS — I35.1 NON-RHEUMATIC AORTIC REGURGITATION: ICD-10-CM

## 2022-04-05 DIAGNOSIS — I35.8 AORTIC VALVE SCLEROSIS: ICD-10-CM

## 2022-04-05 DIAGNOSIS — I42.8 NICM (NONISCHEMIC CARDIOMYOPATHY) (HCC): ICD-10-CM

## 2022-04-05 LAB
LV EF: 53 %
LVEF MODALITY: NORMAL

## 2022-04-05 PROCEDURE — 93306 TTE W/DOPPLER COMPLETE: CPT

## 2022-04-06 ENCOUNTER — OFFICE VISIT (OUTPATIENT)
Dept: CARDIOLOGY CLINIC | Age: 62
End: 2022-04-06
Payer: MEDICARE

## 2022-04-06 VITALS
SYSTOLIC BLOOD PRESSURE: 126 MMHG | WEIGHT: 150 LBS | HEART RATE: 86 BPM | DIASTOLIC BLOOD PRESSURE: 74 MMHG | HEIGHT: 66 IN | BODY MASS INDEX: 24.11 KG/M2 | RESPIRATION RATE: 16 BRPM

## 2022-04-06 DIAGNOSIS — I42.8 NICM (NONISCHEMIC CARDIOMYOPATHY) (HCC): Primary | ICD-10-CM

## 2022-04-06 DIAGNOSIS — R06.09 DOE (DYSPNEA ON EXERTION): ICD-10-CM

## 2022-04-06 DIAGNOSIS — Z72.0 NICOTINE ABUSE: ICD-10-CM

## 2022-04-06 DIAGNOSIS — I10 ESSENTIAL HYPERTENSION: ICD-10-CM

## 2022-04-06 DIAGNOSIS — E78.2 MIXED HYPERLIPIDEMIA: ICD-10-CM

## 2022-04-06 DIAGNOSIS — I35.1 NON-RHEUMATIC AORTIC REGURGITATION: ICD-10-CM

## 2022-04-06 PROCEDURE — 3017F COLORECTAL CA SCREEN DOC REV: CPT | Performed by: INTERNAL MEDICINE

## 2022-04-06 PROCEDURE — 99214 OFFICE O/P EST MOD 30 MIN: CPT | Performed by: INTERNAL MEDICINE

## 2022-04-06 PROCEDURE — G8427 DOCREV CUR MEDS BY ELIG CLIN: HCPCS | Performed by: INTERNAL MEDICINE

## 2022-04-06 PROCEDURE — 4004F PT TOBACCO SCREEN RCVD TLK: CPT | Performed by: INTERNAL MEDICINE

## 2022-04-06 PROCEDURE — G8420 CALC BMI NORM PARAMETERS: HCPCS | Performed by: INTERNAL MEDICINE

## 2022-04-06 RX ORDER — BLOOD SUGAR DIAGNOSTIC
STRIP MISCELLANEOUS
Qty: 100 STRIP | Refills: 3 | Status: SHIPPED | OUTPATIENT
Start: 2022-04-06

## 2022-04-06 NOTE — PATIENT INSTRUCTIONS
Patient Education        DASH Diet: Care Instructions  Your Care Instructions     The DASH diet is an eating plan that can help lower your blood pressure. DASH stands for Dietary Approaches to Stop Hypertension. Hypertension is high bloodpressure. The DASH diet focuses on eating foods that are high in calcium, potassium, and magnesium. These nutrients can lower blood pressure. The foods that are highest in these nutrients are fruits, vegetables, low-fat dairy products, nuts, seeds, and legumes. But taking calcium, potassium, and magnesium supplements instead of eating foods that are high in those nutrients does not have the same effect. The DASH diet also includes whole grains, fish, and poultry. The DASH diet is one of several lifestyle changes your doctor may recommend to lower your high blood pressure. Your doctor may also want you to decrease the amount of sodium in your diet. Lowering sodium while following the DASH dietcan lower blood pressure even further than just the DASH diet alone. Follow-up care is a key part of your treatment and safety. Be sure to make and go to all appointments, and call your doctor if you are having problems. It's also a good idea to know your test results and keep alist of the medicines you take. How can you care for yourself at home? Following the DASH diet   Eat 4 to 5 servings of fruit each day. A serving is 1 medium-sized piece of fruit, ½ cup chopped or canned fruit, 1/4 cup dried fruit, or 4 ounces (½ cup) of fruit juice. Choose fruit more often than fruit juice.  Eat 4 to 5 servings of vegetables each day. A serving is 1 cup of lettuce or raw leafy vegetables, ½ cup of chopped or cooked vegetables, or 4 ounces (½ cup) of vegetable juice. Choose vegetables more often than vegetable juice.  Get 2 to 3 servings of low-fat and fat-free dairy each day. A serving is 8 ounces of milk, 1 cup of yogurt, or 1 ½ ounces of cheese.  Eat 6 to 8 servings of grains each day.  A serving is 1 slice of bread, 1 ounce of dry cereal, or ½ cup of cooked rice, pasta, or cooked cereal. Try to choose whole-grain products as much as possible.  Limit lean meat, poultry, and fish to 2 servings each day. A serving is 3 ounces, about the size of a deck of cards.  Eat 4 to 5 servings of nuts, seeds, and legumes (cooked dried beans, lentils, and split peas) each week. A serving is 1/3 cup of nuts, 2 tablespoons of seeds, or ½ cup of cooked beans or peas.  Limit fats and oils to 2 to 3 servings each day. A serving is 1 teaspoon of vegetable oil or 2 tablespoons of salad dressing.  Limit sweets and added sugars to 5 servings or less a week. A serving is 1 tablespoon jelly or jam, ½ cup sorbet, or 1 cup of lemonade.  Eat less than 2,300 milligrams (mg) of sodium a day. If you limit your sodium to 1,500 mg a day, you can lower your blood pressure even more.  Be aware that all of these are the suggested number of servings for people who eat 1,800 to 2,000 calories a day. Your recommended number of servings may be different if you need more or fewer calories. Tips for success   Start small. Do not try to make dramatic changes to your diet all at once. You might feel that you are missing out on your favorite foods and then be more likely to not follow the plan. Make small changes, and stick with them. Once those changes become habit, add a few more changes.  Try some of the following:  ? Make it a goal to eat a fruit or vegetable at every meal and at snacks. This will make it easy to get the recommended amount of fruits and vegetables each day. ? Try yogurt topped with fruit and nuts for a snack or healthy dessert. ? Add lettuce, tomato, cucumber, and onion to sandwiches. ? Combine a ready-made pizza crust with low-fat mozzarella cheese and lots of vegetable toppings. Try using tomatoes, squash, spinach, broccoli, carrots, cauliflower, and onions. ?  Have a variety of cut-up vegetables with a low-fat dip as an appetizer instead of chips and dip. ? Sprinkle sunflower seeds or chopped almonds over salads. Or try adding chopped walnuts or almonds to cooked vegetables. ? Try some vegetarian meals using beans and peas. Add garbanzo or kidney beans to salads. Make burritos and tacos with mashed ely beans or black beans. Where can you learn more? Go to https://CRAiLAR.[employer]. org and sign in to your [employer] account. Enter U600 in the [employer] box to learn more about \"DASH Diet: Care Instructions. \"     If you do not have an account, please click on the \"Sign Up Now\" link. Current as of: January 10, 2022               Content Version: 13.2  © 9485-8403 [employer]. Care instructions adapted under license by [facility] ([facility]). If you have questions about a medical condition or this instruction, always ask your healthcare professional. [address] any warranty or liability for your use of this information.

## 2022-04-06 NOTE — PROGRESS NOTES
[address]      Cardiology Consult    Jojo Solares  1960 April 6, 2022    Referring Physician: Dr. Lolis Tillman  Reason for Referral: cardiomyopathy    CC: \"My blood pressure fluctuates. \"     HPI:  The patient is 59 y.o. female with a past medical history significant for cardiomyopathy, nicotine addiction, and Type II DM presents for chronic management of a cardiomyopathy and aortic regurgitation. She had an echocardiogram ordered 1/2017 for a newly heard murmur. The echo report stated an EF of 40% with moderate aortic regurgitation. Her repeat echocardiogram 2/13/18 showed a recovered EF at 60% but her echo 4/2021 showed a mildly reduced EF at 45-50%. Her repeat echocardiogram 4/2022 showed the LVEF maybe slightly improved at 50-55% with moderate to severe aortic regurgitation. Today, she states overall she is feeling well but states her blood pressure continues to fluctuate. Her home log shows it averaging 130s/70s. She denies any new cardiac complaints. She denies any recurrent lightheadedness or hypotension. She reports chronic BUSTAMANTE that remains unchanged. She denies any weight gain or worsening LE edema. She continues to smoke. She denies any chest pains or worsening shortness of breath. She reports compliance with her medications and tolerating. She denies any abnormal bleeding or bruising. Patient denies exertional chest pain/pressure, dyspnea at rest, worsening BUSTAMANTE, PND, orthopnea, palpitations, lightheadedness, weight changes, changes in LE edema, and syncope.     Past Medical History:   Diagnosis Date    Anxiety     Arrhythmia     Asthma     Chronic back pain     Depression     Diabetes mellitus type II     Dyshidrotic dermatitis     Elevated blood pressure reading without diagnosis of hypertension     Essential hypertension 2/28/2018    Hyperlipidemia     Kidney stone     Liver disease     Lump or mass in breast     Orthostatic hypotension     Osteoarthritis rheumatoid arthritis    Panic disorder     Personal history of renal calculi     kidney stones [id number]    Pneumonia 2007    RA [facility]oid [facility]) [facility] [address]     chronic    Scoliosis     Substance abuse ([facility] [address]     Unspecified sleep apnea      Past Surgical History:   Procedure Laterality Date    APPENDECTOMY  1978    incidental    BREAST SURGERY  2002    breast tissue removed from right axillary     CATARACT REMOVAL Bilateral 10/2016    COLONOSCOPY  7.10.09    normal; Dr Joel Gitelman  08/23/2018    ok dr Angelina Paul repeat in 8 years    CYST REMOVAL  1979    cyst on left ovary -removal of left ovary    ECTOPIC PREGNANCY SURGERY  1978    removal of left tube     FOOT SURGERY  9/1998    B/L release of the fascia     HIP SURGERY      HYSTERECTOMY  1986    removal of uterus    JOINT REPLACEMENT  06/2021    right hip     SALPINGO-OOPHORECTOMY  [id number]    left ovary removed following cyst removal, right tube and ovary removed due to adhesion    SINUS SURGERY  1994    polyps in sinuses    SPINE SURGERY  11/2016    lumbar     UPPER GASTROINTESTINAL ENDOSCOPY  08/23/2018    dr Angelina Paul, recheck in 2 years     WRIST SURGERY  3/2003    repair of left wrist torn ligament-tenosynovitis release right      Family History   Problem Relation Age of Onset    Cancer Mother         breast cancer in her 48d   [facility] Diabetes Mother     High Blood Pressure Mother     Stroke Maternal Grandmother     Cancer Paternal Grandfather         bladder cancer    Diabetes Paternal Aunt     Diabetes Paternal Grandmother      Social History     Tobacco Use    Smoking status: Current Every Day Smoker     Packs/day: 0.75     Years: 30.00     Pack years: 22.50     Start date: 3/14/1975    Smokeless tobacco: Never Used    Tobacco comment: 6 cig daily    Vaping Use    Vaping Use: Not on file   Substance Use Topics    Alcohol use: No    Drug use: No     Allergies   Allergen Reactions    Azithromycin Itching, Rash, Other (See Comments) and Swelling     Other reaction(s): Other (See Comments)  FEVER  FEVER    Baclofen Nausea And Vomiting, Itching and Rash    Nuts [Peanut Oil] Anaphylaxis    Peanut-Containing Drug Products Anaphylaxis    Sulfa Antibiotics Anaphylaxis and Rash    Adalimumab Other (See Comments)     \"immune system crashes\"    Avelox [Moxifloxacin Hcl In Nacl]     Bactrim     Belbuca [Buprenorphine Hcl] Itching and Other (See Comments)     Headaches, insomina    Infliximab Other (See Comments)     \"immune system crashes\"    Lisinopril Other (See Comments)     Cough    Zithromax [Azithromycin Dihydrate]     Adhesive Tape Rash     PAPER TAPE IS OK  PAPER TAPE IS OK    Moxifloxacin Rash, Other (See Comments) and Swelling     Other reaction(s): Other (See Comments)  FEVER  FEVER  Other reaction(s): Fever    Sulfamethoxazole-Trimethoprim Other (See Comments), Rash and Swelling     Other reaction(s):  Other (See Comments)  FEVER  FEVER     Current Outpatient Medications   Medication Sig Dispense Refill    ACCU[employer] SMARTVIEW strip TEST  ONE  TIME  DAILY 100 strip 3    metFORMIN (GLUCOPHAGE-XR) 500 MG extended release tablet TAKE 1 TABLET TWICE DAILY 180 tablet 2    sulindac (CLINORIL) 200 MG tablet TAKE 1 TABLET TWICE DAILY 180 tablet 1    montelukast (SINGULAIR) 10 MG tablet TAKE 1 TABLET EVERY DAY 90 tablet 1    fluticasone (FLONASE) 50 MCG/ACT nasal spray 1 spray by Each Nostril route daily 16 g 0    carvedilol (COREG) 25 MG tablet TAKE 1 TABLET TWICE DAILY 90 tablet 3    cyclobenzaprine (FLEXERIL) 10 MG tablet TAKE 1 TABLET TWICE DAILY AS NEEDED FOR MUSCLE SPASMS 180 tablet 1    losartan (COZAAR) 100 MG tablet TAKE 1 TABLET EVERY DAY 90 tablet 3    atorvastatin (LIPITOR) 20 MG tablet TAKE 1 TABLET EVERY DAY 90 tablet 1    albuterol sulfate  (90 Base) MCG/ACT inhaler INHALE 2 PUFFS INTO THE LUNGS EVERY 6 HOURS AS NEEDED FOR WHEEZING OR SHORTNESS OF BREATH OR COUGH 54 g 1    Blood Glucose Monitoring Suppl JOSSELYN 1 each by Does not apply route daily 1 Device 0    traZODone (DESYREL) 50 MG tablet Take 100 mg by mouth nightly Take 100mg at night  2    ALPRAZolam (XANAX PO) Take by mouth      venlafaxine (EFFEXOR XR) 150 MG extended release capsule TAKE 1 CAPSULE EVERY DAY 90 capsule 1    albuterol (PROVENTIL) (2.5 MG/3ML) 0.083% nebulizer solution Use one vial every 6 hours in nebulizer as needed 120 vial 2    ACCU-CHEK FASTCLIX LANCETS MISC TEST ONE TIME DAILY 102 each 3    predniSONE (DELTASONE) 10 MG tablet Prednisone 10 mg. #16. Take 2 po bid for 2 days, then 1 po bid for 3 days,  then 1 po daily for 2 days. Then stop (Patient not taking: Reported on 4/6/2022) 16 tablet 0     No current facility-administered medications for this visit. Review of Systems:  · Constitutional: no unanticipated weight loss. There's been no change in energy level, sleep pattern, or activity level. No fevers, chills. · Eyes: No visual changes or diplopia. No scleral icterus. · ENT: No Headaches, hearing loss or vertigo. No mouth sores or sore throat. · Cardiovascular: as reviewed in HPI  · Respiratory: No cough or wheezing, no sputum production. No hematemesis. · Gastrointestinal: No abdominal pain, appetite loss, blood in stools. No change in bowel or bladder habits. · Genitourinary: No dysuria, trouble voiding, or hematuria. · Musculoskeletal:  No gait disturbance, no joint complaints. · Integumentary: No rash or pruritis. · Neurological: No headache, diplopia, change in muscle strength, numbness or tingling. · Psychiatric: No anxiety or depression. · Endocrine: No temperature intolerance. No excessive thirst, fluid intake, or urination. No tremor. · Hematologic/Lymphatic: No abnormal bruising or bleeding, blood clots or swollen lymph nodes. · Allergic/Immunologic: No nasal congestion or hives.     Physical Exam:   /74   Pulse 86   Resp 16   Ht 5' 6\" (1.676 m)   Wt 150 lb (68 kg)   BMI 24.21 kg/m² Wt Readings from Last 3 Encounters:   04/06/22 150 lb (68 kg)   02/07/22 148 lb (67.1 kg)   01/20/22 150 lb (68 kg)     Constitutional: She is oriented to person, place, and time. She appears well-developed and well-nourished. In no acute distress. Head: Normocephalic and atraumatic. Pupils equal and round. Neck: Neck supple. No JVP or carotid bruit appreciated. No mass and no thyromegaly present. No lymphadenopathy present. Cardiovascular: Regular rate. Normal heart sounds. Exam reveals no gallop and no friction rub. I/VI diastolic murmur heard. Pulmonary/Chest: Effort normal and breath sounds normal. No respiratory distress. No rhonchi or rales. Bilateral wheezing. Abdominal: Soft, non-tender. Bowel sounds are normal. She exhibits no organomegaly, mass or bruit. Extremities: No edema, cyanosis, or clubbing. Pulses are 2+ radial/carotid bilaterally. Neurological: No gross cranial nerve deficit. Coordination normal.   Skin: Skin is warm and dry. There is no rash or diaphoresis. Psychiatric: She has a normal mood and affect. Her speech is normal and behavior is normal.     Lab Review:   FLP:    Lab Results   Component Value Date    TRIG 167 07/21/2020    HDL 55 04/05/2021    LDLCALC 81 04/05/2021    LABVLDL 28 04/05/2021     BUN/Creatinine:    Lab Results   Component Value Date    BUN 13 02/07/2022    CREATININE 0.8 02/07/2022     EKG Interpretation: 9/23/16 NSR. Minimal voltage criteria for LVH.   11/28/18 Sinus tachycardia. Voltage criteria for LVH. Nonspecific ST-T abnormality. 3/24/21 Sinus tachycardia. Cannot rule out anterior infarct. Nonspecific ST-T abnormality. 1/5/22 Sinus rhythm. Poor R-wave progression. Nonspecific T-abnormality. Image Review:   GXT myoview 8/2013  Normal study except hypertensive response to exercise. Echo 1/27/17 personally reviewed  Overall heart function appears at the lower limits of normal to mildly reduced with an EF 50%.   The left atrium is normal in size.  There is mild aortic regurgitation. The right ventricle is normal in size and function. Echo 2/13/18 personally reviewed  Normal left ventricle size and systolic function with an estimated ejection fraction of 60-65%. No regional wall motion abnormalities are seen. Mild concentric left ventricular hypertrophy is noted. Diastolic filling parameters suggests grade I diastolic dysfunction. Mild mitral regurgitation is present. Mild to moderate aortic regurgitation is present. There is mild tricuspid regurgitation with RVSP estimated at 26 mmHg.     Echo 4/9/21   Overall left ventricular systolic function is mildly depressed. Ejection fraction is visually estimated to be 45-50%. E/e'= 10.7. There is mild diffuse hypokinesis. There is reversal of E/A inflow velocities across the mitral valve. Indeterminate diastolic function. Aortic valve appears sclerotic but opens adequately. Moderate aortic regurgitation. The 10-year ASCVD risk score (Kobe Muir, et al., 2013) is: 18.3%    Values used to calculate the score:      Age: 59 years      Sex: Female      Is Non- : No      Diabetic: Yes      Tobacco smoker: Yes      Systolic Blood Pressure: 679 mmHg      Is BP treated: Yes      HDL Cholesterol: 55 mg/dL      Total Cholesterol: 164 mg/dL     Echo 4/6/22   Mildly dilated left ventricular cavity size. LVEDD=5 cm, LVESD=3.78 cm  Mildly diminished left ventricular systolic function with an estimated ejection fraction of 50-55%. 3DEF=52%. Mild global hypokinesis. No evident regional wall motion abnormalities are visualized. Average global longitudinal strain= -14.5% (abnormal). Grade I diastolic dysfunction with normal LV filling pressures. The aortic valve appears sclerotic but opens well. Moderate-severe aortic regurgitation is present. Flow reversal is present in the descending aorta consistent with severe aortic regurgitation. No evidence of aortic stenosis.   The mitral valve is slightly thickened with normal excursion. Mild-moderate mitral regurgitation. Mild tricuspid regurgitation with a PASP estimated at 21 mmHg. Small anterior pericardial effusion. Assessment/Plan:   1) Nonischemic cardiomyopathy. EF 50-55%. Patient appears euvolemic and compensated. Lisinopril discontinued due to cough and hydrochlorothiazide discontinued with symptomatic hypotension. Continue losartan 100 mg daily and Coreg 25 mg BID. Instructed to call with any worsening symptoms. 2) Aortic regurgitation/sclerosis. Moderate per report (4/9/21). Repeat echocardiogram 4/2022 with some findings suggesting severe AI, other finding in the moderate range. Will arrange for a cardiac MRI at [facility] Discussed treatment options including observation versus surgical referral but will await MRI results. Will continue B-blocker for now despite possibly severe AI. 3) BUSTAMANTE. Reports chronic BUSTAMANTE that remains unchanged. Could be related to the valvular disease but patient is wheezing on exam today and continues to smoke. Appears euvolemic today. With long term smoking history, will arrange for PFTs to assess for any pulmonary disease. 4) Essential hypertension. Controlled and symptomatic hypotension has improved. Goal BP <130/80 with DM. Hydrochlorothiazide discontinued. Encouraged to monitor blood pressure at home. Continue losartan 100 mg daily and Coreg 25 mg BID. 5) Hyperlipidemia. Continue statin therapy. 6) Nicotine Addiction. Encouraged smoking cessation but patient is in the contemplative stage. 7) Diabetes Type II. Management per PCP, Hgb A1c 5.2. Follow up in 6 months, will call testing results. Thank you very much for allowing me to participate in the care of your patient. Please do not hesitate to contact me if you have any questions. Sincerely,  Troy Lopez.  Nat Pitts, [address] [address]  Ph: [phone]  Fax: [phone]-    This note was scribed in the presence of Dr Samira Beyer MD by Shonda Cage RN. Physician Attestation: The scribes documentation has been prepared under my direction and personally reviewed by me in its entirety. I confirm that the note above accurately reflects all work, treatment, procedures, and medical decision making performed by me. All portions of the note including but not limited to the chief complaint, history of present illness, physical exam, assessment and plan/medical decision making were personally reviewed, edited, and updated on the day of the visit.

## 2022-05-04 ENCOUNTER — HOSPITAL ENCOUNTER (OUTPATIENT)
Dept: MRI IMAGING | Age: 62
Discharge: HOME | End: 2022-05-04
Payer: MEDICARE

## 2022-05-04 ENCOUNTER — HOSPITAL ENCOUNTER (OUTPATIENT)
Dept: PULMONOLOGY | Age: 62
Discharge: HOME | End: 2022-05-04
Payer: MEDICARE

## 2022-05-04 DIAGNOSIS — I42.8 NICM (NONISCHEMIC CARDIOMYOPATHY) (HCC): ICD-10-CM

## 2022-05-04 DIAGNOSIS — R06.09 DOE (DYSPNEA ON EXERTION): ICD-10-CM

## 2022-05-04 DIAGNOSIS — Z72.0 NICOTINE ABUSE: ICD-10-CM

## 2022-05-04 DIAGNOSIS — I35.1 NON-RHEUMATIC AORTIC REGURGITATION: ICD-10-CM

## 2022-05-04 PROCEDURE — A9585 GADOBUTROL INJECTION: HCPCS | Performed by: INTERNAL MEDICINE

## 2022-05-04 PROCEDURE — 94726 PLETHYSMOGRAPHY LUNG VOLUMES: CPT

## 2022-05-04 PROCEDURE — 94664 DEMO&/EVAL PT USE INHALER: CPT

## 2022-05-04 PROCEDURE — 94729 DIFFUSING CAPACITY: CPT

## 2022-05-04 PROCEDURE — 94060 EVALUATION OF WHEEZING: CPT

## 2022-05-04 PROCEDURE — 75565 CARD MRI VELOC FLOW MAPPING: CPT

## 2022-05-04 PROCEDURE — 94761 N-INVAS EAR/PLS OXIMETRY MLT: CPT

## 2022-05-04 PROCEDURE — 6360000002 HC RX W HCPCS: Performed by: INTERNAL MEDICINE

## 2022-05-04 PROCEDURE — 6360000004 HC RX CONTRAST MEDICATION: Performed by: INTERNAL MEDICINE

## 2022-05-04 RX ORDER — ALBUTEROL SULFATE 2.5 MG/3ML
2.5 SOLUTION RESPIRATORY (INHALATION) ONCE
Status: COMPLETED | OUTPATIENT
Start: 2022-05-04 | End: 2022-05-04

## 2022-05-04 RX ADMIN — GADOBUTROL 10 ML: 604.72 INJECTION INTRAVENOUS at 15:17

## 2022-05-04 RX ADMIN — ALBUTEROL SULFATE 2.5 MG: 2.5 SOLUTION RESPIRATORY (INHALATION) at 16:20

## 2022-05-05 LAB
LV EF: 60 %
LVEF MODALITY: NORMAL

## 2022-05-09 RX ORDER — ATORVASTATIN CALCIUM 20 MG/1
TABLET, FILM COATED ORAL
Qty: 90 TABLET | Refills: 1 | Status: SHIPPED | OUTPATIENT
Start: 2022-05-09

## 2022-05-11 NOTE — PROCEDURES
Pulmonary Function Test:     Indication: Dyspnea on exertion    Smoked for 30 years    Test comment:     Spirometry data is acceptable and reproducible. Pulse ox is 97% on room air    Estimated body mass index is 24.21 kg/m² as calculated from the following:    Height as of 4/6/22: 5' 6\" (1.676 m). Weight as of 4/6/22: 150 lb (68 kg). Spirometry data:    FEV1/FVC: 67. Predicted ratio 77    Pre-Bronchodilator FEV1 1.95L, which is 72% predicted    Post-Bronchodilator FEV1 1.94L, which is 71% predicted    There is 0% reversibility     FVC is 2.94L, which is 83% predicted    Lung Volumes:    TLC (by Plethysmography) is 5.07L, which is 93% predicted    RV is 2. 22L which is 101% predicted    Diffusion Capacity:    DLCO is 15.65 which is 60% predicted    Impression:    1. There is moderate obstruction present    2. There is no significant reversibility with bronchodilator        [Increase in FEV1 => 12% of control and => 200 ml]    3. There is no significant hyperinflation or air trapping    4. There is mild reduction in diffusion capacity    Comment:   PFT findings are consistent with moderate COPD. Clinical correlation is recommended.

## 2022-05-12 DIAGNOSIS — R06.09 DOE (DYSPNEA ON EXERTION): Primary | ICD-10-CM

## 2022-05-12 DIAGNOSIS — R94.2 ABNORMAL PFT: ICD-10-CM

## 2022-06-02 RX ORDER — CYCLOBENZAPRINE HCL 10 MG
TABLET ORAL
Qty: 180 TABLET | Refills: 1 | Status: SHIPPED | OUTPATIENT
Start: 2022-06-02

## 2022-06-03 ENCOUNTER — HOSPITAL ENCOUNTER (OUTPATIENT)
Dept: WOMENS IMAGING | Age: 62
Discharge: HOME | End: 2022-06-03
Payer: MEDICARE

## 2022-06-03 VITALS — HEIGHT: 66 IN | BODY MASS INDEX: 24.11 KG/M2 | WEIGHT: 150 LBS

## 2022-06-03 DIAGNOSIS — Z12.31 VISIT FOR SCREENING MAMMOGRAM: ICD-10-CM

## 2022-06-03 PROCEDURE — 77063 BREAST TOMOSYNTHESIS BI: CPT

## 2022-06-15 RX ORDER — CARVEDILOL 25 MG/1
TABLET ORAL
Qty: 180 TABLET | Refills: 3 | Status: SHIPPED | OUTPATIENT
Start: 2022-06-15

## 2022-06-15 NOTE — TELEPHONE ENCOUNTER
Last OV: 4/6/22  Next OV: 10/12/22  Last refill:1/19/22  Most recent Labs: 2/7/22  Last EKG (if needed):1/5/22

## 2022-06-27 ENCOUNTER — TELEPHONE (OUTPATIENT)
Dept: FAMILY MEDICINE CLINIC | Age: 62
End: 2022-06-27

## 2022-06-27 DIAGNOSIS — G89.29 HIP PAIN, CHRONIC, RIGHT: ICD-10-CM

## 2022-06-27 DIAGNOSIS — G89.29 CHRONIC BILATERAL LOW BACK PAIN WITHOUT SCIATICA: Primary | ICD-10-CM

## 2022-06-27 DIAGNOSIS — M54.50 CHRONIC BILATERAL LOW BACK PAIN WITHOUT SCIATICA: Primary | ICD-10-CM

## 2022-06-27 DIAGNOSIS — M25.551 HIP PAIN, CHRONIC, RIGHT: ICD-10-CM

## 2022-06-27 NOTE — TELEPHONE ENCOUNTER
referral    Gunner Somers \"Jacquelyn\"  Kem Sandifer, MD 8 minutes ago (11:18 AM)     I need a referral to the [address] [facility] pain clinic for my left side back and hip pain, please     Nivia Fine

## 2022-06-27 NOTE — TELEPHONE ENCOUNTER
[address] [address]e Murphy  I assume this is ongoing problems and that she has seen pain doctor for this in the past?

## 2022-06-27 NOTE — TELEPHONE ENCOUNTER
Lieutenant Burrows \"Jacquelyn\"  Vini Phoenix MD Just now (1:26 PM)     [facility], can't find fax number but here is their phone number  [phone]

## 2022-07-06 ENCOUNTER — OFFICE VISIT (OUTPATIENT)
Dept: PULMONOLOGY | Age: 62
End: 2022-07-06
Payer: MEDICARE

## 2022-07-06 VITALS
TEMPERATURE: 98.3 F | OXYGEN SATURATION: 97 % | WEIGHT: 153.8 LBS | BODY MASS INDEX: 24.72 KG/M2 | SYSTOLIC BLOOD PRESSURE: 144 MMHG | HEART RATE: 84 BPM | RESPIRATION RATE: 16 BRPM | HEIGHT: 66 IN | DIASTOLIC BLOOD PRESSURE: 80 MMHG

## 2022-07-06 DIAGNOSIS — J44.9 CHRONIC OBSTRUCTIVE PULMONARY DISEASE, UNSPECIFIED COPD TYPE (HCC): Primary | ICD-10-CM

## 2022-07-06 DIAGNOSIS — Z87.891 PERSONAL HISTORY OF TOBACCO USE: ICD-10-CM

## 2022-07-06 PROCEDURE — 3017F COLORECTAL CA SCREEN DOC REV: CPT | Performed by: INTERNAL MEDICINE

## 2022-07-06 PROCEDURE — 4004F PT TOBACCO SCREEN RCVD TLK: CPT | Performed by: INTERNAL MEDICINE

## 2022-07-06 PROCEDURE — G8427 DOCREV CUR MEDS BY ELIG CLIN: HCPCS | Performed by: INTERNAL MEDICINE

## 2022-07-06 PROCEDURE — 99204 OFFICE O/P NEW MOD 45 MIN: CPT | Performed by: INTERNAL MEDICINE

## 2022-07-06 PROCEDURE — G8420 CALC BMI NORM PARAMETERS: HCPCS | Performed by: INTERNAL MEDICINE

## 2022-07-06 PROCEDURE — G0296 VISIT TO DETERM LDCT ELIG: HCPCS | Performed by: INTERNAL MEDICINE

## 2022-07-06 PROCEDURE — 3023F SPIROM DOC REV: CPT | Performed by: INTERNAL MEDICINE

## 2022-07-06 ASSESSMENT — COPD QUESTIONNAIRES
QUESTION6_LEAVINGHOUSE: 1
QUESTION7_SLEEPQUALITY: 2
QUESTION8_ENERGYLEVEL: 3
QUESTION3_CHESTTIGHTNESS: 3
QUESTION1_COUGHFREQUENCY: 5
QUESTION5_HOMEACTIVITIES: 3
QUESTION4_WALKINCLINE: 3
CAT_TOTALSCORE: 23
QUESTION2_CHESTPHLEGM: 3

## 2022-07-06 ASSESSMENT — ASTHMA QUESTIONNAIRES
QUESTION_2 LAST FOUR WEEKS HOW OFTEN HAVE YOU HAD SHORTNESS OF BREATH: 3
ACT_TOTALSCORE: 19
QUESTION_1 LAST FOUR WEEKS HOW MUCH OF THE TIME DID YOUR ASTHMA KEEP YOU FROM GETTING AS MUCH DONE AT WORK, SCHOOL OR AT HOME: 4
QUESTION_3 LAST FOUR WEEKS HOW OFTEN DID YOUR ASTHMA SYMPTOMS (WHEEZING, COUGHING, SHORTNESS OF BREATH, CHEST TIGHTNESS OR PAIN) WAKE YOU UP AT NIGHT OR EARLIER THAN USUAL IN THE MORNING: 3
QUESTION_5 LAST FOUR WEEKS HOW WOULD YOU RATE YOUR ASTHMA CONTROL: 4
QUESTION_4 LAST FOUR WEEKS HOW OFTEN HAVE YOU USED YOUR RESCUE INHALER OR NEBULIZER MEDICATION (SUCH AS ALBUTEROL): 5

## 2022-07-06 NOTE — PROGRESS NOTES
[address] [facility] (:  1960) is a 59 y.o. female,New patient, here for evaluation of the following chief complaint(s):  New Patient, COPD, and Asthma         ASSESSMENT/PLAN:  1. Chronic obstructive pulmonary disease, unspecified COPD type ([address]  Assessment & Plan:  - Patient reports history of lifelong asthma but PFTs reflect more of a fixed airflow obstruction without a significant bronchodilator response  -She is on albuterol as needed would recommend initiation of LAMA/LABA will provide Stiolto sample and Rx  Orders:  -     tiotropium-olodaterol (STIOLTO RESPIMAT) 2.5-2.5 MCG/ACT AERS; Inhale 2 puffs into the lungs daily, Disp-1 each, R-11Normal  2. Personal history of tobacco use  -     NM VISIT TO DISCUSS LUNG CA SCREEN W LDCT  -     CT Lung Screen (Annual); Future      No follow-ups on file. Future Appointments   Date Time Provider Shola Dee   2022  1:15 PM Rom Westfall MD [facility]   7/15/2022  4:00 PM F CT RM 1 [facility]   2022  1:20 PM Rohit Dudley MD [facility]   10/12/2022  1:00 PM [facility]            Subjective   SUBJECTIVE/OBJECTIVE:  58-year-old current everyday smoker with approximately 30-pack-year smoking history presents for evaluation of COPD seen on recent PFTs. Patient states that she had lifelong history of asthma and her brother had asthma. There were multiple hospitalizations when she was an infant but generally seems well controlled since that time. She has ongoing CAD/PAD work-up and as part of her work-up for dyspnea had PFTs that showed a moderate airflow obstructive pattern consistent with COPD. She was started on albuterol inhaler thinks that it may help some. She has no associated symptoms of productive cough but thinks maybe she has a wheeze occasionally. Review of Systems   Constitutional: Negative.     Respiratory: Positive for shortness of breath. Cardiovascular: Negative. Musculoskeletal: Negative. Neurological: Negative. Psychiatric/Behavioral: Negative. Objective   Physical Exam     Gen:  No acute distress. Eyes: PERRL. EOMI. Anicteric sclera. No conjunctival injection. ENT: No discharge. oropharynx clear. External appearance of ears and nose normal.  Neck: Trachea midline. No mass   Resp:  No crackles. No wheezes. No rhonchi. No dullness on percussion. CV: Regular rate. Regular rhythm. No murmur or rub. No edema. GI: Soft, Non-tender. Non-distended. +BS  Skin: Warm, dry, w/o erythema. Lymph: No cervical or supraclavicular LAD. M/S: No cyanosis. No clubbing. Neuro:  no focal neurologic deficit. Moves all extremities  Psych: Awake and alert, Oriented x 3. Judgement and insight appropriate. Mood stable. An electronic signature was used to authenticate this note. --Flavia Guevara MD   Low Dose CT (LDCT) Lung Screening criteria met:     Age 50-77(Medicare) or 50-80 (USPSTF)   Pack year smoking >20   Still smoking or less than 15 year since quit   No sign or symptoms of lung cancer   > 11 months since last LDCT     Risks and benefits of lung cancer screening with LDCT scans discussed:    Significance of positive screen - False-positive LDCT results often occur. 95% of all positive results do not lead to a diagnosis of cancer. Usually further imaging can resolve most false-positive results; however, some patients may require invasive procedures. Over diagnosis risk - 10% to 12% of screen-detected lung cancer cases are over diagnosed--that is, the cancer would not have been detected in the patient's lifetime without the screening.     Need for follow up screens annually to continue lung cancer screening effectiveness     Risks associated with radiation from annual LDCT- Radiation exposure is about the same as for a mammogram, which is about 1/3 of the annual background radiation exposure from

## 2022-07-07 ASSESSMENT — ENCOUNTER SYMPTOMS: SHORTNESS OF BREATH: 1

## 2022-07-07 NOTE — ASSESSMENT & PLAN NOTE
- Patient reports history of lifelong asthma but PFTs reflect more of a fixed airflow obstruction without a significant bronchodilator response  -She is on albuterol as needed would recommend initiation of LAMA/LABA will provide Stiolto sample and Rx

## 2022-07-08 ENCOUNTER — OFFICE VISIT (OUTPATIENT)
Dept: FAMILY MEDICINE CLINIC | Age: 62
End: 2022-07-08
Payer: MEDICARE

## 2022-07-08 VITALS
BODY MASS INDEX: 24.75 KG/M2 | DIASTOLIC BLOOD PRESSURE: 84 MMHG | WEIGHT: 154 LBS | TEMPERATURE: 97.3 F | SYSTOLIC BLOOD PRESSURE: 128 MMHG | HEIGHT: 66 IN | HEART RATE: 80 BPM

## 2022-07-08 DIAGNOSIS — M06.9 RHEUMATOID ARTHRITIS, INVOLVING UNSPECIFIED SITE, UNSPECIFIED WHETHER RHEUMATOID FACTOR PRESENT (HCC): ICD-10-CM

## 2022-07-08 DIAGNOSIS — E11.9 TYPE 2 DIABETES MELLITUS WITHOUT COMPLICATION, WITHOUT LONG-TERM CURRENT USE OF INSULIN (HCC): ICD-10-CM

## 2022-07-08 DIAGNOSIS — E78.2 MIXED HYPERLIPIDEMIA: Primary | ICD-10-CM

## 2022-07-08 PROCEDURE — 99214 OFFICE O/P EST MOD 30 MIN: CPT | Performed by: FAMILY MEDICINE

## 2022-07-08 PROCEDURE — G8420 CALC BMI NORM PARAMETERS: HCPCS | Performed by: FAMILY MEDICINE

## 2022-07-08 PROCEDURE — G8427 DOCREV CUR MEDS BY ELIG CLIN: HCPCS | Performed by: FAMILY MEDICINE

## 2022-07-08 PROCEDURE — 2022F DILAT RTA XM EVC RTNOPTHY: CPT | Performed by: FAMILY MEDICINE

## 2022-07-08 PROCEDURE — 3044F HG A1C LEVEL LT 7.0%: CPT | Performed by: FAMILY MEDICINE

## 2022-07-08 PROCEDURE — 3017F COLORECTAL CA SCREEN DOC REV: CPT | Performed by: FAMILY MEDICINE

## 2022-07-08 PROCEDURE — 4004F PT TOBACCO SCREEN RCVD TLK: CPT | Performed by: FAMILY MEDICINE

## 2022-07-08 SDOH — ECONOMIC STABILITY: FOOD INSECURITY: WITHIN THE PAST 12 MONTHS, THE FOOD YOU BOUGHT JUST DIDN'T LAST AND YOU DIDN'T HAVE MONEY TO GET MORE.: NEVER TRUE

## 2022-07-08 SDOH — ECONOMIC STABILITY: TRANSPORTATION INSECURITY
IN THE PAST 12 MONTHS, HAS LACK OF TRANSPORTATION KEPT YOU FROM MEETINGS, WORK, OR FROM GETTING THINGS NEEDED FOR DAILY LIVING?: NO

## 2022-07-08 SDOH — ECONOMIC STABILITY: FOOD INSECURITY: WITHIN THE PAST 12 MONTHS, YOU WORRIED THAT YOUR FOOD WOULD RUN OUT BEFORE YOU GOT MONEY TO BUY MORE.: NEVER TRUE

## 2022-07-08 ASSESSMENT — PATIENT HEALTH QUESTIONNAIRE - PHQ9
SUM OF ALL RESPONSES TO PHQ QUESTIONS 1-9: 0
SUM OF ALL RESPONSES TO PHQ9 QUESTIONS 1 & 2: 0
4. FEELING TIRED OR HAVING LITTLE ENERGY: 0
2. FEELING DOWN, DEPRESSED OR HOPELESS: 0
8. MOVING OR SPEAKING SO SLOWLY THAT OTHER PEOPLE COULD HAVE NOTICED. OR THE OPPOSITE, BEING SO FIGETY OR RESTLESS THAT YOU HAVE BEEN MOVING AROUND A LOT MORE THAN USUAL: 0
6. FEELING BAD ABOUT YOURSELF - OR THAT YOU ARE A FAILURE OR HAVE LET YOURSELF OR YOUR FAMILY DOWN: 0
SUM OF ALL RESPONSES TO PHQ QUESTIONS 1-9: 0
SUM OF ALL RESPONSES TO PHQ QUESTIONS 1-9: 0
5. POOR APPETITE OR OVEREATING: 0
SUM OF ALL RESPONSES TO PHQ QUESTIONS 1-9: 0
10. IF YOU CHECKED OFF ANY PROBLEMS, HOW DIFFICULT HAVE THESE PROBLEMS MADE IT FOR YOU TO DO YOUR WORK, TAKE CARE OF THINGS AT HOME, OR GET ALONG WITH OTHER PEOPLE: 0
9. THOUGHTS THAT YOU WOULD BE BETTER OFF DEAD, OR OF HURTING YOURSELF: 0
3. TROUBLE FALLING OR STAYING ASLEEP: 0
1. LITTLE INTEREST OR PLEASURE IN DOING THINGS: 0
7. TROUBLE CONCENTRATING ON THINGS, SUCH AS READING THE NEWSPAPER OR WATCHING TELEVISION: 0

## 2022-07-08 ASSESSMENT — ENCOUNTER SYMPTOMS
CONSTIPATION: 0
DIARRHEA: 0
BLOOD IN STOOL: 0
ABDOMINAL PAIN: 0

## 2022-07-08 ASSESSMENT — SOCIAL DETERMINANTS OF HEALTH (SDOH): HOW HARD IS IT FOR YOU TO PAY FOR THE VERY BASICS LIKE FOOD, HOUSING, MEDICAL CARE, AND HEATING?: NOT HARD AT ALL

## 2022-07-08 NOTE — PATIENT INSTRUCTIONS
Continue the diet  If the diabetes test remains good then I may have you stop the metformin  See in about 6 months

## 2022-07-08 NOTE — PROGRESS NOTES
Subjective:      Patient ID: Shante Erickson is a 59 y.o. female. Chief Complaint   Patient presents with    Check-Up     lipids, hypertension, diabetes        Patient presents with:  Check-Up: lipids, hypertension, diabetes    Here for the above  meds the same  Glucose ~100   Overall well     Updated   She is well no c/o  She does note some head congestion  No fever  No blood nasal   No cough blood    YOB: 1960    Date of Visit:  7/8/2022     -- Azithromycin -- Itching, Rash, Other (See Comments)                           and Swelling    --  Other reaction(s): Other (See Comments)             FEVER             FEVER   -- Baclofen -- Nausea And Vomiting, Itching and                            Rash   -- Nuts (Peanut Oil) -- Anaphylaxis   -- Peanut-Containing Drug Products -- Anaphylaxis   -- Sulfa Antibiotics -- Anaphylaxis and Rash   -- Adalimumab -- Other (See Comments)    --  \"immune system crashes\"   -- Avelox (Moxifloxacin Hcl In Nacl)    -- Bactrim    -- Belbuca (Buprenorphine Hcl) -- Itching and Other (See Comments)    --  Headaches, insomina   -- Infliximab -- Other (See Comments)    --  \"immune system crashes\"   -- Lisinopril -- Other (See Comments)    --  Cough   -- Zithromax (Azithromycin Dihydrate)    -- Adhesive Tape -- Rash    --  PAPER TAPE IS OK             PAPER TAPE IS OK   -- Moxifloxacin -- Rash, Other (See Comments) and                            Swelling    --  Other reaction(s): Other (See Comments)             FEVER             FEVER             Other reaction(s): Fever   -- Sulfamethoxazole-Trimethoprim -- Other (See Comments), Rash and                            Swelling    --  Other reaction(s):  Other (See Comments)             FEVER             FEVER    Current Outpatient Medications:  tiotropium-olodaterol (STIOLTO RESPIMAT) 2.5-2.5 MCG/ACT AERS, Inhale 2 puffs into the lungs daily, Disp: 1 each, Rfl: 11  carvedilol (COREG) 25 MG tablet, TAKE 1 TABLET TWICE DAILY, Disp: 180 tablet, Rfl: 3  cyclobenzaprine (FLEXERIL) 10 mg tablet, TAKE 1 TABLET TWICE DAILY AS NEEDED FOR MUSCLE SPASMS, Disp: 180 tablet, Rfl: 1  atorvastatin (LIPITOR) 20 MG tablet, TAKE 1 TABLET EVERY DAY, Disp: 90 tablet, Rfl: 1  ACCU-CHEK SMARTVIEW strip, TEST  ONE  TIME  DAILY, Disp: 100 strip, Rfl: 3  metFORMIN (GLUCOPHAGE-XR) 500 MG extended release tablet, TAKE 1 TABLET TWICE DAILY, Disp: 180 tablet, Rfl: 2  sulindac (CLINORIL) 200 MG tablet, TAKE 1 TABLET TWICE DAILY, Disp: 180 tablet, Rfl: 1  montelukast (SINGULAIR) 10 MG tablet, TAKE 1 TABLET EVERY DAY, Disp: 90 tablet, Rfl: 1  fluticasone (FLONASE) 50 MCG/ACT nasal spray, 1 spray by Each Nostril route daily, Disp: 16 g, Rfl: 0  losartan (COZAAR) 100 MG tablet, TAKE 1 TABLET EVERY DAY, Disp: 90 tablet, Rfl: 3  albuterol sulfate  (90 Base) MCG/ACT inhaler, INHALE 2 PUFFS INTO THE LUNGS EVERY 6 HOURS AS NEEDED FOR WHEEZING OR SHORTNESS OF BREATH OR COUGH, Disp: 54 g, Rfl: 1  Blood Glucose Monitoring Suppl JOSSELYN, 1 each by Does not apply route daily, Disp: 1 Device, Rfl: 0  traZODone (DESYREL) 50 MG tablet, Take 100 mg by mouth nightly Take 100mg at night, Disp: , Rfl: 2  ALPRAZolam (XANAX PO), Take by mouth, Disp: , Rfl:   venlafaxine (EFFEXOR XR) 150 MG extended release capsule, TAKE 1 CAPSULE EVERY DAY, Disp: 90 capsule, Rfl: 1  albuterol (PROVENTIL) (2.5 MG/3ML) 0.083% nebulizer solution, Use one vial every 6 hours in nebulizer as needed, Disp: 120 vial, Rfl: 2  ACCU-CHEK FASTCLIX LANCETS MISC, TEST ONE TIME DAILY, Disp: 102 each, Rfl: 3     No current facility-administered medications for this visit.      ---------------------------               07/08/22                      1307         ---------------------------   BP:          128/84         Site:    Left Upper Arm     Position:     Sitting        Cuff Size:  Medium Adult      Pulse:         80           Temp:   97.3 °F (36.3 °C)   TempSrc:    Temporal Weight: 154 lb (69.9 kg)    Height:  5' 6\" (1.676 m)   ---------------------------  Body mass index is 24.86 kg/m². Wt Readings from Last 3 Encounters:  07/08/22 : 154 lb (69.9 kg)  07/06/22 : 153 lb 12.8 oz (69.8 kg)  06/03/22 : 150 lb (68 kg)    BP Readings from Last 3 Encounters:  07/08/22 : 128/84  07/06/22 : (!) 144/80  04/06/22 : 126/74          Review of Systems   Constitutional: Negative for chills and fever. Cardiovascular: Negative for chest pain. Gastrointestinal: Negative for abdominal pain, blood in stool, constipation and diarrhea. Genitourinary: Negative for difficulty urinating. Musculoskeletal:        Feet ok    Neurological: Negative for headaches. Objective:   Physical Exam  Constitutional:       General: She is not in acute distress. Appearance: Normal appearance. She is well-developed. She is not ill-appearing or diaphoretic. Neck:      Thyroid: No thyroid mass or thyromegaly. Cardiovascular:      Rate and Rhythm: Normal rate and regular rhythm. Pulses:           Dorsalis pedis pulses are 2+ on the right side and 2+ on the left side. Posterior tibial pulses are 2+ on the right side and 2+ on the left side. Heart sounds: Normal heart sounds. No murmur heard. No friction rub. No gallop. Comments:   No edema legs  Pulmonary:      Effort: Pulmonary effort is normal. No tachypnea, accessory muscle usage or respiratory distress. Breath sounds: Normal breath sounds. No decreased breath sounds, wheezing, rhonchi or rales. Comments: Some low pitch insp wheezing in posterior lung   Chest:   Breasts:      Right: No supraclavicular adenopathy. Left: No supraclavicular adenopathy. Abdominal:      General: Bowel sounds are normal. There is no distension or abdominal bruit. Palpations: Abdomen is soft. There is no hepatomegaly, splenomegaly, mass or pulsatile mass. Tenderness: There is no abdominal tenderness.  There is no guarding. Musculoskeletal:      Cervical back: Neck supple. Comments: Feet are warm, pink, dry. No lesions and the monofilament is normal both sides. Lymphadenopathy:      Cervical: No cervical adenopathy. Upper Body:      Right upper body: No supraclavicular adenopathy. Left upper body: No supraclavicular adenopathy. Skin:     General: Skin is warm and dry. Coloration: Skin is not pale. Nails: There is no clubbing. Neurological:      General: No focal deficit present. Mental Status: She is alert. Assessment:        Diagnosis Orders   1. Mixed hyperlipidemia  Lipid Panel    Comprehensive Metabolic Panel   2. Type 2 diabetes mellitus without complication, without long-term current use of insulin ([facility])  Lipid Panel    Hemoglobin A1C    Comprehensive Metabolic Panel    HM DIABETES FOOT EXAM   3. Rheumatoid arthritis, involving unspecified site, unspecified whether rheumatoid factor present ([facility] [address]         Overall well  No change at this time  We may stop the metformin  She no longer see rheumatologist as she has been stable     She is asked to stop the tobacco   She will be getting screening ct of the chest     Lab Results   Component Value Date    LABA1C 5.2 02/07/2022     Lab Results   Component Value Date    .5 02/07/2022         Visit with pulmonary for the copd on 7/6/22 reviewed.  Placed on 5 [facility]  cardiology visit on 4/6/22 for the nonischemic cardiomyopathy       Plan:      Continue the diet  If the diabetes test remains good then I may have you stop the metformin  See in about 6 months         Michael Cruz MD

## 2022-07-15 ENCOUNTER — HOSPITAL ENCOUNTER (OUTPATIENT)
Age: 62
Discharge: HOME | End: 2022-07-15
Payer: MEDICARE

## 2022-07-15 ENCOUNTER — HOSPITAL ENCOUNTER (OUTPATIENT)
Dept: CT IMAGING | Age: 62
Discharge: HOME | End: 2022-07-15
Payer: MEDICARE

## 2022-07-15 DIAGNOSIS — Z87.891 PERSONAL HISTORY OF TOBACCO USE: ICD-10-CM

## 2022-07-15 DIAGNOSIS — E11.9 TYPE 2 DIABETES MELLITUS WITHOUT COMPLICATION, WITHOUT LONG-TERM CURRENT USE OF INSULIN (HCC): ICD-10-CM

## 2022-07-15 DIAGNOSIS — E78.2 MIXED HYPERLIPIDEMIA: ICD-10-CM

## 2022-07-15 LAB
A/G RATIO: 1.4 (ref 1.1–2.2)
ALBUMIN SERPL-MCNC: 3.9 G/DL (ref 3.4–5)
ALP BLD-CCNC: 141 U/L (ref 40–129)
ALT SERPL-CCNC: 10 U/L (ref 10–40)
ANION GAP SERPL CALCULATED.3IONS-SCNC: 11 MMOL/L (ref 3–16)
AST SERPL-CCNC: 15 U/L (ref 15–37)
BILIRUB SERPL-MCNC: <0.2 MG/DL (ref 0–1)
BUN BLDV-MCNC: 11 MG/DL (ref 7–20)
CALCIUM SERPL-MCNC: 9 MG/DL (ref 8.3–10.6)
CHLORIDE BLD-SCNC: 103 MMOL/L (ref 99–110)
CHOLESTEROL, TOTAL: 155 MG/DL (ref 0–199)
CO2: 24 MMOL/L (ref 21–32)
CREAT SERPL-MCNC: 0.7 MG/DL (ref 0.6–1.2)
GFR AFRICAN AMERICAN: >60
GFR NON-AFRICAN AMERICAN: >60
GLUCOSE BLD-MCNC: 149 MG/DL (ref 70–99)
HDLC SERPL-MCNC: 49 MG/DL (ref 40–60)
LDL CHOLESTEROL CALCULATED: 54 MG/DL
POTASSIUM SERPL-SCNC: 4.4 MMOL/L (ref 3.5–5.1)
SODIUM BLD-SCNC: 138 MMOL/L (ref 136–145)
TOTAL PROTEIN: 6.6 G/DL (ref 6.4–8.2)
TRIGL SERPL-MCNC: 262 MG/DL (ref 0–150)
VLDLC SERPL CALC-MCNC: 52 MG/DL

## 2022-07-15 PROCEDURE — 80061 LIPID PANEL: CPT

## 2022-07-15 PROCEDURE — 71271 CT THORAX LUNG CANCER SCR C-: CPT

## 2022-07-15 PROCEDURE — 36415 COLL VENOUS BLD VENIPUNCTURE: CPT

## 2022-07-15 PROCEDURE — 80053 COMPREHEN METABOLIC PANEL: CPT

## 2022-07-15 PROCEDURE — 83036 HEMOGLOBIN GLYCOSYLATED A1C: CPT

## 2022-07-16 LAB
ESTIMATED AVERAGE GLUCOSE: 119.8 MG/DL
HBA1C MFR BLD: 5.8 %

## 2022-07-19 ENCOUNTER — TELEPHONE (OUTPATIENT)
Dept: CASE MANAGEMENT | Age: 62
End: 2022-07-19

## 2022-07-19 NOTE — TELEPHONE ENCOUNTER
Annual lung screen on 7/15/22. LRAD2. Recommended screen in one year.   Will follow in the lung screening program.

## 2022-07-25 RX ORDER — SULINDAC 200 MG/1
TABLET ORAL
Qty: 180 TABLET | Refills: 1 | Status: SHIPPED | OUTPATIENT
Start: 2022-07-25

## 2022-07-26 RX ORDER — FLUTICASONE PROPIONATE 50 MCG
SPRAY, SUSPENSION (ML) NASAL
Qty: 16 G | Refills: 0 | Status: SHIPPED | OUTPATIENT
Start: 2022-07-26 | End: 2022-10-12

## 2022-08-15 RX ORDER — LOSARTAN POTASSIUM 100 MG/1
TABLET ORAL
Qty: 90 TABLET | Refills: 3 | Status: SHIPPED | OUTPATIENT
Start: 2022-08-15

## 2022-08-29 RX ORDER — MONTELUKAST SODIUM 10 MG/1
TABLET ORAL
Qty: 90 TABLET | Refills: 1 | Status: SHIPPED | OUTPATIENT
Start: 2022-08-29

## 2022-09-07 ENCOUNTER — TELEPHONE (OUTPATIENT)
Dept: PULMONOLOGY | Age: 62
End: 2022-09-07

## 2022-09-07 NOTE — TELEPHONE ENCOUNTER
Patient calls in today complaining about side effects she is experiencing with Stiolto, Cough dry throat , white tongue ( but not thrush ) , blurry vision, back pain and sore throat. She has always had a sensitivity to inhalers, and doesn't feel any will work for her. She has been on Theophyline 5 plus years ago and that  always worked well for her, before changing to [address] [place] [address] Way     Could that be an option as an alternative to try?     Please advise or send something else in

## 2022-09-22 ENCOUNTER — OFFICE VISIT (OUTPATIENT)
Dept: PULMONOLOGY | Age: 62
End: 2022-09-22
Payer: MEDICARE

## 2022-09-22 VITALS
HEART RATE: 85 BPM | TEMPERATURE: 97.6 F | WEIGHT: 162 LBS | HEIGHT: 66 IN | SYSTOLIC BLOOD PRESSURE: 120 MMHG | OXYGEN SATURATION: 96 % | DIASTOLIC BLOOD PRESSURE: 80 MMHG | RESPIRATION RATE: 21 BRPM | BODY MASS INDEX: 26.03 KG/M2

## 2022-09-22 DIAGNOSIS — Z87.891 PERSONAL HISTORY OF TOBACCO USE: ICD-10-CM

## 2022-09-22 DIAGNOSIS — J44.9 CHRONIC OBSTRUCTIVE PULMONARY DISEASE, UNSPECIFIED COPD TYPE (HCC): Primary | ICD-10-CM

## 2022-09-22 PROCEDURE — G8419 CALC BMI OUT NRM PARAM NOF/U: HCPCS | Performed by: INTERNAL MEDICINE

## 2022-09-22 PROCEDURE — 99214 OFFICE O/P EST MOD 30 MIN: CPT | Performed by: INTERNAL MEDICINE

## 2022-09-22 PROCEDURE — 3017F COLORECTAL CA SCREEN DOC REV: CPT | Performed by: INTERNAL MEDICINE

## 2022-09-22 PROCEDURE — 4004F PT TOBACCO SCREEN RCVD TLK: CPT | Performed by: INTERNAL MEDICINE

## 2022-09-22 PROCEDURE — 3023F SPIROM DOC REV: CPT | Performed by: INTERNAL MEDICINE

## 2022-09-22 PROCEDURE — G8427 DOCREV CUR MEDS BY ELIG CLIN: HCPCS | Performed by: INTERNAL MEDICINE

## 2022-09-22 ASSESSMENT — COPD QUESTIONNAIRES
QUESTION6_LEAVINGHOUSE: 2
QUESTION5_HOMEACTIVITIES: 3
QUESTION3_CHESTTIGHTNESS: 3
QUESTION1_COUGHFREQUENCY: 3
QUESTION8_ENERGYLEVEL: 3
QUESTION2_CHESTPHLEGM: 3
QUESTION4_WALKINCLINE: 3
QUESTION7_SLEEPQUALITY: 2
CAT_TOTALSCORE: 22

## 2022-09-22 ASSESSMENT — ENCOUNTER SYMPTOMS: SHORTNESS OF BREATH: 1

## 2022-09-22 NOTE — ASSESSMENT & PLAN NOTE
- PFTs with fixed airflow obstruction  -CT scan with mild centrilobular emphysema  -Not tolerate Stiolto we will try Bevespi, patient states she has $0 co-pay for this

## 2022-09-22 NOTE — PROGRESS NOTES
clear. External appearance of ears and nose normal.  Neck: Trachea midline. No mass   Resp:  No crackles. No wheezes. No rhonchi. No dullness on percussion. CV: Regular rate. Regular rhythm. No murmur or rub. No edema. GI: Soft, Non-tender. Non-distended. +BS  Skin: Warm, dry, w/o erythema. Lymph: No cervical or supraclavicular LAD. M/S: No cyanosis. No clubbing. Neuro:  no focal neurologic deficit. Moves all extremities  Psych: Awake and alert, Oriented x 3. Judgement and insight appropriate. Mood stable. CT chest images reviewed personally by me, interpretation as follows:  -Mild centrilobular emphysema upper lobe predominant. Punctate nodules bilaterally less than 4 mm in diameter. An electronic signature was used to authenticate this note.     --James Hernadez MD

## 2022-10-12 ENCOUNTER — OFFICE VISIT (OUTPATIENT)
Dept: CARDIOLOGY CLINIC | Age: 62
End: 2022-10-12
Payer: MEDICARE

## 2022-10-12 VITALS
HEIGHT: 66 IN | SYSTOLIC BLOOD PRESSURE: 132 MMHG | WEIGHT: 164 LBS | HEART RATE: 85 BPM | BODY MASS INDEX: 26.36 KG/M2 | DIASTOLIC BLOOD PRESSURE: 78 MMHG | OXYGEN SATURATION: 96 %

## 2022-10-12 DIAGNOSIS — E78.2 MIXED HYPERLIPIDEMIA: ICD-10-CM

## 2022-10-12 DIAGNOSIS — I25.84 CORONARY ARTERY CALCIFICATION: ICD-10-CM

## 2022-10-12 DIAGNOSIS — F17.218 CIGARETTE NICOTINE DEPENDENCE WITH OTHER NICOTINE-INDUCED DISORDER: ICD-10-CM

## 2022-10-12 DIAGNOSIS — I42.8 NICM (NONISCHEMIC CARDIOMYOPATHY) (HCC): Primary | ICD-10-CM

## 2022-10-12 DIAGNOSIS — I10 ESSENTIAL HYPERTENSION: ICD-10-CM

## 2022-10-12 DIAGNOSIS — I35.1 NON-RHEUMATIC AORTIC REGURGITATION: ICD-10-CM

## 2022-10-12 DIAGNOSIS — I25.10 CORONARY ARTERY CALCIFICATION: ICD-10-CM

## 2022-10-12 PROCEDURE — 4004F PT TOBACCO SCREEN RCVD TLK: CPT | Performed by: INTERNAL MEDICINE

## 2022-10-12 PROCEDURE — G8484 FLU IMMUNIZE NO ADMIN: HCPCS | Performed by: INTERNAL MEDICINE

## 2022-10-12 PROCEDURE — G8419 CALC BMI OUT NRM PARAM NOF/U: HCPCS | Performed by: INTERNAL MEDICINE

## 2022-10-12 PROCEDURE — 99214 OFFICE O/P EST MOD 30 MIN: CPT | Performed by: INTERNAL MEDICINE

## 2022-10-12 PROCEDURE — G8427 DOCREV CUR MEDS BY ELIG CLIN: HCPCS | Performed by: INTERNAL MEDICINE

## 2022-10-12 PROCEDURE — 3017F COLORECTAL CA SCREEN DOC REV: CPT | Performed by: INTERNAL MEDICINE

## 2022-10-12 NOTE — PROGRESS NOTES
[facility]      Cardiology Progress Note    Abimael Johnson  1960 October 12, 2022    Referring Physician: Dr. Meri Monreal  Reason for Referral: cardiomyopathy    CC: \"Breathing better since starting inhalers\"    HPI:  The patient is 59 y.o. female with a past medical history significant for cardiomyopathy, nicotine addiction/COPD, and Type II DM presents for chronic management of a cardiomyopathy and aortic regurgitation. She had an echocardiogram ordered 1/2017 for a newly heard murmur. The echo report stated an EF of 40% with moderate aortic regurgitation. Her repeat echocardiogram 2/13/18 showed a recovered EF at 60% but her echo 4/2021 showed a mildly reduced EF at 45-50%. Her repeat echocardiogram 4/2022 showed the LVEF maybe slightly improved at 50-55% with moderate to severe aortic regurgitation. She underwent a cardiac MRI which showed the AI was much less significant. PFTs confirmed COPD and she was referred to pulmonary. She notes improvement in BUSTAMANTE with inhalers but she continues to smoke. Today, she returns 6 month follow up. She denies any new cardiac sounding complaints. She has chronic BUSTAMANTE but denies progressive changes or associated chest pains. Patient reports compliance to her medications. Patient denies exertional chest pain/pressure, dyspnea at rest, PND, orthopnea, palpitations, lightheadedness, weight changes, changes in LE edema, and syncope.     Past Medical History:   Diagnosis Date    Anxiety     Arrhythmia     Asthma     Chronic back pain     Chronic obstructive pulmonary disease ([facility] [address] 07/06/2022    COPD (chronic obstructive pulmonary disease) ([facility] [address]     Depression     Diabetes mellitus type II     Dyshidrotic dermatitis     Elevated blood pressure reading without diagnosis of hypertension     Essential hypertension 02/28/2018    Hyperlipidemia     Kidney stone     Liver disease     Lump or mass in breast     Orthostatic hypotension     Osteoarthritis rheumatoid arthritis    Panic disorder     Personal history of renal calculi     kidney stones [id number]    Pneumonia 2007    RA (rheumatoid arthritis) ([facility] [address]     chronic    Scoliosis     Substance abuse ([facility] [address]     Unspecified sleep apnea      Past Surgical History:   Procedure Laterality Date    APPENDECTOMY  1978    incidental    BREAST SURGERY  2002    breast tissue removed from right axillary     CATARACT REMOVAL Bilateral 10/2016    COLONOSCOPY  7.10.09    normal; Dr Jordan Bell  08/23/2018    ok dr Leia Stevens repeat in 8 years    CYST REMOVAL  1979    cyst on left ovary -removal of left ovary    ECTOPIC PREGNANCY SURGERY  1978    removal of left tube     FOOT SURGERY  9/1998    B/L release of the fascia     HIP SURGERY      HYSTERECTOMY (CERVIX STATUS UNKNOWN)  1986    removal of uterus    JOINT REPLACEMENT  06/2021    right hip     SALPINGO-OOPHORECTOMY  2652,0463    left ovary removed following cyst removal, right tube and ovary removed due to adhesion    [address]    polyps in sinuses    SPINE SURGERY  11/2016    lumbar     UPPER GASTROINTESTINAL ENDOSCOPY  08/23/2018    dr Leia Stevens, recheck in 2 years     WRIST SURGERY  3/2003    repair of left wrist torn ligament-tenosynovitis release right      Family History   Problem Relation Age of Onset    Cancer Mother         breast cancer in her 48d    Diabetes Mother     High Blood Pressure Mother     Breast Cancer Mother     Stroke Maternal Grandmother     Cancer Paternal Grandfather         bladder cancer    Diabetes Paternal Aunt     Diabetes Paternal Grandmother      Social History     Tobacco Use    Smoking status: Every Day     Packs/day: 0.75     Years: 30.00     Pack years: 22.50     Types: Cigarettes     Start date: 3/14/1975    Smokeless tobacco: Never    Tobacco comments:     6 cig daily, cutting back   Substance Use Topics    Alcohol use: No    Drug use: No     Allergies   Allergen Reactions    Azithromycin Itching, Rash, Other (See Comments) and Swelling     Other reaction(s): Other (See Comments)  FEVER  FEVER    Baclofen Nausea And Vomiting, Itching and Rash    Nuts [Peanut Oil] Anaphylaxis    Peanut-Containing Drug Products Anaphylaxis    Sulfa Antibiotics Anaphylaxis and Rash    Adalimumab Other (See Comments)     \"immune system crashes\"    Avelox [Moxifloxacin Hcl In Nacl]     Bactrim     Belbuca [Buprenorphine Hcl] Itching and Other (See Comments)     Headaches, insomina    Infliximab Other (See Comments)     \"immune system crashes\"    Lisinopril Other (See Comments)     Cough    Zithromax [Azithromycin Dihydrate]     Adhesive Tape Rash     PAPER TAPE IS OK  PAPER TAPE IS OK    Moxifloxacin Rash, Other (See Comments) and Swelling     Other reaction(s): Other (See Comments)  FEVER  FEVER  Other reaction(s): Fever    Sulfamethoxazole-Trimethoprim Other (See Comments), Rash and Swelling     Other reaction(s):  Other (See Comments)  FEVER  FEVER     Current Outpatient Medications   Medication Sig Dispense Refill    glycopyrrolate-formoterol (BEVESPI AEROSPHERE) 9-4.8 MCG/ACT AERO Inhale 2 puffs into the lungs 2 times daily 3 each 3    montelukast (SINGULAIR) 10 MG tablet TAKE 1 TABLET EVERY DAY 90 tablet 1    losartan (COZAAR) 100 MG tablet TAKE 1 TABLET EVERY DAY 90 tablet 3    sulindac (CLINORIL) 200 MG tablet TAKE 1 TABLET TWICE DAILY 180 tablet 1    carvedilol (COREG) 25 MG tablet TAKE 1 TABLET TWICE DAILY 180 tablet 3    cyclobenzaprine (FLEXERIL) 10 mg tablet TAKE 1 TABLET TWICE DAILY AS NEEDED FOR MUSCLE SPASMS 180 tablet 1    atorvastatin (LIPITOR) 20 MG tablet TAKE 1 TABLET EVERY DAY 90 tablet 1    ACCU-CHEK SMARTVIEW strip TEST  ONE  TIME  DAILY 100 strip 3    metFORMIN (GLUCOPHAGE-XR) 500 MG extended release tablet TAKE 1 TABLET TWICE DAILY 180 tablet 2    albuterol sulfate  (90 Base) MCG/ACT inhaler INHALE 2 PUFFS INTO THE LUNGS EVERY 6 HOURS AS NEEDED FOR WHEEZING OR SHORTNESS OF BREATH OR COUGH 54 g 1    Blood Glucose Monitoring Suppl JOSSELYN 1 each by Does not apply route daily 1 Device 0    traZODone (DESYREL) 50 MG tablet Take 100 mg by mouth 2 times daily Take 2 tablets nightly  2    ALPRAZolam (XANAX PO) Take by mouth      venlafaxine (EFFEXOR XR) 150 MG extended release capsule TAKE 1 CAPSULE EVERY DAY 90 capsule 1    albuterol (PROVENTIL) (2.5 MG/3ML) 0.083% nebulizer solution Use one vial every 6 hours in nebulizer as needed 120 vial 2    ACCU-CHEK FASTCLIX LANCETS MISC TEST ONE TIME DAILY 102 each 3     No current facility-administered medications for this visit. Review of Systems:  Constitutional: no unanticipated weight loss. There's been no change in energy level, sleep pattern, or activity level. No fevers, chills. Eyes: No visual changes or diplopia. No scleral icterus. ENT: No Headaches, hearing loss or vertigo. No mouth sores or sore throat. Cardiovascular: as reviewed in HPI  Respiratory: No cough or wheezing, no sputum production. No hematemesis. Gastrointestinal: No abdominal pain, appetite loss, blood in stools. No change in bowel or bladder habits. Genitourinary: No dysuria, trouble voiding, or hematuria. Musculoskeletal:  No gait disturbance, no joint complaints. Integumentary: No rash or pruritis. Neurological: No headache, diplopia, change in muscle strength, numbness or tingling. Psychiatric: No anxiety or depression. Endocrine: No temperature intolerance. No excessive thirst, fluid intake, or urination. No tremor. Hematologic/Lymphatic: No abnormal bruising or bleeding, blood clots or swollen lymph nodes. Allergic/Immunologic: No nasal congestion or hives. Physical Exam:   /78   Pulse 85   Ht 5' 6\" (1.676 m)   Wt 164 lb (74.4 kg)   SpO2 96%   BMI 26.47 kg/m²   Wt Readings from Last 3 Encounters:   10/12/22 164 lb (74.4 kg)   09/22/22 162 lb (73.5 kg)   07/08/22 154 lb (69.9 kg)     Constitutional: She is oriented to person, place, and time.  She appears well-developed and well-nourished. In no acute distress. Head: Normocephalic and atraumatic. Pupils equal and round. Neck: Neck supple. No JVP or carotid bruit appreciated. No mass and no thyromegaly present. No lymphadenopathy present. Cardiovascular: Regular rate. Normal heart sounds. Exam reveals no gallop and no friction rub. I/VI diastolic murmur heard. Pulmonary/Chest: Effort normal and breath sounds normal. No respiratory distress. No rhonchi or rales. Bilateral wheezing. Abdominal: Soft, non-tender. Bowel sounds are normal. She exhibits no organomegaly, mass or bruit. Extremities: No edema, cyanosis, or clubbing. Pulses are 2+ radial/carotid bilaterally. Neurological: No gross cranial nerve deficit. Coordination normal.   Skin: Skin is warm and dry. There is no rash or diaphoresis. Psychiatric: She has a normal mood and affect. Her speech is normal and behavior is normal.     Lab Review:   Lab Results   Component Value Date/Time    TRIG 262 07/15/2022 03:45 PM    HDL 49 07/15/2022 03:45 PM    LDLCALC 54 07/15/2022 03:45 PM    LABVLDL 52 07/15/2022 03:45 PM     Lab Results   Component Value Date/Time    BUN 11 07/15/2022 03:45 PM    CREATININE 0.7 07/15/2022 03:45 PM     EKG Interpretation: 9/23/16 NSR. Minimal voltage criteria for LVH.   11/28/18 Sinus tachycardia. Voltage criteria for LVH. Nonspecific ST-T abnormality. 3/24/21 Sinus tachycardia. Cannot rule out anterior infarct. Nonspecific ST-T abnormality. 1/5/22 Sinus rhythm. Poor R-wave progression. Nonspecific T-abnormality. Image Review:   GXT myoview 8/2013  Normal study except hypertensive response to exercise. Echo 1/27/17 personally reviewed  Overall heart function appears at the lower limits of normal to mildly reduced with an EF 50%. The left atrium is normal in size. There is mild aortic regurgitation. The right ventricle is normal in size and function.     Echo 2/13/18 personally reviewed  Normal left ventricle size and systolic function with an estimated ejection fraction of 60-65%. No regional wall motion abnormalities are seen. Mild concentric left ventricular hypertrophy is noted. Diastolic filling parameters suggests grade I diastolic dysfunction. Mild mitral regurgitation is present. Mild to moderate aortic regurgitation is present. There is mild tricuspid regurgitation with RVSP estimated at 26 mmHg. Echo 4/9/21   Overall left ventricular systolic function is mildly depressed. Ejection fraction is visually estimated to be 45-50%. E/e'= 10.7. There is mild diffuse hypokinesis. There is reversal of E/A inflow velocities across the mitral valve. Indeterminate diastolic function. Aortic valve appears sclerotic but opens adequately. Moderate aortic regurgitation. Echo 4/6/22   Mildly dilated left ventricular cavity size. LVEDD=5 cm, LVESD=3.78 cm  Mildly diminished left ventricular systolic function with an estimated ejection fraction of 50-55%. 3DEF=52%. Mild global hypokinesis. No evident regional wall motion abnormalities are visualized. Average global longitudinal strain= -14.5% (abnormal). Grade I diastolic dysfunction with normal LV filling pressures. The aortic valve appears sclerotic but opens well. Moderate-severe aortic regurgitation is present. Flow reversal is present in the descending aorta consistent with severe aortic regurgitation. No evidence of aortic stenosis. The mitral valve is slightly thickened with normal excursion. Mild-moderate mitral regurgitation. Mild tricuspid regurgitation with a PASP estimated at 21 mmHg. Small anterior pericardial effusion. PFT 5/4/22  Impression:  1. There is moderate obstruction present  2. There is no significant reversibility with bronchodilator  [Increase in FEV1 => 12% of control and => 200 ml]  3. There is no significant hyperinflation or air trapping  4. There is mild reduction in diffusion capacity  Comment:   PFT findings are consistent with moderate COPD. Clinical correlation is recommended. Cardiac MRI 5/4/22  1. Aortic valvular regurgitation is demonstrated, regurgitant fraction estimated at 24%. 2. No evidence of abnormal myocardial signal. No delayed myocardial hyperenhancement/fibrosis. 3. Left ventricular ejection fraction lower limits normal, calculated at 48%, with otherwise normal wall motion       Assessment/Plan:   1) Nonischemic cardiomyopathy. EF 50%. Lisinopril discontinued due to cough and hydrochlorothiazide discontinued with symptomatic hypotension. Patient appears euvolemic and compensated. Continue losartan 100 mg daily and Coreg 25 mg BID. Will consider addition of SGLT2i at follow-up or clearly if worsening symptoms. 2) Aortic regurgitation/sclerosis. Repeat echocardiogram 4/2022 with some findings suggesting severe AI, other finding in the moderate range. 5/2022 MRI shows that left ventricular function is mildly reduced and the aortic regurgitation appears less significant than the echo suggested and is only mild to moderate (not moderate to severe). Continue to monitor. 3) COPD. PFTs completed 5/2022 revealing moderate COPD. Follow up with Dr. Lyndsay Damon Pulmonary 9/2022. Improvement on therapy initiated per Dr. Lyndsay Damon. Continues to smoke cigarettes and working to reduce. 4) Essential hypertension. Controlled and symptomatic hypotension has improved. Goal BP <130/80 with DM. Hydrochlorothiazide discontinued. Encouraged to monitor blood pressure at home. Continue losartan 100 mg daily and Coreg 25 mg BID. 5) Coronary artery calcification/aortic atherosclerosis/Hyperlipidemia. Continue statin therapy with Lipitor 20mg daily. No reported myalgias. 6) Nicotine Addiction. Encouraged smoking cessation but patient is trying to cut back. 7) Diabetes Type II. Management per PCP, Hgb A1c 5.2. Follow up in 6 months    Thank you very much for allowing me to participate in the care of your patient.  Please do not hesitate to contact me if you have any questions. Sincerely,  Tosha Cody. Gerson Javed, [address] [facility], [address] [place] [address], [place] [address]  Ph: [phone]  Fax: [phone]      This note was scribed in the presence of Dr. Edil Li MD by Georgina Elaine RN. Physician Attestation: The scribes documentation has been prepared under my direction and personally reviewed by me in its entirety. I confirm that the note above accurately reflects all work, treatment, procedures, and medical decision making performed by me. All portions of the note including but not limited to the chief complaint, history of present illness, physical exam, assessment and plan/medical decision making were personally reviewed, edited, and updated on the day of the visit.

## 2022-11-14 ENCOUNTER — TELEPHONE (OUTPATIENT)
Dept: PULMONOLOGY | Age: 62
End: 2022-11-14

## 2022-11-14 NOTE — TELEPHONE ENCOUNTER
Ila calls in from [address] [employer] regarding the Spiriva inhaler. Pharmacy wants to know if the patient is using both Bevespi AND Spiriva or if Spiriva is replacing Bevespi with Spiriva because they are both in the same class. Please advise.

## 2022-11-15 NOTE — TELEPHONE ENCOUNTER
[facility] has been notified of this information and all questions answered.

## 2023-01-19 ENCOUNTER — TELEPHONE (OUTPATIENT)
Dept: PULMONOLOGY | Age: 63
End: 2023-01-19

## 2023-01-19 DIAGNOSIS — J44.9 CHRONIC OBSTRUCTIVE PULMONARY DISEASE, UNSPECIFIED COPD TYPE (HCC): ICD-10-CM

## 2023-01-19 RX ORDER — METFORMIN HYDROCHLORIDE 500 MG/1
TABLET, EXTENDED RELEASE ORAL
Qty: 180 TABLET | Refills: 2 | Status: SHIPPED | OUTPATIENT
Start: 2023-01-19

## 2023-01-19 NOTE — TELEPHONE ENCOUNTER
Luis Enrique Watts from [address] [place] [address] calls in re: Spiriva. They filled and mailed it out to her. Jacquelyn called and said the unit that she received was defective, so they're sending her a replacement out. In the meatime Jacquelyn has asked if a prescription for 1 inhaler can be sent in to [facility] on Pending sale to [facility] in [facility].

## 2023-01-23 ENCOUNTER — OFFICE VISIT (OUTPATIENT)
Dept: FAMILY MEDICINE CLINIC | Age: 63
End: 2023-01-23
Payer: MEDICARE

## 2023-01-23 VITALS
HEART RATE: 72 BPM | DIASTOLIC BLOOD PRESSURE: 80 MMHG | WEIGHT: 164 LBS | BODY MASS INDEX: 26.36 KG/M2 | TEMPERATURE: 97 F | HEIGHT: 66 IN | SYSTOLIC BLOOD PRESSURE: 118 MMHG

## 2023-01-23 DIAGNOSIS — E11.9 TYPE 2 DIABETES MELLITUS WITHOUT COMPLICATION, WITHOUT LONG-TERM CURRENT USE OF INSULIN (HCC): ICD-10-CM

## 2023-01-23 DIAGNOSIS — I10 ESSENTIAL HYPERTENSION: ICD-10-CM

## 2023-01-23 DIAGNOSIS — I10 ESSENTIAL HYPERTENSION: Primary | ICD-10-CM

## 2023-01-23 DIAGNOSIS — J44.9 CHRONIC OBSTRUCTIVE PULMONARY DISEASE, UNSPECIFIED COPD TYPE (HCC): ICD-10-CM

## 2023-01-23 DIAGNOSIS — I42.8 NICM (NONISCHEMIC CARDIOMYOPATHY) (HCC): ICD-10-CM

## 2023-01-23 DIAGNOSIS — M06.9 RHEUMATOID ARTHRITIS, INVOLVING UNSPECIFIED SITE, UNSPECIFIED WHETHER RHEUMATOID FACTOR PRESENT (HCC): ICD-10-CM

## 2023-01-23 LAB
ANION GAP SERPL CALCULATED.3IONS-SCNC: 16 MMOL/L (ref 3–16)
BUN BLDV-MCNC: 10 MG/DL (ref 7–20)
CALCIUM SERPL-MCNC: 9.3 MG/DL (ref 8.3–10.6)
CHLORIDE BLD-SCNC: 105 MMOL/L (ref 99–110)
CO2: 21 MMOL/L (ref 21–32)
CREAT SERPL-MCNC: 0.8 MG/DL (ref 0.6–1.2)
GFR SERPL CREATININE-BSD FRML MDRD: >60 ML/MIN/{1.73_M2}
GLUCOSE BLD-MCNC: 116 MG/DL (ref 70–99)
POTASSIUM SERPL-SCNC: 5.1 MMOL/L (ref 3.5–5.1)
SODIUM BLD-SCNC: 142 MMOL/L (ref 136–145)

## 2023-01-23 PROCEDURE — 3023F SPIROM DOC REV: CPT | Performed by: FAMILY MEDICINE

## 2023-01-23 PROCEDURE — 3017F COLORECTAL CA SCREEN DOC REV: CPT | Performed by: FAMILY MEDICINE

## 2023-01-23 PROCEDURE — 3079F DIAST BP 80-89 MM HG: CPT | Performed by: FAMILY MEDICINE

## 2023-01-23 PROCEDURE — G8482 FLU IMMUNIZE ORDER/ADMIN: HCPCS | Performed by: FAMILY MEDICINE

## 2023-01-23 PROCEDURE — 2022F DILAT RTA XM EVC RTNOPTHY: CPT | Performed by: FAMILY MEDICINE

## 2023-01-23 PROCEDURE — G8419 CALC BMI OUT NRM PARAM NOF/U: HCPCS | Performed by: FAMILY MEDICINE

## 2023-01-23 PROCEDURE — 99214 OFFICE O/P EST MOD 30 MIN: CPT | Performed by: FAMILY MEDICINE

## 2023-01-23 PROCEDURE — G8427 DOCREV CUR MEDS BY ELIG CLIN: HCPCS | Performed by: FAMILY MEDICINE

## 2023-01-23 PROCEDURE — 4004F PT TOBACCO SCREEN RCVD TLK: CPT | Performed by: FAMILY MEDICINE

## 2023-01-23 PROCEDURE — 3074F SYST BP LT 130 MM HG: CPT | Performed by: FAMILY MEDICINE

## 2023-01-23 PROCEDURE — 3046F HEMOGLOBIN A1C LEVEL >9.0%: CPT | Performed by: FAMILY MEDICINE

## 2023-01-23 ASSESSMENT — PATIENT HEALTH QUESTIONNAIRE - PHQ9
10. IF YOU CHECKED OFF ANY PROBLEMS, HOW DIFFICULT HAVE THESE PROBLEMS MADE IT FOR YOU TO DO YOUR WORK, TAKE CARE OF THINGS AT HOME, OR GET ALONG WITH OTHER PEOPLE: 0
9. THOUGHTS THAT YOU WOULD BE BETTER OFF DEAD, OR OF HURTING YOURSELF: 0
SUM OF ALL RESPONSES TO PHQ QUESTIONS 1-9: 0
1. LITTLE INTEREST OR PLEASURE IN DOING THINGS: 0
8. MOVING OR SPEAKING SO SLOWLY THAT OTHER PEOPLE COULD HAVE NOTICED. OR THE OPPOSITE, BEING SO FIGETY OR RESTLESS THAT YOU HAVE BEEN MOVING AROUND A LOT MORE THAN USUAL: 0
7. TROUBLE CONCENTRATING ON THINGS, SUCH AS READING THE NEWSPAPER OR WATCHING TELEVISION: 0
SUM OF ALL RESPONSES TO PHQ9 QUESTIONS 1 & 2: 0
4. FEELING TIRED OR HAVING LITTLE ENERGY: 0
5. POOR APPETITE OR OVEREATING: 0
SUM OF ALL RESPONSES TO PHQ QUESTIONS 1-9: 0
6. FEELING BAD ABOUT YOURSELF - OR THAT YOU ARE A FAILURE OR HAVE LET YOURSELF OR YOUR FAMILY DOWN: 0
2. FEELING DOWN, DEPRESSED OR HOPELESS: 0
3. TROUBLE FALLING OR STAYING ASLEEP: 0

## 2023-01-23 ASSESSMENT — ENCOUNTER SYMPTOMS
DIARRHEA: 0
NAUSEA: 0
ABDOMINAL DISTENTION: 0
CHEST TIGHTNESS: 0
SHORTNESS OF BREATH: 0
VOMITING: 0
CONSTIPATION: 0
BLOOD IN STOOL: 0
ABDOMINAL PAIN: 0

## 2023-01-23 NOTE — PROGRESS NOTES
Subjective:      Patient ID: Kendra Sears is a 62 y.o. female.    Chief Complaint   Patient presents with    Check-Up     Lipids, diabetes         Patient presents with:  Check-Up: Lipids, diabetes     Here for the above  She is well   Her meds reviewed and updated    She has no c/o    Glucose at home not checked    YOB: 1960    Date of Visit:  1/23/2023     -- Azithromycin -- Itching, Rash, Other (See Comments)                           and Swelling    --  Other reaction(s): Other (See Comments)             FEVER             FEVER   -- Baclofen -- Nausea And Vomiting, Itching and                            Rash   -- Nuts [Peanut Oil] -- Anaphylaxis   -- Peanut-Containing Drug Products -- Anaphylaxis   -- Sulfa Antibiotics -- Anaphylaxis and Rash   -- Adalimumab -- Other (See Comments)    --  \"immune system crashes\"   -- Avelox [Moxifloxacin Hcl In Nacl]    -- Bactrim    -- Belbuca [Buprenorphine Hcl] -- Itching and Other (See Comments)    --  Headaches, insomina   -- Infliximab -- Other (See Comments)    --  \"immune system crashes\"   -- Lisinopril -- Other (See Comments)    --  Cough   -- Zithromax [Azithromycin Dihydrate]    -- Adhesive Tape -- Rash    --  PAPER TAPE IS OK             PAPER TAPE IS OK   -- Moxifloxacin -- Rash, Other (See Comments) and                            Swelling    --  Other reaction(s): Other (See Comments)             FEVER             FEVER             Other reaction(s): Fever   -- Sulfamethoxazole-Trimethoprim -- Other (See Comments), Rash and                            Swelling    --  Other reaction(s): Other (See Comments)             FEVER             FEVER    Current Outpatient Medications:  tiotropium (SPIRIVA RESPIMAT) 2.5 MCG/ACT AERS inhaler, Inhale 2 puffs into the lungs daily, Disp: 1 each, Rfl: 0  metFORMIN (GLUCOPHAGE-XR) 500 MG extended release tablet, TAKE 1 TABLET TWICE DAILY, Disp: 180 tablet, Rfl: 2  montelukast (SINGULAIR) 10 MG tablet, TAKE 1  TABLET EVERY DAY, Disp: 90 tablet, Rfl: 1  losartan (COZAAR) 100 MG tablet, TAKE 1 TABLET EVERY DAY, Disp: 90 tablet, Rfl: 3  sulindac (CLINORIL) 200 MG tablet, TAKE 1 TABLET TWICE DAILY, Disp: 180 tablet, Rfl: 1  carvedilol (COREG) 25 MG tablet, TAKE 1 TABLET TWICE DAILY, Disp: 180 tablet, Rfl: 3  cyclobenzaprine (FLEXERIL) 10 mg tablet, TAKE 1 TABLET TWICE DAILY AS NEEDED FOR MUSCLE SPASMS, Disp: 180 tablet, Rfl: 1  atorvastatin (LIPITOR) 20 MG tablet, TAKE 1 TABLET EVERY DAY, Disp: 90 tablet, Rfl: 1  ACCU-CHEK SMARTVIEW strip, TEST  ONE  TIME  DAILY, Disp: 100 strip, Rfl: 3  albuterol sulfate  (90 Base) MCG/ACT inhaler, INHALE 2 PUFFS INTO THE LUNGS EVERY 6 HOURS AS NEEDED FOR WHEEZING OR SHORTNESS OF BREATH OR COUGH, Disp: 54 g, Rfl: 1  Blood Glucose Monitoring Suppl JOSSELYN, 1 each by Does not apply route daily, Disp: 1 Device, Rfl: 0  traZODone (DESYREL) 50 MG tablet, Take 100 mg by mouth 2 times daily Take 2 tablets nightly, Disp: , Rfl: 2  ALPRAZolam (XANAX PO), Take by mouth, Disp: , Rfl:   venlafaxine (EFFEXOR XR) 150 MG extended release capsule, TAKE 1 CAPSULE EVERY DAY, Disp: 90 capsule, Rfl: 1  albuterol (PROVENTIL) (2.5 MG/3ML) 0.083% nebulizer solution, Use one vial every 6 hours in nebulizer as needed, Disp: 120 vial, Rfl: 2  ACCU-CHEK FASTCLIX LANCETS MISC, TEST ONE TIME DAILY, Disp: 102 each, Rfl: 3     No current facility-administered medications for this visit.      --------------------------               01/23/23                     1338        --------------------------   BP:          118/80        Site:    Left Upper Arm    Position:    Sitting        Cuff Size:  Medium Adult     Pulse:         72          Temp:   97 °F (36.1 °C)    TempSrc:    Temporal       Weight: 164 lb (74.4 kg)   Height: 5' 6\" (1.676 m)   --------------------------  Body mass index is 26.47 kg/m².      Wt Readings from Last 3 Encounters:  01/23/23 : 164 lb (74.4 kg)  10/12/22 : 164 lb (74.4 kg)  09/22/22 : 162 lb (73.5 kg)    BP Readings from Last 3 Encounters:  01/23/23 : 118/80  10/12/22 : 132/78  09/22/22 : 120/80              Review of Systems   Constitutional:  Negative for appetite change, chills, fever and unexpected weight change. Respiratory:  Negative for chest tightness and shortness of breath. No hemoptysis    Cardiovascular:  Negative for chest pain, palpitations and leg swelling. Gastrointestinal:  Negative for abdominal distention, abdominal pain, blood in stool, constipation, diarrhea, nausea and vomiting. Genitourinary:  Negative for difficulty urinating, dysuria and hematuria. Musculoskeletal:         Feet ok no sores    Neurological:  Negative for headaches. Objective:   Physical Exam  Constitutional:       General: She is not in acute distress. Appearance: Normal appearance. She is well-developed. She is not ill-appearing or diaphoretic. Cardiovascular:      Rate and Rhythm: Normal rate and regular rhythm. Heart sounds: Normal heart sounds. No murmur heard. No friction rub. No gallop. Pulmonary:      Effort: Pulmonary effort is normal. No tachypnea, accessory muscle usage or respiratory distress. Breath sounds: Wheezing present. No decreased breath sounds, rhonchi or rales. Comments: Occ wheezes heard in posterior lungs   Lymphadenopathy:      Cervical: No cervical adenopathy. Upper Body:      Right upper body: No supraclavicular adenopathy. Left upper body: No supraclavicular adenopathy. Skin:     General: Skin is warm and dry. Coloration: Skin is not pale. Neurological:      Mental Status: She is alert. Assessment:       Diagnosis Orders   1. Essential hypertension  Basic Metabolic Panel      2. Type 2 diabetes mellitus without complication, without long-term current use of insulin (HCC)  Basic Metabolic Panel    Hemoglobin A1C      3.  Chronic obstructive pulmonary disease, unspecified COPD type ([facility]        4. NICM (nonischemic cardiomyopathy) ([facility]        5.  Rheumatoid arthritis, involving unspecified site, unspecified whether rheumatoid factor present [facility])            She no longer see dr Phan Dupree for her hx of RA   She is stable on clinoril   She is stable with her other illnesses and she does follow closely with the specialists    Reviewed the cardiology note on 10/12/22 for cad and nonischemic cardiomyopathy   Pulmonary note on 9/22/22 for copd and stable       Plan:      Consider the shingle vaccine  Continue the diet  Stay with the medicines   See in about 6 months         Korey Naranjo MD

## 2023-01-24 LAB
ESTIMATED AVERAGE GLUCOSE: 139.9 MG/DL
HBA1C MFR BLD: 6.5 %

## 2023-02-24 SDOH — HEALTH STABILITY: PHYSICAL HEALTH: ON AVERAGE, HOW MANY DAYS PER WEEK DO YOU ENGAGE IN MODERATE TO STRENUOUS EXERCISE (LIKE A BRISK WALK)?: 1 DAY

## 2023-02-24 SDOH — HEALTH STABILITY: PHYSICAL HEALTH: ON AVERAGE, HOW MANY MINUTES DO YOU ENGAGE IN EXERCISE AT THIS LEVEL?: 20 MIN

## 2023-02-24 ASSESSMENT — LIFESTYLE VARIABLES
HOW OFTEN DO YOU HAVE A DRINK CONTAINING ALCOHOL: 2
HOW OFTEN DO YOU HAVE A DRINK CONTAINING ALCOHOL: MONTHLY OR LESS
HOW MANY STANDARD DRINKS CONTAINING ALCOHOL DO YOU HAVE ON A TYPICAL DAY: 1
HOW MANY STANDARD DRINKS CONTAINING ALCOHOL DO YOU HAVE ON A TYPICAL DAY: 1 OR 2
HOW OFTEN DO YOU HAVE SIX OR MORE DRINKS ON ONE OCCASION: 1

## 2023-02-24 ASSESSMENT — PATIENT HEALTH QUESTIONNAIRE - PHQ9
SUM OF ALL RESPONSES TO PHQ QUESTIONS 1-9: 0
SUM OF ALL RESPONSES TO PHQ9 QUESTIONS 1 & 2: 0
SUM OF ALL RESPONSES TO PHQ QUESTIONS 1-9: 0
SUM OF ALL RESPONSES TO PHQ QUESTIONS 1-9: 0
2. FEELING DOWN, DEPRESSED OR HOPELESS: 0
1. LITTLE INTEREST OR PLEASURE IN DOING THINGS: 0
SUM OF ALL RESPONSES TO PHQ QUESTIONS 1-9: 0

## 2023-03-02 ENCOUNTER — TELEPHONE (OUTPATIENT)
Dept: FAMILY MEDICINE CLINIC | Age: 63
End: 2023-03-02

## 2023-03-02 ENCOUNTER — OFFICE VISIT (OUTPATIENT)
Dept: FAMILY MEDICINE CLINIC | Age: 63
End: 2023-03-02

## 2023-03-02 VITALS
HEIGHT: 66 IN | TEMPERATURE: 97.3 F | HEART RATE: 81 BPM | OXYGEN SATURATION: 98 % | DIASTOLIC BLOOD PRESSURE: 80 MMHG | WEIGHT: 165.4 LBS | SYSTOLIC BLOOD PRESSURE: 124 MMHG | BODY MASS INDEX: 26.58 KG/M2

## 2023-03-02 DIAGNOSIS — Z00.00 INITIAL MEDICARE ANNUAL WELLNESS VISIT: Primary | ICD-10-CM

## 2023-03-02 SDOH — ECONOMIC STABILITY: FOOD INSECURITY: WITHIN THE PAST 12 MONTHS, YOU WORRIED THAT YOUR FOOD WOULD RUN OUT BEFORE YOU GOT MONEY TO BUY MORE.: NEVER TRUE

## 2023-03-02 SDOH — ECONOMIC STABILITY: HOUSING INSECURITY
IN THE LAST 12 MONTHS, WAS THERE A TIME WHEN YOU DID NOT HAVE A STEADY PLACE TO SLEEP OR SLEPT IN A SHELTER (INCLUDING NOW)?: NO

## 2023-03-02 SDOH — ECONOMIC STABILITY: INCOME INSECURITY: HOW HARD IS IT FOR YOU TO PAY FOR THE VERY BASICS LIKE FOOD, HOUSING, MEDICAL CARE, AND HEATING?: NOT HARD AT ALL

## 2023-03-02 SDOH — ECONOMIC STABILITY: FOOD INSECURITY: WITHIN THE PAST 12 MONTHS, THE FOOD YOU BOUGHT JUST DIDN'T LAST AND YOU DIDN'T HAVE MONEY TO GET MORE.: NEVER TRUE

## 2023-03-02 ASSESSMENT — PATIENT HEALTH QUESTIONNAIRE - PHQ9
SUM OF ALL RESPONSES TO PHQ QUESTIONS 1-9: 1
8. MOVING OR SPEAKING SO SLOWLY THAT OTHER PEOPLE COULD HAVE NOTICED. OR THE OPPOSITE, BEING SO FIGETY OR RESTLESS THAT YOU HAVE BEEN MOVING AROUND A LOT MORE THAN USUAL: 0
10. IF YOU CHECKED OFF ANY PROBLEMS, HOW DIFFICULT HAVE THESE PROBLEMS MADE IT FOR YOU TO DO YOUR WORK, TAKE CARE OF THINGS AT HOME, OR GET ALONG WITH OTHER PEOPLE: 0
SUM OF ALL RESPONSES TO PHQ QUESTIONS 1-9: 1
9. THOUGHTS THAT YOU WOULD BE BETTER OFF DEAD, OR OF HURTING YOURSELF: 0
4. FEELING TIRED OR HAVING LITTLE ENERGY: 0
7. TROUBLE CONCENTRATING ON THINGS, SUCH AS READING THE NEWSPAPER OR WATCHING TELEVISION: 0
2. FEELING DOWN, DEPRESSED OR HOPELESS: 1
1. LITTLE INTEREST OR PLEASURE IN DOING THINGS: 0
SUM OF ALL RESPONSES TO PHQ QUESTIONS 1-9: 1
5. POOR APPETITE OR OVEREATING: 0
3. TROUBLE FALLING OR STAYING ASLEEP: 0
6. FEELING BAD ABOUT YOURSELF - OR THAT YOU ARE A FAILURE OR HAVE LET YOURSELF OR YOUR FAMILY DOWN: 0
SUM OF ALL RESPONSES TO PHQ9 QUESTIONS 1 & 2: 1
SUM OF ALL RESPONSES TO PHQ QUESTIONS 1-9: 1

## 2023-03-02 ASSESSMENT — LIFESTYLE VARIABLES
HOW OFTEN DO YOU HAVE A DRINK CONTAINING ALCOHOL: NEVER
HOW MANY STANDARD DRINKS CONTAINING ALCOHOL DO YOU HAVE ON A TYPICAL DAY: PATIENT DOES NOT DRINK

## 2023-03-02 NOTE — TELEPHONE ENCOUNTER
Jacquelyn is requesting Dexcom instead of pricking her finger.   Please advise if you will prescribe Dexcom for her to check her sugars.

## 2023-03-02 NOTE — PROGRESS NOTES
Medicare Annual Wellness Visit    Muriel Holloway is here for Medicare AWV    Assessment & Plan   Initial Medicare annual wellness visit      Recommendations for Preventive Services Due: see orders and patient instructions/AVS.  Recommended screening schedule for the next 5-10 years is provided to the patient in written form: see Patient Instructions/AVS.     No follow-ups on file. Subjective   Patient is going to work on quitting smoking  Patient has had shingles and denies shingles vaccine      Patient's complete Health Risk Assessment and screening values have been reviewed and are found in Flowsheets. The following problems were reviewed today and where indicated follow up appointments were made and/or referrals ordered. Positive Risk Factor Screenings with Interventions:                        Tobacco Use:  Tobacco Use: High Risk    Smoking Tobacco Use: Every Day    Smokeless Tobacco Use: Never    Passive Exposure: Not on file     E-cigarette/Vaping       Questions Responses    E-cigarette/Vaping Use     Start Date     Passive Exposure No    Quit Date     Counseling Given No    Comments           Interventions:  Patient comments: is going to quit soon  Patient declined any further intervention or treatment                        Objective   Vitals:    03/02/23 1307   BP: 124/80   Site: Left Upper Arm   Position: Sitting   Cuff Size: Medium Adult   Pulse: 81   Temp: 97.3 °F (36.3 °C)   SpO2: 98%   Weight: 165 lb 6.4 oz (75 kg)   Height: 5' 6\" (1.676 m)      Body mass index is 26.7 kg/m². Allergies   Allergen Reactions    Azithromycin Itching, Rash, Other (See Comments) and Swelling     Other reaction(s):  Other (See Comments)  FEVER  FEVER    Baclofen Nausea And Vomiting, Itching and Rash    Nuts [Peanut Oil] Anaphylaxis    Peanut-Containing Drug Products Anaphylaxis    Sulfa Antibiotics Anaphylaxis and Rash    Adalimumab Other (See Comments)     \"immune system crashes\"    Avelox [Moxifloxacin Hcl In Nacl]     Bactrim     Belbuca [Buprenorphine Hcl] Itching and Other (See Comments)     Headaches, insomina    Infliximab Other (See Comments)     \"immune system crashes\"    Lisinopril Other (See Comments)     Cough    Zithromax [Azithromycin Dihydrate]     Adhesive Tape Rash     PAPER TAPE IS OK  PAPER TAPE IS OK    Moxifloxacin Rash, Other (See Comments) and Swelling     Other reaction(s): Other (See Comments)  FEVER  FEVER  Other reaction(s): Fever    Sulfamethoxazole-Trimethoprim Other (See Comments), Rash and Swelling     Other reaction(s): Other (See Comments)  FEVER  FEVER     Prior to Visit Medications    Medication Sig Taking?  Authorizing Provider   tiotropium (SPIRIVA RESPIMAT) 2.5 MCG/ACT AERS inhaler Inhale 2 puffs into the lungs daily Yes Marni Rivas MD   metFORMIN (GLUCOPHAGE-XR) 500 MG extended release tablet TAKE 1 TABLET TWICE DAILY Yes Prem Hdez MD   montelukast (SINGULAIR) 10 MG tablet TAKE 1 TABLET EVERY DAY Yes Prem Hdez MD   losartan (COZAAR) 100 MG tablet TAKE 1 TABLET EVERY DAY Yes Ariadna Chisholm MD   sulindac (CLINORIL) 200 MG tablet TAKE 1 TABLET TWICE DAILY Yes Prem Hdez MD   carvedilol (COREG) 25 MG tablet TAKE 1 TABLET TWICE DAILY Yes Ariadna Chisholm MD   cyclobenzaprine (FLEXERIL) 10 mg tablet TAKE 1 TABLET TWICE DAILY AS NEEDED FOR MUSCLE SPASMS Yes Prem Hdez MD   atorvastatin (LIPITOR) 20 MG tablet TAKE 1 TABLET EVERY DAY Yes Prem Hdez MD   ACCU-CHEK SMARTVIEW strip TEST  ONE  TIME  DAILY Yes Prem Hdez MD   albuterol sulfate  (90 Base) MCG/ACT inhaler INHALE 2 PUFFS INTO THE LUNGS EVERY 6 HOURS AS NEEDED FOR WHEEZING OR SHORTNESS OF BREATH OR COUGH Yes Prem Hdez MD   Blood Glucose Monitoring Suppl JOSSELYN 1 each by Does not apply route daily Yes Prem Hdez MD   traZODone (DESYREL) 50 MG tablet Take 100 mg by mouth 2 times daily Take 2 tablets nightly Yes Historical Provider, MD   ALPRAZolam (XANAX PO) Take by mouth Yes Cesar Castillo MD venlafaxine (EFFEXOR XR) 150 MG extended release capsule TAKE 1 CAPSULE EVERY DAY Yes Terrial Mcardle, MD   albuterol (PROVENTIL) (2.5 MG/3ML) 0.083% nebulizer solution Use one vial every 6 hours in nebulizer as needed Yes Terrial Mcardle, MD   Virgilioa Havers TEST ONE TIME DAILY Yes Terrial Mcardle, MD       CareTeam (Including outside providers/suppliers regularly involved in providing care):   Patient Care Team:  Terrial Mcardle, MD as PCP - General (Family Medicine)  Terrial Mcardle, MD as PCP - Empaneled Provider  Abbe Plaza MD as Surgeon (Orthopedic Surgery)  Marcos Peralta MD as Consulting Physician (Cardiology)  Gege Munoz MD as Consulting Physician (Orthopedic Surgery)  Randal Cobos MD as Consulting Physician (Family Medicine)  Robert Grier RN as Nurse Navigator     Reviewed and updated this visit:  Tobacco  Allergies  Meds  Med Hx  Surg Hx  Soc Hx  Fam Hx        I, Michael Avina LPN, 8/0/1973, performed the documented evaluation under the direct supervision of the attending physician.       Michael Avina LPN

## 2023-03-02 NOTE — PATIENT INSTRUCTIONS
Personalized Preventive Plan for Amber Salt - 3/2/2023  Medicare offers a range of preventive health benefits. Some of the tests and screenings are paid in full while other may be subject to a deductible, co-insurance, and/or copay. Some of these benefits include a comprehensive review of your medical history including lifestyle, illnesses that may run in your family, and various assessments and screenings as appropriate. After reviewing your medical record and screening and assessments performed today your provider may have ordered immunizations, labs, imaging, and/or referrals for you. A list of these orders (if applicable) as well as your Preventive Care list are included within your After Visit Summary for your review. Other Preventive Recommendations:    A preventive eye exam performed by an eye specialist is recommended every 1-2 years to screen for glaucoma; cataracts, macular degeneration, and other eye disorders. A preventive dental visit is recommended every 6 months. Try to get at least 150 minutes of exercise per week or 10,000 steps per day on a pedometer . Order or download the FREE \"Exercise & Physical Activity: Your Everyday Guide\" from [employer]. Call 0-504.571.1087 or search [employer] online. You need 8352-9664 mg of calcium and 8099-0030 IU of vitamin D per day. It is possible to meet your calcium requirement with diet alone, but a vitamin D supplement is usually necessary to meet this goal.  When exposed to the sun, use a sunscreen that protects against both UVA and UVB radiation with an SPF of 30 or greater. Reapply every 2 to 3 hours or after sweating, drying off with a towel, or swimming. Always wear a seat belt when traveling in a car. Always wear a helmet when riding a bicycle or motorcycle.

## 2023-03-03 ENCOUNTER — TELEPHONE (OUTPATIENT)
Dept: FAMILY MEDICINE CLINIC | Age: 63
End: 2023-03-03

## 2023-03-03 RX ORDER — BLOOD-GLUCOSE,RECEIVER,CONT
1 EACH MISCELLANEOUS CONTINUOUS
Qty: 1 EACH | Refills: 0 | Status: SHIPPED | OUTPATIENT
Start: 2023-03-03

## 2023-03-03 RX ORDER — BLOOD-GLUCOSE SENSOR
1 EACH MISCELLANEOUS SEE ADMIN INSTRUCTIONS
Qty: 3 EACH | Refills: 2 | Status: SHIPPED | OUTPATIENT
Start: 2023-03-03

## 2023-03-03 RX ORDER — BLOOD-GLUCOSE TRANSMITTER
1 EACH MISCELLANEOUS SEE ADMIN INSTRUCTIONS
Qty: 1 EACH | Refills: 3 | Status: SHIPPED | OUTPATIENT
Start: 2023-03-03

## 2023-03-03 NOTE — TELEPHONE ENCOUNTER
Done  Orders Placed This Encounter   Medications    Continuous Blood Gluc Sensor (DEXCOM G6 SENSOR) MISC     Si each by Does not apply route See Admin Instructions Use one sensor every 10 days     Dispense:  3 each     Refill:  2    Continuous Blood Gluc  (DEXCOM G6 ) JOSSELYN     Si each by Does not apply route continuous     Dispense:  1 each     Refill:  0    Continuous Blood Gluc Transmit (DEXCOM G6 TRANSMITTER) MISC     Si each by Does not apply route See Admin Instructions Use  a transmnitter every three months     Dispense:  1 each     Refill:  3

## 2023-03-06 ENCOUNTER — TELEPHONE (OUTPATIENT)
Dept: FAMILY MEDICINE CLINIC | Age: 63
End: 2023-03-06

## 2023-03-06 ENCOUNTER — HOSPITAL ENCOUNTER (EMERGENCY)
Age: 63
Discharge: HOME | End: 2023-03-06
Attending: EMERGENCY MEDICINE
Payer: MEDICARE

## 2023-03-06 ENCOUNTER — APPOINTMENT (OUTPATIENT)
Dept: GENERAL RADIOLOGY | Age: 63
End: 2023-03-06
Payer: MEDICARE

## 2023-03-06 VITALS
HEIGHT: 66 IN | OXYGEN SATURATION: 95 % | HEART RATE: 76 BPM | RESPIRATION RATE: 16 BRPM | SYSTOLIC BLOOD PRESSURE: 133 MMHG | TEMPERATURE: 97.6 F | DIASTOLIC BLOOD PRESSURE: 61 MMHG | WEIGHT: 163 LBS | BODY MASS INDEX: 26.2 KG/M2

## 2023-03-06 DIAGNOSIS — J45.21 INTERMITTENT ASTHMA WITH ACUTE EXACERBATION, UNSPECIFIED ASTHMA SEVERITY: ICD-10-CM

## 2023-03-06 DIAGNOSIS — J45.21 EXACERBATION OF INTERMITTENT ASTHMA, UNSPECIFIED ASTHMA SEVERITY: Primary | ICD-10-CM

## 2023-03-06 LAB
ANION GAP SERPL CALCULATED.3IONS-SCNC: 10 MMOL/L (ref 3–16)
BASOPHILS ABSOLUTE: 0.1 K/UL (ref 0–0.2)
BASOPHILS RELATIVE PERCENT: 0.8 %
BUN BLDV-MCNC: 9 MG/DL (ref 7–20)
CALCIUM SERPL-MCNC: 9 MG/DL (ref 8.3–10.6)
CHLORIDE BLD-SCNC: 105 MMOL/L (ref 99–110)
CO2: 22 MMOL/L (ref 21–32)
CREAT SERPL-MCNC: 0.6 MG/DL (ref 0.6–1.2)
EOSINOPHILS ABSOLUTE: 0.1 K/UL (ref 0–0.6)
EOSINOPHILS RELATIVE PERCENT: 0.7 %
GFR SERPL CREATININE-BSD FRML MDRD: >60 ML/MIN/{1.73_M2}
GLUCOSE BLD-MCNC: 178 MG/DL (ref 70–99)
HCT VFR BLD CALC: 40.7 % (ref 36–48)
HEMOGLOBIN: 13.9 G/DL (ref 12–16)
LYMPHOCYTES ABSOLUTE: 2.8 K/UL (ref 1–5.1)
LYMPHOCYTES RELATIVE PERCENT: 37.7 %
MCH RBC QN AUTO: 32.2 PG (ref 26–34)
MCHC RBC AUTO-ENTMCNC: 34.2 G/DL (ref 31–36)
MCV RBC AUTO: 94.4 FL (ref 80–100)
MONOCYTES ABSOLUTE: 0.4 K/UL (ref 0–1.3)
MONOCYTES RELATIVE PERCENT: 5.2 %
NEUTROPHILS ABSOLUTE: 4.1 K/UL (ref 1.7–7.7)
NEUTROPHILS RELATIVE PERCENT: 55.6 %
PDW BLD-RTO: 13.9 % (ref 12.4–15.4)
PLATELET # BLD: 223 K/UL (ref 135–450)
PMV BLD AUTO: 7.5 FL (ref 5–10.5)
POTASSIUM REFLEX MAGNESIUM: 4.2 MMOL/L (ref 3.5–5.1)
RBC # BLD: 4.31 M/UL (ref 4–5.2)
SARS-COV-2, NAAT: NOT DETECTED
SODIUM BLD-SCNC: 137 MMOL/L (ref 136–145)
TROPONIN: <0.01 NG/ML
WBC # BLD: 7.5 K/UL (ref 4–11)

## 2023-03-06 PROCEDURE — 85025 COMPLETE CBC W/AUTO DIFF WBC: CPT

## 2023-03-06 PROCEDURE — 6360000002 HC RX W HCPCS: Performed by: EMERGENCY MEDICINE

## 2023-03-06 PROCEDURE — 84484 ASSAY OF TROPONIN QUANT: CPT

## 2023-03-06 PROCEDURE — 99285 EMERGENCY DEPT VISIT HI MDM: CPT

## 2023-03-06 PROCEDURE — 93005 ELECTROCARDIOGRAM TRACING: CPT | Performed by: EMERGENCY MEDICINE

## 2023-03-06 PROCEDURE — 71046 X-RAY EXAM CHEST 2 VIEWS: CPT

## 2023-03-06 PROCEDURE — 94640 AIRWAY INHALATION TREATMENT: CPT

## 2023-03-06 PROCEDURE — 6370000000 HC RX 637 (ALT 250 FOR IP): Performed by: EMERGENCY MEDICINE

## 2023-03-06 PROCEDURE — 94760 N-INVAS EAR/PLS OXIMETRY 1: CPT

## 2023-03-06 PROCEDURE — 87635 SARS-COV-2 COVID-19 AMP PRB: CPT

## 2023-03-06 PROCEDURE — 80048 BASIC METABOLIC PNL TOTAL CA: CPT

## 2023-03-06 RX ORDER — CETIRIZINE HYDROCHLORIDE 10 MG/1
10 TABLET ORAL DAILY
Qty: 30 TABLET | Refills: 0 | Status: SHIPPED | OUTPATIENT
Start: 2023-03-06 | End: 2023-04-05

## 2023-03-06 RX ORDER — PREDNISONE 20 MG/1
40 TABLET ORAL DAILY
Qty: 8 TABLET | Refills: 0 | Status: SHIPPED | OUTPATIENT
Start: 2023-03-06 | End: 2023-03-10

## 2023-03-06 RX ORDER — IPRATROPIUM BROMIDE AND ALBUTEROL SULFATE 2.5; .5 MG/3ML; MG/3ML
1 SOLUTION RESPIRATORY (INHALATION) ONCE
Status: COMPLETED | OUTPATIENT
Start: 2023-03-06 | End: 2023-03-06

## 2023-03-06 RX ORDER — ALBUTEROL SULFATE 2.5 MG/3ML
SOLUTION RESPIRATORY (INHALATION)
Qty: 120 EACH | Refills: 0 | Status: SHIPPED | OUTPATIENT
Start: 2023-03-06

## 2023-03-06 RX ORDER — PREDNISONE 20 MG/1
60 TABLET ORAL ONCE
Status: COMPLETED | OUTPATIENT
Start: 2023-03-06 | End: 2023-03-06

## 2023-03-06 RX ADMIN — IPRATROPIUM BROMIDE AND ALBUTEROL SULFATE 1 AMPULE: .5; 3 SOLUTION RESPIRATORY (INHALATION) at 13:11

## 2023-03-06 RX ADMIN — PREDNISONE 60 MG: 20 TABLET ORAL at 12:29

## 2023-03-06 RX ADMIN — ALBUTEROL SULFATE 2.5 MG: 2.5 SOLUTION RESPIRATORY (INHALATION) at 13:11

## 2023-03-06 ASSESSMENT — PAIN DESCRIPTION - DESCRIPTORS: DESCRIPTORS: SORE

## 2023-03-06 ASSESSMENT — PAIN SCALES - GENERAL: PAINLEVEL_OUTOF10: 4

## 2023-03-06 ASSESSMENT — PAIN DESCRIPTION - LOCATION: LOCATION: THROAT

## 2023-03-06 ASSESSMENT — LIFESTYLE VARIABLES: HOW OFTEN DO YOU HAVE A DRINK CONTAINING ALCOHOL: NEVER

## 2023-03-06 ASSESSMENT — PAIN - FUNCTIONAL ASSESSMENT: PAIN_FUNCTIONAL_ASSESSMENT: 0-10

## 2023-03-06 ASSESSMENT — PAIN DESCRIPTION - PAIN TYPE: TYPE: ACUTE PAIN

## 2023-03-06 NOTE — PROGRESS NOTES
Pharmacy Home Medication Reconciliation Note    A medication reconciliation has been completed for Rebekah Harrison 1960    Pharmacy: [address]  Information provided by: patient    The patient's home medication list is as follows: No current facility-administered medications on file prior to encounter.      Current Outpatient Medications on File Prior to Encounter   Medication Sig Dispense Refill    Continuous Blood Gluc Sensor (DEXCOM G6 SENSOR) MISC 1 each by Does not apply route See Admin Instructions Use one sensor every 10 days 3 each 2    Continuous Blood Gluc  (DEXCOM G6 ) JOSSELYN 1 each by Does not apply route continuous 1 each 0    Continuous Blood Gluc Transmit (DEXCOM G6 TRANSMITTER) MISC 1 each by Does not apply route See Admin Instructions Use  a transmnitter every three months 1 each 3    tiotropium (SPIRIVA RESPIMAT) 2.5 MCG/ACT AERS inhaler Inhale 2 puffs into the lungs daily 1 each 0    metFORMIN (GLUCOPHAGE-XR) 500 MG extended release tablet TAKE 1 TABLET TWICE DAILY 180 tablet 2    montelukast (SINGULAIR) 10 MG tablet TAKE 1 TABLET EVERY DAY 90 tablet 1    losartan (COZAAR) 100 MG tablet TAKE 1 TABLET EVERY DAY 90 tablet 3    sulindac (CLINORIL) 200 MG tablet TAKE 1 TABLET TWICE DAILY 180 tablet 1    carvedilol (COREG) 25 MG tablet TAKE 1 TABLET TWICE DAILY 180 tablet 3    cyclobenzaprine (FLEXERIL) 10 mg tablet TAKE 1 TABLET TWICE DAILY AS NEEDED FOR MUSCLE SPASMS 180 tablet 1    atorvastatin (LIPITOR) 20 MG tablet TAKE 1 TABLET EVERY DAY 90 tablet 1    ACCU-CHEK SMARTVIEW strip TEST  ONE  TIME  DAILY 100 strip 3    albuterol sulfate  (90 Base) MCG/ACT inhaler INHALE 2 PUFFS INTO THE LUNGS EVERY 6 HOURS AS NEEDED FOR WHEEZING OR SHORTNESS OF BREATH OR COUGH 54 g 1    Blood Glucose Monitoring Suppl JOSSELYN 1 each by Does not apply route daily 1 Device 0    traZODone (DESYREL) 50 MG tablet Take 100 mg by mouth nightly Take 2 tablets nightly 2    ALPRAZolam (XANAX PO) Take 0.5 mg by mouth in the morning and at bedtime      venlafaxine (EFFEXOR XR) 150 MG extended release capsule TAKE 1 CAPSULE EVERY DAY 90 capsule 1    albuterol (PROVENTIL) (2.5 MG/3ML) 0.083% nebulizer solution Use one vial every 6 hours in nebulizer as needed 120 vial 2    ACCU-CHEK FASTCLIX LANCETS MISC TEST ONE TIME DAILY 102 each 3         Timing of last doses updated. Thank you,  Frankie Zazueta

## 2023-03-06 NOTE — TELEPHONE ENCOUNTER
Pt called with trouble breathing with oxygen of 88-94. I advised pt to go to the er and be evaluated.

## 2023-03-07 LAB
EKG ATRIAL RATE: 78 BPM
EKG DIAGNOSIS: NORMAL
EKG P AXIS: 60 DEGREES
EKG P-R INTERVAL: 202 MS
EKG Q-T INTERVAL: 404 MS
EKG QRS DURATION: 80 MS
EKG QTC CALCULATION (BAZETT): 460 MS
EKG R AXIS: 43 DEGREES
EKG T AXIS: 91 DEGREES
EKG VENTRICULAR RATE: 78 BPM

## 2023-03-07 PROCEDURE — 93010 ELECTROCARDIOGRAM REPORT: CPT | Performed by: INTERNAL MEDICINE

## 2023-03-07 NOTE — ED PROVIDER NOTES
EMERGENCY DEPARTMENT PROVIDER NOTE    Patient Identification  Pt Name: Luis Warren  MRN: [id number]  [place] 1960  Date of evaluation: 3/6/2023  Provider: Yulia Benites DO  PCP: Zoraida Villanueva MD    Chief Complaint  Shortness of Breath (Arrived per family d/t PCP and pulmon referral d/t SpO2 ranging between 89-94%; currently 97% on RA; c/o sore throat that started approx 4-5 days ago; endorses asthma and COPD)      HPI  (History provided by patient)  This is a 57 y.o. female with pertinent past medical history of asthma and COPD who was brought in by family for shortness of breath. Patient reports symptoms gradually worsening over the past 4 to 5 days. Acutely worsened with exertion. Associated with nonproductive cough and sore throat. Patient checked her oxygen level at home today and noted it was ranging between 89 to 94%, she called her PCP and was instructed to come to the emergency department for further evaluation. She is not hypoxic on arrival here. She denies any fevers, chills, chest pain or abdominal pain. States that she has a nebulizer at home, however ran out of the ampules and has been unable to use it. She does continue to smoke cigarettes.        I have reviewed the following nursing documentation:  Allergies: Azithromycin, Baclofen, Nuts [peanut oil], Peanut-containing drug products, Sulfa antibiotics, Adalimumab, Avelox [moxifloxacin hcl in nacl], Bactrim, Belbuca [buprenorphine hcl], Infliximab, Lisinopril, Zithromax [azithromycin dihydrate], Adhesive tape, Moxifloxacin, and Sulfamethoxazole-trimethoprim    Past medical history:   Past Medical History:   Diagnosis Date    Anxiety     Arrhythmia     Asthma     Chronic back pain     Chronic obstructive pulmonary disease ([facility] [address] 07/06/2022    [facility]     Depression     Diabetes mellitus type II     Dyshidrotic dermatitis     Elevated blood pressure reading without diagnosis of hypertension Essential hypertension 02/28/2018    Hyperlipidemia     Kidney stone     Liver disease     Lump or mass in breast     Orthostatic hypotension     Osteoarthritis     rheumatoid arthritis    Panic disorder     Personal history of renal calculi     kidney stones [id number]    Pneumonia 2007    RA (rheumatoid arthritis) ([facility] [address]     chronic    Scoliosis     Substance abuse ([facility] [address]     Unspecified sleep apnea      Past surgical history:   Past Surgical History:   Procedure Laterality Date    APPENDECTOMY  1978    incidental    BREAST SURGERY  2002    breast tissue removed from right axillary     CATARACT REMOVAL Bilateral 10/2016    COLONOSCOPY  7.10.09    normal; Dr Shari Roberts  08/23/2018    ok dr Jennie Chung repeat in 8 years    CYST REMOVAL  1979    cyst on left ovary -removal of left ovary    ECTOPIC PREGNANCY SURGERY  1978    removal of left tube     FOOT SURGERY  9/1998    B/L release of the fascia     HIP SURGERY      HYSTERECTOMY (CERVIX STATUS UNKNOWN)  1986    removal of uterus    JOINT REPLACEMENT  06/2021    right hip     SALPINGO-OOPHORECTOMY  3283,6715    left ovary removed following cyst removal, right tube and ovary removed due to adhesion    [address] in sinuses    SPINE SURGERY  11/2016    lumbar     UPPER GASTROINTESTINAL ENDOSCOPY  08/23/2018    dr Jennie Chung, recheck in 2 years     WRIST SURGERY  3/2003    repair of left wrist torn ligament-tenosynovitis release right        Home medications:   Discharge Medication List as of 3/6/2023  2:41 PM        CONTINUE these medications which have NOT CHANGED    Details   Continuous Blood Gluc Sensor (DEXCOM G6 SENSOR) MISC 1 each by Does not apply route See Admin Instructions Use one sensor every 10 days, Disp-3 each, R-2Normal      Continuous Blood Gluc  (DEXCOM G6 ) JOSSELYN 1 each by Does not apply route continuous, Disp-1 each, R-0Normal      Continuous Blood Gluc Transmit (DEXCOM G6 TRANSMITTER) MISC 1 each by Does not apply route See Admin Instructions Use  a transmnitter every three months, Disp-1 each, R-3Normal      tiotropium (SPIRIVA RESPIMAT) 2.5 MCG/ACT AERS inhaler Inhale 2 puffs into the lungs daily, Disp-1 each, R-0Normal      metFORMIN (GLUCOPHAGE-XR) 500 MG extended release tablet TAKE 1 TABLET TWICE DAILY, Disp-180 tablet, R-2Normal      montelukast (SINGULAIR) 10 MG tablet TAKE 1 TABLET EVERY DAY, Disp-90 tablet, R-1Normal      losartan (COZAAR) 100 MG tablet TAKE 1 TABLET EVERY DAY, Disp-90 tablet, R-3Normal      sulindac (CLINORIL) 200 MG tablet TAKE 1 TABLET TWICE DAILY, Disp-180 tablet, R-1Normal      carvedilol (COREG) 25 MG tablet TAKE 1 TABLET TWICE DAILY, Disp-180 tablet, R-3Normal      cyclobenzaprine (FLEXERIL) 10 mg tablet TAKE 1 TABLET TWICE DAILY AS NEEDED FOR MUSCLE SPASMS, Disp-180 tablet, R-1Normal      atorvastatin (LIPITOR) 20 MG tablet TAKE 1 TABLET EVERY DAY, Disp-90 tablet, R-1Normal      ACCU-CHEK SMARTVIEW strip TEST  ONE  TIME  DAILY, Disp-100 strip, R-3Normal      albuterol sulfate  (90 Base) MCG/ACT inhaler INHALE 2 PUFFS INTO THE LUNGS EVERY 6 HOURS AS NEEDED FOR WHEEZING OR SHORTNESS OF BREATH OR COUGH, Disp-54 g, R-1**Patient requests 90 days supply**Normal      Blood Glucose Monitoring Suppl JOSSELYN DAILY Starting Thu 6/14/2018, Disp-1 Device, R-0, Normal      traZODone (DESYREL) 50 MG tablet Take 100 mg by mouth nightly Take 2 tablets nightly, R-2Historical Med      ALPRAZolam (XANAX PO) Take 0.5 mg by mouth in the morning and at bedtimeHistorical Med      venlafaxine (EFFEXOR XR) 150 MG extended release capsule TAKE 1 CAPSULE EVERY DAY, Disp-90 capsule, R-1Normal      ACCU-CHEK FASTCLIX LANCETS MISC Disp-102 each, R-3, Normal             Social history:  reports that she has been smoking cigarettes. She started smoking about 48 years ago. She has a 22.50 pack-year smoking history.  She has never used smokeless tobacco. She reports that she does not drink alcohol and does not use drugs. Family history:    Family History   Problem Relation Age of Onset    Cancer Mother         breast cancer in her 48d    Diabetes Mother     High Blood Pressure Mother     Breast Cancer Mother     Stroke Maternal Grandmother     Cancer Paternal Grandfather         bladder cancer    Diabetes Paternal Aunt     Diabetes Paternal Grandmother          Exam  ED Triage Vitals [03/06/23 1151]   BP Temp Temp Source Heart Rate Resp SpO2 Height Weight   132/69 97.6 °F (36.4 °C) Oral 83 20 97 % 5' 6\" (1.676 m) 163 lb (73.9 kg)     Nursing note and vitals reviewed. Constitutional: Well developed, well nourished. Non-toxic in appearance. HENT:      Head: Normocephalic and atraumatic. Ears: External ears normal.      Nose: Nose normal.     Mouth: Membrane mucosa moist and pink. Posterior oropharynx injected, no tonsillar edema or exudate. Uvula midline. Speech clear and tolerating oral secretions. Eyes: Anicteric sclera. No discharge. Neck: Supple. Trachea midline. No cervical lymphadenopathy. Cardiovascular: RRR; no murmurs, rubs, or gallops. Pulmonary/Chest: Effort normal. No respiratory distress. Scattered expiratory wheezes. No stridor. No rales. Abdominal: Soft. No distension. Musculoskeletal: Moves all extremities. No gross deformity. Neurological: Alert and orientedx4. Face symmetric. Speech is clear. Skin: Warm and dry. No rash. Psychiatric: Normal mood and affect.  Behavior is normal.    Procedures      EKG    EKG was reviewed by emergency department physician in the absence of a cardiologist    Narrow complex sinus rhythm, rate 78, normal axis, normal NM and QRS intervals, normal Qtc, no ST elevations or depressions, normal t-wave morphology, impression NSR, no STEMI      Radiology  XR CHEST (2 VW)   Final Result   No acute disease             Any applicable radiology studies including x-ray, CT, MRI, and/or ultrasound, were reviewed independently by me in addition to the radiologist.  I reviewed all radiology images and reports as well from this evaluation. Labs  Results for orders placed or performed during the hospital encounter of 03/06/23   COVID-19, Rapid    Specimen: Nasopharyngeal Swab   Result Value Ref Range    SARS-CoV-2, NAAT Not Detected Not Detected   CBC with Auto Differential   Result Value Ref Range    WBC 7.5 4.0 - 11.0 K/uL    RBC 4.31 4.00 - 5.20 M/uL    Hemoglobin 13.9 12.0 - 16.0 g/dL    Hematocrit 40.7 36.0 - 48.0 %    MCV 94.4 80.0 - 100.0 fL    MCH 32.2 26.0 - 34.0 pg    MCHC 34.2 31.0 - 36.0 g/dL    RDW 13.9 12.4 - 15.4 %    Platelets 484 919 - 245 K/uL    MPV 7.5 5.0 - 10.5 fL    Neutrophils % 55.6 %    Lymphocytes % 37.7 %    Monocytes % 5.2 %    Eosinophils % 0.7 %    Basophils % 0.8 %    Neutrophils Absolute 4.1 1.7 - 7.7 K/uL    Lymphocytes Absolute 2.8 1.0 - 5.1 K/uL    Monocytes Absolute 0.4 0.0 - 1.3 K/uL    Eosinophils Absolute 0.1 0.0 - 0.6 K/uL    Basophils Absolute 0.1 0.0 - 0.2 K/uL   BMP w/ Reflex to MG   Result Value Ref Range    Sodium 137 136 - 145 mmol/L    Potassium reflex Magnesium 4.2 3.5 - 5.1 mmol/L    Chloride 105 99 - 110 mmol/L    CO2 22 21 - 32 mmol/L    Anion Gap 10 3 - 16    Glucose 178 (H) 70 - 99 mg/dL    BUN 9 7 - 20 mg/dL    Creatinine 0.6 0.6 - 1.2 mg/dL    Est, Glom Filt Rate >60 >60    Calcium 9.0 8.3 - 10.6 mg/dL   Troponin   Result Value Ref Range    Troponin <0.01 <0.01 ng/mL   EKG 12 Lead   Result Value Ref Range    Ventricular Rate 78 BPM    Atrial Rate 78 BPM    P-R Interval 202 ms    QRS Duration 80 ms    Q-T Interval 404 ms    QTc Calculation (Bazett) 460 ms    P Axis 60 degrees    R Axis 43 degrees    T Axis 91 degrees    Diagnosis Normal sinus rhythmNormal ECG        Screenings   [place] Coma Scale  Eye Opening: Spontaneous  Best Verbal Response: Oriented  Best Motor Response: Obeys commands  Aura Coma Scale Score: 15       Is this patient to be included in the SEP-1 Core Measure due to severe sepsis or septic shock?    No Exclusion criteria - the patient is NOT to be included for SEP-1 Core Measure due to: Infection is not suspected      MDM and ED Course    Patient afebrile and nontoxic. No distress. Mildly increased work of breathing, however no hypoxia on room air while in the emergency department. EKG without evidence of acute ischemia, troponin normal, no chest pain, very low suspicion for ACS. Symptoms ongoing for the past 4 to 5 days, no anticipated benefit from repeat 3-hour troponin. No malignant dysrhythmia. CXR without evidence of pneumonia, pneumothorax or other acute finding. Labs work-up is reassuring without endorgan dysfunction or clinically significant electrolyte derangement. Presentation is not consistent with CHF. Pulmonary embolism is considered, however given wheezes on exam this is not the most likely diagnosis and my clinical suspicion is low. Presentation is most consistent with exacerbation of asthma/COPD. Patient received breathing treatments and steroids. On my reevaluation she reported feeling much improved, repeat auscultation demonstrated minimal remaining wheezes. Patient ambulatory in the emergency department without distress or development of hypoxia. [place] safe for discharge to self-care with close PCP follow-up. We will continue treatment with steroids and will refill ampules for patient's nebulizer. Patient states her  is at home who will be able to watch over her. She is agreeable with plan and feels comfortable returning to home. Return precautions discussed.       During the patient's ED course, the patient was given:  Medications   predniSONE (DELTASONE) tablet 60 mg (60 mg Oral Given 3/6/23 1229)   ipratropium-albuterol (DUONEB) nebulizer solution 1 ampule (1 ampule Inhalation Given 3/6/23 1311)   albuterol (PROVENTIL) nebulizer solution 2.5 mg (2.5 mg Nebulization Given 3/6/23 1311)        Consults:  None        History obtained from: Patient    Pertinent social determinants of health: None applicable    Chronic conditions potentially affecting care:  Asthma/COPD    Review of other records: Outpatient notes by PCP office from 3/6/23 (phone call)    Reassessment:  See MDM for details of medications given and reassessment      I Dr. Artemio Manzano am the primary clinician of record. Final Impression  1. Exacerbation of intermittent asthma, unspecified asthma severity    2. Intermittent asthma with acute exacerbation, unspecified asthma severity        Blood pressure 133/61, pulse 76, temperature 97.6 °F (36.4 °C), temperature source Oral, resp. rate 16, height 5' 6\" (1.676 m), weight 163 lb (73.9 kg), SpO2 95 %, not currently breastfeeding. Disposition:  DISPOSITION Decision To Discharge 03/06/2023 02:13:21 PM      Patient Referrals:  Marbin Man MD  [address] [facility] [address] [phone]    In 2 days        Discharge Medications:  Discharge Medication List as of 3/6/2023  2:41 PM        START taking these medications    Details   cetirizine (ZYRTEC) 10 MG tablet Take 1 tablet by mouth daily, Disp-30 tablet, R-0Print      predniSONE (DELTASONE) 20 MG tablet Take 2 tablets by mouth daily for 4 days Take your first dose of this prescription on 3/7/23, Disp-8 tablet, R-0Print             Discontinued Medications:  Discharge Medication List as of 3/6/2023  2:41 PM          This chart was generated using the [address] [place] [address] dictation system. I created this record but it may contain dictation errors given the limitations of this technology.     Frankey Rider, DO (electronically signed)  Attending Emergency Physician       Frankey Rider, DO  03/07/23 4991

## 2023-03-10 ENCOUNTER — OFFICE VISIT (OUTPATIENT)
Dept: FAMILY MEDICINE CLINIC | Age: 63
End: 2023-03-10

## 2023-03-10 VITALS
WEIGHT: 164 LBS | HEIGHT: 66 IN | OXYGEN SATURATION: 94 % | DIASTOLIC BLOOD PRESSURE: 82 MMHG | BODY MASS INDEX: 26.36 KG/M2 | TEMPERATURE: 97 F | SYSTOLIC BLOOD PRESSURE: 128 MMHG | HEART RATE: 80 BPM

## 2023-03-10 DIAGNOSIS — J45.901 ASTHMA WITH ACUTE EXACERBATION, UNSPECIFIED ASTHMA SEVERITY, UNSPECIFIED WHETHER PERSISTENT: Primary | ICD-10-CM

## 2023-03-10 RX ORDER — PREDNISONE 10 MG/1
TABLET ORAL
Qty: 10 TABLET | Refills: 0 | Status: SHIPPED | OUTPATIENT
Start: 2023-03-10

## 2023-03-10 NOTE — PROGRESS NOTES
Subjective:      Patient ID: Kain Christianson is a 57 y.o. female. Chief Complaint   Patient presents with    Follow-Up from [facility] 3-6-2023 \"low oxygen level\"        Patient presents with: Follow-Up from Hospital: [facility] 3-6-2023 \"low oxygen level\"    Had some flare of copd/asthma  She had called pulmonary  She went to ER  She had low O2    Dry cough no fever  Breathing ok   No sore throat  Ears ok     YOB: 1960    Date of Visit:  3/10/2023     -- Azithromycin -- Itching, Rash, Other (See Comments)                           and Swelling    --  Other reaction(s): Other (See Comments)             FEVER             FEVER   -- Baclofen -- Nausea And Vomiting, Itching and                            Rash   -- Nuts [Peanut Oil] -- Anaphylaxis   -- Peanut-Containing Drug Products -- Anaphylaxis   -- Sulfa Antibiotics -- Anaphylaxis and Rash   -- Adalimumab -- Other (See Comments)    --  \"immune system crashes\"   -- Avelox [Moxifloxacin Hcl In Nacl]    -- Bactrim    -- Belbuca [Buprenorphine Hcl] -- Itching and Other (See Comments)    --  Headaches, insomina   -- Infliximab -- Other (See Comments)    --  \"immune system crashes\"   -- Lisinopril -- Other (See Comments)    --  Cough   -- Zithromax [Azithromycin Dihydrate]    -- Adhesive Tape -- Rash    --  PAPER TAPE IS OK             PAPER TAPE IS OK   -- Moxifloxacin -- Rash, Other (See Comments) and                            Swelling    --  Other reaction(s): Other (See Comments)             FEVER             FEVER             Other reaction(s): Fever   -- Sulfamethoxazole-Trimethoprim -- Other (See Comments), Rash and                            Swelling    --  Other reaction(s):  Other (See Comments)             FEVER             FEVER    Current Outpatient Medications:  cetirizine (ZYRTEC) 10 MG tablet, Take 1 tablet by mouth daily, Disp: 30 tablet, Rfl: 0  predniSONE (DELTASONE) 20 MG tablet, Take 2 tablets by mouth daily for 4 days Take your first dose of this prescription on 3/7/23, Disp: 8 tablet, Rfl: 0  albuterol (PROVENTIL) (2.5 MG/3ML) 0.083% nebulizer solution, Use one vial every 6 hours in nebulizer as needed, Disp: 120 each, Rfl: 0  Continuous Blood Gluc Sensor (DEXCOM G6 SENSOR) MISC, 1 each by Does not apply route See Admin Instructions Use one sensor every 10 days, Disp: 3 each, Rfl: 2  Continuous Blood Gluc  (DEXCOM G6 ) JOSSELYN, 1 each by Does not apply route continuous, Disp: 1 each, Rfl: 0  Continuous Blood Gluc Transmit (DEXCOM G6 TRANSMITTER) MISC, 1 each by Does not apply route See Admin Instructions Use  a transmnitter every three months, Disp: 1 each, Rfl: 3  tiotropium (SPIRIVA RESPIMAT) 2.5 MCG/ACT AERS inhaler, Inhale 2 puffs into the lungs daily, Disp: 1 each, Rfl: 0  metFORMIN (GLUCOPHAGE-XR) 500 MG extended release tablet, TAKE 1 TABLET TWICE DAILY, Disp: 180 tablet, Rfl: 2  montelukast (SINGULAIR) 10 MG tablet, TAKE 1 TABLET EVERY DAY, Disp: 90 tablet, Rfl: 1  losartan (COZAAR) 100 MG tablet, TAKE 1 TABLET EVERY DAY, Disp: 90 tablet, Rfl: 3  sulindac (CLINORIL) 200 MG tablet, TAKE 1 TABLET TWICE DAILY, Disp: 180 tablet, Rfl: 1  carvedilol (COREG) 25 MG tablet, TAKE 1 TABLET TWICE DAILY, Disp: 180 tablet, Rfl: 3  cyclobenzaprine (FLEXERIL) 10 mg tablet, TAKE 1 TABLET TWICE DAILY AS NEEDED FOR MUSCLE SPASMS, Disp: 180 tablet, Rfl: 1  atorvastatin (LIPITOR) 20 MG tablet, TAKE 1 TABLET EVERY DAY, Disp: 90 tablet, Rfl: 1  ACCU-CHEK SMARTVIEW strip, TEST  ONE  TIME  DAILY, Disp: 100 strip, Rfl: 3  albuterol sulfate  (90 Base) MCG/ACT inhaler, INHALE 2 PUFFS INTO THE LUNGS EVERY 6 HOURS AS NEEDED FOR WHEEZING OR SHORTNESS OF BREATH OR COUGH, Disp: 54 g, Rfl: 1  Blood Glucose Monitoring Suppl JOSSELYN, 1 each by Does not apply route daily, Disp: 1 Device, Rfl: 0  traZODone (DESYREL) 50 MG tablet, Take 100 mg by mouth nightly Take 2 tablets nightly, Disp: , Rfl: 2  ALPRAZolam (XANAX PO), Take 0.5 mg by mouth in the morning and at bedtime, Disp: , Rfl:   venlafaxine (EFFEXOR XR) 150 MG extended release capsule, TAKE 1 CAPSULE EVERY DAY, Disp: 90 capsule, Rfl: 1  ACCU-CHEK FASTCLIX LANCETS MISC, TEST ONE TIME DAILY, Disp: 102 each, Rfl: 3     No current facility-administered medications for this visit.      --------------------------               03/10/23                     1125        --------------------------   BP:          128/82        Site:    Left Upper Arm    Position:    Sitting        Cuff Size:  Medium Adult     Pulse:         80          Temp:   97 °F (36.1 °C)    TempSrc:    Temporal       SpO2:         94%          Weight: 164 lb (74.4 kg)   Height: 5' 6\" (1.676 m)   --------------------------  Body mass index is 26.47 kg/m². Wt Readings from Last 3 Encounters:  03/10/23 : 164 lb (74.4 kg)  03/06/23 : 163 lb (73.9 kg)  03/02/23 : 165 lb 6.4 oz (75 kg)    BP Readings from Last 3 Encounters:  03/10/23 : 128/82  03/06/23 : 133/61  03/02/23 : 124/80            Review of Systems    Objective:   Physical Exam  Constitutional:       General: She is not in acute distress. Appearance: Normal appearance. She is well-developed. She is not ill-appearing or diaphoretic. HENT:      Head: Normocephalic and atraumatic. Mouth/Throat:      Lips: Pink. Mouth: Mucous membranes are moist.      Pharynx: Oropharynx is clear. Cardiovascular:      Rate and Rhythm: Normal rate and regular rhythm. Heart sounds: Normal heart sounds. No murmur heard. No friction rub. No gallop. Pulmonary:      Effort: Pulmonary effort is normal. No tachypnea, accessory muscle usage or respiratory distress. Breath sounds: Normal breath sounds. No decreased breath sounds, wheezing, rhonchi or rales. Comments: She had some end exp wheezing   Air movement is good   She is in no distress  Musculoskeletal:      Cervical back: Neck supple.    Lymphadenopathy: Cervical: No cervical adenopathy. Upper Body:      Right upper body: No supraclavicular adenopathy. Left upper body: No supraclavicular adenopathy. Skin:     General: Skin is warm and dry. Coloration: Skin is not pale. Neurological:      Mental Status: She is alert. Assessment:       Diagnosis Orders   1. Asthma with acute exacerbation, unspecified asthma severity, unspecified whether persistent  TX NONINVASIVE EAR/PULSE OXIMETRY SINGLE DETER          Orders Placed This Encounter   Medications    predniSONE (DELTASONE) 10 MG tablet     Sig: One po daily for the next 3 days. Dispense:  10 tablet     Refill:  0     She will have some left over on the script.  Intentional       She is almost off the prednisone  She tapered little more than the ER told her   She used 20 mg daily today and tomorrow then she will be off  I will continue 10 mg daily for next 3 days     Treated for exacerbation of the asthma and on prednisone on 3/6/23  Cxr was normal bmp and cbc is ok       Plan:      Continue the steroid on the new prescription after tomorrow  Call me for any concern        Lenny Masterson MD

## 2023-03-14 ENCOUNTER — OFFICE VISIT (OUTPATIENT)
Dept: PULMONOLOGY | Age: 63
End: 2023-03-14
Payer: MEDICARE

## 2023-03-14 VITALS
SYSTOLIC BLOOD PRESSURE: 120 MMHG | HEART RATE: 79 BPM | RESPIRATION RATE: 16 BRPM | HEIGHT: 66 IN | TEMPERATURE: 96.9 F | BODY MASS INDEX: 26.68 KG/M2 | OXYGEN SATURATION: 96 % | WEIGHT: 166 LBS | DIASTOLIC BLOOD PRESSURE: 70 MMHG

## 2023-03-14 DIAGNOSIS — G47.33 OBSTRUCTIVE SLEEP APNEA SYNDROME: ICD-10-CM

## 2023-03-14 DIAGNOSIS — J44.9 CHRONIC OBSTRUCTIVE PULMONARY DISEASE, UNSPECIFIED COPD TYPE (HCC): Primary | ICD-10-CM

## 2023-03-14 DIAGNOSIS — Z87.891 PERSONAL HISTORY OF TOBACCO USE: ICD-10-CM

## 2023-03-14 PROCEDURE — G8419 CALC BMI OUT NRM PARAM NOF/U: HCPCS | Performed by: INTERNAL MEDICINE

## 2023-03-14 PROCEDURE — G8482 FLU IMMUNIZE ORDER/ADMIN: HCPCS | Performed by: INTERNAL MEDICINE

## 2023-03-14 PROCEDURE — 4004F PT TOBACCO SCREEN RCVD TLK: CPT | Performed by: INTERNAL MEDICINE

## 2023-03-14 PROCEDURE — 3017F COLORECTAL CA SCREEN DOC REV: CPT | Performed by: INTERNAL MEDICINE

## 2023-03-14 PROCEDURE — 3074F SYST BP LT 130 MM HG: CPT | Performed by: INTERNAL MEDICINE

## 2023-03-14 PROCEDURE — 3023F SPIROM DOC REV: CPT | Performed by: INTERNAL MEDICINE

## 2023-03-14 PROCEDURE — 3078F DIAST BP <80 MM HG: CPT | Performed by: INTERNAL MEDICINE

## 2023-03-14 PROCEDURE — 99214 OFFICE O/P EST MOD 30 MIN: CPT | Performed by: INTERNAL MEDICINE

## 2023-03-14 PROCEDURE — G8427 DOCREV CUR MEDS BY ELIG CLIN: HCPCS | Performed by: INTERNAL MEDICINE

## 2023-03-14 PROCEDURE — G0296 VISIT TO DETERM LDCT ELIG: HCPCS | Performed by: INTERNAL MEDICINE

## 2023-03-14 ASSESSMENT — ENCOUNTER SYMPTOMS: SHORTNESS OF BREATH: 1

## 2023-03-14 NOTE — ASSESSMENT & PLAN NOTE
- Diagnosed several years ago, was unable to to remain compliant with CPAP could not tolerate.   Patient states she used her pulse oximetry overnight and there were no documented desaturation episodes

## 2023-03-14 NOTE — ASSESSMENT & PLAN NOTE
PFTs with moderate COPD, FEV1 71%  -On Stiolto due to get it free through [email or web address].   There is no LAMA/LABA on this list.  If this is insufficient to control symptoms may need to escalate to Breztri/Trelegy  -Albuterol, Singulair

## 2023-03-14 NOTE — PATIENT INSTRUCTIONS

## 2023-03-14 NOTE — PROGRESS NOTES
[address] [facility] (:  1960) is a 57 y.o. female,New patient, here for evaluation of the following chief complaint(s):  Follow-Up from Hospital         ASSESSMENT/PLAN:  1. Chronic obstructive pulmonary disease, unspecified COPD type ([address]  Assessment & Plan:  PFTs with moderate COPD, FEV1 71%  -On Stiolto due to get it free through [facility]. There is no LAMA/LABA on this list.  If this is insufficient to control symptoms may need to escalate to Breztri/Trelegy  -Albuterol, Singulair  2. Obstructive sleep apnea syndrome  Assessment & Plan:   - Diagnosed several years ago, was unable to to remain compliant with CPAP could not tolerate. Patient states she used her pulse oximetry overnight and there were no documented desaturation episodes  3. Personal history of tobacco use  Assessment & Plan:  -Cut way back. Smoked 2 cigarettes in the past week. -Due for yearly LDCT this summer when I will see her back. Orders:  -     PA VISIT TO DISCUSS LUNG CA SCREEN W LDCT  -     CT Lung Screen (Annual/Baseline); Future      Return in about 4 months (around 2023). Future Appointments   Date Time Provider Shola Mitchelli   2023  1:15 PM MD Karis Rodriguez [facility]   2023  1:20 PM MD Conner Fu [place] [facility]   3/5/2024 11:30 AM SCHEDULE, [facility] - [facility] [place] [facility]            Subjective   SUBJECTIVE/OBJECTIVE:  57-year-old current everyday smoker with approximately 30-pack-year smoking history presents for follow-up centrilobular emphysema. Since last visit she had a LDCT that showed punctate nodules but otherwise just some mild emphysema. Last visit she tried Stiolto but developed glossitis. Review of Systems   Constitutional: Negative. Respiratory:  Positive for shortness of breath. Cardiovascular: Negative. Musculoskeletal: Negative. Neurological: Negative. Psychiatric/Behavioral: Negative. Objective   Physical Exam     Gen:  No acute distress. Eyes: PERRL. EOMI. Anicteric sclera. No conjunctival injection. ENT: No discharge. oropharynx clear. External appearance of ears and nose normal.  Neck: Trachea midline. No mass   Resp:  No crackles. No wheezes. No rhonchi. No dullness on percussion. CV: Regular rate. Regular rhythm. No murmur or rub. No edema. GI: Soft, Non-tender. Non-distended. +BS  Skin: Warm, dry, w/o erythema. Lymph: No cervical or supraclavicular LAD. M/S: No cyanosis. No clubbing. Neuro:  no focal neurologic deficit. Moves all extremities  Psych: Awake and alert, Oriented x 3. Judgement and insight appropriate. Mood stable. CT chest images reviewed personally by me, interpretation as follows:  -Mild centrilobular emphysema upper lobe predominant. Punctate nodules bilaterally less than 4 mm in diameter. An electronic signature was used to authenticate this note. --Lino Fields MD       Discussed with the patient the current USPSTF guidelines released March 9, 2021 for screening for lung cancer. For adults aged 48 to [de-identified] years who have a 20 pack-year smoking history and currently smoke or have quit within the past 15 years the grade B recommendation is to:  Screen for lung cancer with low-dose computed tomography (LDCT) every year. Stop screening once a person has not smoked for 15 years or has a health problem that limits life expectancy or the ability to have lung surgery. The patient  reports that she has been smoking cigarettes. She started smoking about 48 years ago. She has a 22.50 pack-year smoking history. She has never used smokeless tobacco.. Discussed with patient the risks and benefits of screening, including over-diagnosis, false positive rate, and total radiation exposure. The patient currently exhibits no signs or symptoms suggestive of lung cancer.   Discussed with patient the importance of compliance with yearly annual lung cancer screenings and willingness to undergo diagnosis and treatment if screening scan is positive. In addition, the patient was counseled regarding the importance of remaining smoke free and/or total smoking cessation.     Also reviewed the following if the patient has Medicare that as of February 10, 2022, Medicare only covers LDCT screening in patients aged 51-72 with at least a 20 pack-year smoking history who currently smoke or have quit in the last 15 years

## 2023-03-14 NOTE — ASSESSMENT & PLAN NOTE
-Cut way back. Smoked 2 cigarettes in the past week. -Due for yearly LDCT this summer when I will see her back.

## 2023-03-23 ENCOUNTER — PATIENT MESSAGE (OUTPATIENT)
Dept: PULMONOLOGY | Age: 63
End: 2023-03-23

## 2023-03-23 DIAGNOSIS — J44.9 CHRONIC OBSTRUCTIVE PULMONARY DISEASE, UNSPECIFIED COPD TYPE (HCC): Primary | ICD-10-CM

## 2023-03-23 NOTE — TELEPHONE ENCOUNTER
From: Lexie Hobbs  To: Dr. Francisco Glass: 3/23/2023 3:17 PM EDT  Subject: meds approved by [facility]    here are the inhalers that are approved by [facility] this year

## 2023-03-31 ENCOUNTER — TELEPHONE (OUTPATIENT)
Dept: FAMILY MEDICINE CLINIC | Age: 63
End: 2023-03-31

## 2023-03-31 NOTE — TELEPHONE ENCOUNTER
----- Message from Reina Sears sent at 3/31/2023 12:40 PM EDT -----  Regarding: jury duty  I have been summoned to jury duty on May 1,2023 and I cannot sit that long due to my back. Could you please fax a note to [phone]. Beverly Poe  [phone]  [phone]    Thank you.

## 2023-03-31 NOTE — LETTER
March 31, 2023       Mae Ramirez YOB: 1960   [place] [address] Date of Visit:  3/31/2023       To Whom It May Concern: It is my medical opinion that Laura Kay is unable to perform jury duty at this time. If you have any questions or concerns, please don't hesitate to call.     Sincerely,        Bonnie Garza MD

## 2023-04-19 ENCOUNTER — OFFICE VISIT (OUTPATIENT)
Dept: CARDIOLOGY CLINIC | Age: 63
End: 2023-04-19
Payer: MEDICARE

## 2023-04-19 VITALS
HEART RATE: 79 BPM | OXYGEN SATURATION: 95 % | HEIGHT: 66 IN | DIASTOLIC BLOOD PRESSURE: 82 MMHG | BODY MASS INDEX: 26.84 KG/M2 | SYSTOLIC BLOOD PRESSURE: 126 MMHG | WEIGHT: 167 LBS

## 2023-04-19 DIAGNOSIS — F17.218 CIGARETTE NICOTINE DEPENDENCE WITH OTHER NICOTINE-INDUCED DISORDER: ICD-10-CM

## 2023-04-19 DIAGNOSIS — I35.8 AORTIC VALVE SCLEROSIS: ICD-10-CM

## 2023-04-19 DIAGNOSIS — I10 ESSENTIAL HYPERTENSION: ICD-10-CM

## 2023-04-19 DIAGNOSIS — I35.1 NON-RHEUMATIC AORTIC REGURGITATION: ICD-10-CM

## 2023-04-19 DIAGNOSIS — I25.84 CORONARY ARTERY CALCIFICATION: ICD-10-CM

## 2023-04-19 DIAGNOSIS — I25.10 CORONARY ARTERY CALCIFICATION: ICD-10-CM

## 2023-04-19 DIAGNOSIS — I42.8 NICM (NONISCHEMIC CARDIOMYOPATHY) (HCC): Primary | ICD-10-CM

## 2023-04-19 PROCEDURE — 3017F COLORECTAL CA SCREEN DOC REV: CPT | Performed by: INTERNAL MEDICINE

## 2023-04-19 PROCEDURE — G8427 DOCREV CUR MEDS BY ELIG CLIN: HCPCS | Performed by: INTERNAL MEDICINE

## 2023-04-19 PROCEDURE — 3074F SYST BP LT 130 MM HG: CPT | Performed by: INTERNAL MEDICINE

## 2023-04-19 PROCEDURE — 99214 OFFICE O/P EST MOD 30 MIN: CPT | Performed by: INTERNAL MEDICINE

## 2023-04-19 PROCEDURE — 3079F DIAST BP 80-89 MM HG: CPT | Performed by: INTERNAL MEDICINE

## 2023-04-19 PROCEDURE — 4004F PT TOBACCO SCREEN RCVD TLK: CPT | Performed by: INTERNAL MEDICINE

## 2023-04-19 PROCEDURE — G8419 CALC BMI OUT NRM PARAM NOF/U: HCPCS | Performed by: INTERNAL MEDICINE

## 2023-04-19 NOTE — PROGRESS NOTES
Reactions    Azithromycin Itching, Rash, Other (See Comments) and Swelling     Other reaction(s): Other (See Comments)  FEVER  FEVER    Baclofen Nausea And Vomiting, Itching and Rash    Nuts [Peanut Oil] Anaphylaxis    Peanut-Containing Drug Products Anaphylaxis    Sulfa Antibiotics Anaphylaxis and Rash    Adalimumab Other (See Comments)     \"immune system crashes\"    Avelox [Moxifloxacin Hcl In Nacl]     Bactrim     Belbuca [Buprenorphine Hcl] Itching and Other (See Comments)     Headaches, insomina    Infliximab Other (See Comments)     \"immune system crashes\"    Lisinopril Other (See Comments)     Cough    Zithromax [Azithromycin Dihydrate]     Adhesive Tape Rash     PAPER TAPE IS OK  PAPER TAPE IS OK    Moxifloxacin Rash, Other (See Comments) and Swelling     Other reaction(s): Other (See Comments)  FEVER  FEVER  Other reaction(s): Fever    Sulfamethoxazole-Trimethoprim Other (See Comments), Rash and Swelling     Other reaction(s):  Other (See Comments)  FEVER  FEVER     Current Outpatient Medications   Medication Sig Dispense Refill    tiotropium-olodaterol (STIOLTO RESPIMAT) 2.5-2.5 MCG/ACT AERS Inhale 2 puffs into the lungs daily 3 each 3    albuterol (PROVENTIL) (2.5 MG/3ML) 0.083% nebulizer solution Use one vial every 6 hours in nebulizer as needed 120 each 0    Continuous Blood Gluc Sensor (DEXCOM G6 SENSOR) MISC 1 each by Does not apply route See Admin Instructions Use one sensor every 10 days 3 each 2    Continuous Blood Gluc  (DEXCOM G6 ) JOSSELYN 1 each by Does not apply route continuous 1 each 0    Continuous Blood Gluc Transmit (DEXCOM G6 TRANSMITTER) MISC 1 each by Does not apply route See Admin Instructions Use  a transmnitter every three months 1 each 3    metFORMIN (GLUCOPHAGE-XR) 500 MG extended release tablet TAKE 1 TABLET TWICE DAILY 180 tablet 2    montelukast (SINGULAIR) 10 MG tablet TAKE 1 TABLET EVERY DAY 90 tablet 1    losartan (COZAAR) 100 MG tablet TAKE 1 TABLET EVERY DAY 90

## 2023-04-21 ENCOUNTER — TELEPHONE (OUTPATIENT)
Dept: CARDIOLOGY CLINIC | Age: 63
End: 2023-04-21

## 2023-04-21 RX ORDER — CYCLOBENZAPRINE HCL 10 MG
TABLET ORAL
Qty: 180 TABLET | Refills: 1 | Status: SHIPPED | OUTPATIENT
Start: 2023-04-21

## 2023-04-21 RX ORDER — ATORVASTATIN CALCIUM 20 MG/1
TABLET, FILM COATED ORAL
Qty: 90 TABLET | Refills: 1 | Status: SHIPPED | OUTPATIENT
Start: 2023-04-21

## 2023-05-08 ENCOUNTER — TELEPHONE (OUTPATIENT)
Dept: FAMILY MEDICINE CLINIC | Age: 63
End: 2023-05-08

## 2023-05-08 NOTE — TELEPHONE ENCOUNTER
----- Message from Govind Sears sent at 5/8/2023  9:58 AM EDT -----  Regarding: Metformin   Contact: [phone]  Dr Yamil Moya put me on Januvia on May 1. My blood sugar has been over 200 and yesterday it was 345. Sat night I took and metformin,  and talked to on call doctor yesterday. She said to start taking my metformin twice a day again . My blood sugar was 155 this morning .  Should I be doing anything else

## 2023-05-11 ENCOUNTER — OFFICE VISIT (OUTPATIENT)
Dept: FAMILY MEDICINE CLINIC | Age: 63
End: 2023-05-11

## 2023-05-11 VITALS
BODY MASS INDEX: 26.36 KG/M2 | HEIGHT: 66 IN | TEMPERATURE: 97.3 F | HEART RATE: 88 BPM | DIASTOLIC BLOOD PRESSURE: 68 MMHG | SYSTOLIC BLOOD PRESSURE: 110 MMHG | WEIGHT: 164 LBS

## 2023-05-11 DIAGNOSIS — I10 ESSENTIAL HYPERTENSION: ICD-10-CM

## 2023-05-11 DIAGNOSIS — E11.9 TYPE 2 DIABETES MELLITUS WITHOUT COMPLICATION, WITHOUT LONG-TERM CURRENT USE OF INSULIN (HCC): ICD-10-CM

## 2023-05-11 DIAGNOSIS — E11.9 TYPE 2 DIABETES MELLITUS WITHOUT COMPLICATION, WITHOUT LONG-TERM CURRENT USE OF INSULIN (HCC): Primary | ICD-10-CM

## 2023-05-11 DIAGNOSIS — I42.8 NICM (NONISCHEMIC CARDIOMYOPATHY) (HCC): ICD-10-CM

## 2023-05-11 LAB
ANION GAP SERPL CALCULATED.3IONS-SCNC: 14 MMOL/L (ref 3–16)
BUN SERPL-MCNC: 16 MG/DL (ref 7–20)
CALCIUM SERPL-MCNC: 9.6 MG/DL (ref 8.3–10.6)
CHLORIDE SERPL-SCNC: 103 MMOL/L (ref 99–110)
CO2 SERPL-SCNC: 20 MMOL/L (ref 21–32)
CREAT SERPL-MCNC: 0.9 MG/DL (ref 0.6–1.2)
GFR SERPLBLD CREATININE-BSD FMLA CKD-EPI: >60 ML/MIN/{1.73_M2}
GLUCOSE SERPL-MCNC: 161 MG/DL (ref 70–99)
POTASSIUM SERPL-SCNC: 4.8 MMOL/L (ref 3.5–5.1)
SODIUM SERPL-SCNC: 137 MMOL/L (ref 136–145)

## 2023-05-11 RX ORDER — METFORMIN HYDROCHLORIDE 500 MG/1
500 TABLET, EXTENDED RELEASE ORAL 2 TIMES DAILY
COMMUNITY

## 2023-05-11 NOTE — PATIENT INSTRUCTIONS
The metformin should start to work in your system in the next week  Do check the labs today  See me in 3 months   Do consider the shingle vaccine

## 2023-05-11 NOTE — PROGRESS NOTES
Subjective:      Patient ID: Caterina Claros is a 57 y.o. female. Chief Complaint   Patient presents with    Diabetes     Discuss medication        Patient presents with:  Diabetes: Discuss medication    She is here for the above  She is overall well no c/o  She wanted to know more about the numbers    YOB: 1960    Date of Visit:  5/11/2023     -- Azithromycin -- Itching, Rash, Other (See Comments)                           and Swelling    --  Other reaction(s): Other (See Comments)             FEVER             FEVER   -- Baclofen -- Nausea And Vomiting, Itching and                            Rash   -- Nuts [Peanut Oil] -- Anaphylaxis   -- Peanut-Containing Drug Products -- Anaphylaxis   -- Sulfa Antibiotics -- Anaphylaxis and Rash   -- Adalimumab -- Other (See Comments)    --  \"immune system crashes\"   -- Avelox [Moxifloxacin Hcl In Nacl]    -- Bactrim    -- Belbuca [Buprenorphine Hcl] -- Itching and Other (See Comments)    --  Headaches, insomina   -- Infliximab -- Other (See Comments)    --  \"immune system crashes\"   -- Lisinopril -- Other (See Comments)    --  Cough   -- Zithromax [Azithromycin Dihydrate]    -- Adhesive Tape -- Rash    --  PAPER TAPE IS OK             PAPER TAPE IS OK   -- Moxifloxacin -- Rash, Other (See Comments) and                            Swelling    --  Other reaction(s): Other (See Comments)             FEVER             FEVER             Other reaction(s): Fever   -- Sulfamethoxazole-Trimethoprim -- Other (See Comments), Rash and                            Swelling    --  Other reaction(s):  Other (See Comments)             FEVER             FEVER    Current Outpatient Medications:  metFORMIN (GLUCOPHAGE-XR) 500 MG extended release tablet, Take 1 tablet by mouth in the morning and at bedtime, Disp: , Rfl:   atorvastatin (LIPITOR) 20 MG tablet, TAKE 1 TABLET EVERY DAY, Disp: 90 tablet, Rfl: 1  cyclobenzaprine (FLEXERIL) 10 MG tablet, TAKE 1 TABLET TWICE DAILY AS

## 2023-05-12 LAB
EST. AVERAGE GLUCOSE BLD GHB EST-MCNC: 197.3 MG/DL
HBA1C MFR BLD: 8.5 %

## 2023-06-02 ENCOUNTER — OFFICE VISIT (OUTPATIENT)
Dept: FAMILY MEDICINE CLINIC | Age: 63
End: 2023-06-02

## 2023-06-02 VITALS
TEMPERATURE: 97.3 F | HEIGHT: 66 IN | BODY MASS INDEX: 26.52 KG/M2 | DIASTOLIC BLOOD PRESSURE: 82 MMHG | SYSTOLIC BLOOD PRESSURE: 128 MMHG | WEIGHT: 165 LBS | HEART RATE: 76 BPM

## 2023-06-02 DIAGNOSIS — H02.403 PTOSIS OF BOTH EYELIDS: ICD-10-CM

## 2023-06-02 DIAGNOSIS — E11.9 TYPE 2 DIABETES MELLITUS WITHOUT COMPLICATION, WITHOUT LONG-TERM CURRENT USE OF INSULIN (HCC): ICD-10-CM

## 2023-06-02 DIAGNOSIS — Z01.818 PREOP EXAMINATION: ICD-10-CM

## 2023-06-02 DIAGNOSIS — M06.9 RHEUMATOID ARTHRITIS, INVOLVING UNSPECIFIED SITE, UNSPECIFIED WHETHER RHEUMATOID FACTOR PRESENT (HCC): ICD-10-CM

## 2023-06-02 DIAGNOSIS — I10 ESSENTIAL HYPERTENSION: ICD-10-CM

## 2023-06-02 DIAGNOSIS — I42.8 NICM (NONISCHEMIC CARDIOMYOPATHY) (HCC): ICD-10-CM

## 2023-06-02 DIAGNOSIS — Z01.818 PREOP EXAMINATION: Primary | ICD-10-CM

## 2023-06-02 LAB
ALBUMIN SERPL-MCNC: 4.7 G/DL (ref 3.4–5)
ALBUMIN/GLOB SERPL: 1.8 {RATIO} (ref 1.1–2.2)
ALP SERPL-CCNC: 158 U/L (ref 40–129)
ALT SERPL-CCNC: 16 U/L (ref 10–40)
ANION GAP SERPL CALCULATED.3IONS-SCNC: 14 MMOL/L (ref 3–16)
AST SERPL-CCNC: 19 U/L (ref 15–37)
BASOPHILS # BLD: 0 K/UL (ref 0–0.2)
BASOPHILS NFR BLD: 0.6 %
BILIRUB SERPL-MCNC: 0.3 MG/DL (ref 0–1)
BUN SERPL-MCNC: 12 MG/DL (ref 7–20)
CALCIUM SERPL-MCNC: 9.6 MG/DL (ref 8.3–10.6)
CHLORIDE SERPL-SCNC: 103 MMOL/L (ref 99–110)
CO2 SERPL-SCNC: 22 MMOL/L (ref 21–32)
CREAT SERPL-MCNC: 0.9 MG/DL (ref 0.6–1.2)
DEPRECATED RDW RBC AUTO: 14.1 % (ref 12.4–15.4)
EOSINOPHIL # BLD: 0.1 K/UL (ref 0–0.6)
EOSINOPHIL NFR BLD: 1.4 %
GFR SERPLBLD CREATININE-BSD FMLA CKD-EPI: >60 ML/MIN/{1.73_M2}
GLUCOSE SERPL-MCNC: 92 MG/DL (ref 70–99)
HCT VFR BLD AUTO: 45.3 % (ref 36–48)
HGB BLD-MCNC: 15.4 G/DL (ref 12–16)
LYMPHOCYTES # BLD: 3.5 K/UL (ref 1–5.1)
LYMPHOCYTES NFR BLD: 50.8 %
MCH RBC QN AUTO: 32.9 PG (ref 26–34)
MCHC RBC AUTO-ENTMCNC: 33.9 G/DL (ref 31–36)
MCV RBC AUTO: 96.9 FL (ref 80–100)
MONOCYTES # BLD: 0.4 K/UL (ref 0–1.3)
MONOCYTES NFR BLD: 5.2 %
NEUTROPHILS # BLD: 2.9 K/UL (ref 1.7–7.7)
NEUTROPHILS NFR BLD: 42 %
PLATELET # BLD AUTO: 182 K/UL (ref 135–450)
PMV BLD AUTO: 8 FL (ref 5–10.5)
POTASSIUM SERPL-SCNC: 4.4 MMOL/L (ref 3.5–5.1)
PROT SERPL-MCNC: 7.3 G/DL (ref 6.4–8.2)
RBC # BLD AUTO: 4.68 M/UL (ref 4–5.2)
SODIUM SERPL-SCNC: 139 MMOL/L (ref 136–145)
WBC # BLD AUTO: 6.9 K/UL (ref 4–11)

## 2023-06-02 RX ORDER — METFORMIN HYDROCHLORIDE 500 MG/1
500 TABLET, EXTENDED RELEASE ORAL 3 TIMES DAILY
Qty: 270 TABLET | Refills: 1 | Status: SHIPPED | OUTPATIENT
Start: 2023-06-02

## 2023-06-02 ASSESSMENT — ENCOUNTER SYMPTOMS
NAUSEA: 0
TROUBLE SWALLOWING: 0
ABDOMINAL PAIN: 0
DIARRHEA: 0
SHORTNESS OF BREATH: 0
VOICE CHANGE: 0
BLOOD IN STOOL: 0
CHEST TIGHTNESS: 0
SORE THROAT: 0
VOMITING: 0
CONSTIPATION: 0
EYE DISCHARGE: 0
EYE PAIN: 0
COUGH: 0
BACK PAIN: 0
ABDOMINAL DISTENTION: 0

## 2023-06-02 NOTE — PROGRESS NOTES
Subjective:      Patient ID: Cecile Renteria is a 57 y.o. female. Chief Complaint   Patient presents with    Pre-op Exam     Bilateral Upper Eyelid Blepharoplasty on 6- by Dr. Mirza Heredia at [facility]. Patient presents with:  Pre-op Exam: Bilateral Upper Eyelid Blepharoplasty on 6- by Dr. Mirza Heredia at [facility]. Here for the above  She is well  Has had difficulty with her vision  Glucose over the last two weeks her glucose has been coming down compared to earlier   Her month average 151 and last week is 125       Allergies:   -- Azithromycin -- Itching, Rash, Other (See Comments)                           and Swelling    --  Other reaction(s): Other (See Comments)             FEVER             FEVER   -- Baclofen -- Nausea And Vomiting, Itching and                            Rash   -- Nuts [Peanut Oil] -- Anaphylaxis   -- Peanut-Containing Drug Products -- Anaphylaxis   -- Sulfa Antibiotics -- Anaphylaxis and Rash   -- Adalimumab -- Other (See Comments)    --  \"immune system crashes\"   -- Avelox [Moxifloxacin Hcl In Nacl]    -- Bactrim    -- Belbuca [Buprenorphine Hcl] -- Itching and Other (See Comments)    --  Headaches, insomina   -- Infliximab -- Other (See Comments)    --  \"immune system crashes\"   -- Lisinopril -- Other (See Comments)    --  Cough   -- Zithromax [Azithromycin Dihydrate]    -- Adhesive Tape -- Rash    --  PAPER TAPE IS OK             PAPER TAPE IS OK   -- Moxifloxacin -- Rash, Other (See Comments) and                            Swelling    --  Other reaction(s): Other (See Comments)             FEVER             FEVER             Other reaction(s): Fever   -- Sulfamethoxazole-Trimethoprim -- Other (See Comments), Rash and                            Swelling    --  Other reaction(s): Other (See Comments)             FEVER     height is 5' 6\" (1.676 m) and weight is 165 lb (74.8 kg).  Her temporal

## 2023-06-03 LAB
EST. AVERAGE GLUCOSE BLD GHB EST-MCNC: 168.6 MG/DL
HBA1C MFR BLD: 7.5 %

## 2023-06-19 ENCOUNTER — TELEPHONE (OUTPATIENT)
Dept: FAMILY MEDICINE CLINIC | Age: 63
End: 2023-06-19

## 2023-06-19 RX ORDER — NITROFURANTOIN 25; 75 MG/1; MG/1
100 CAPSULE ORAL 2 TIMES DAILY
Qty: 14 CAPSULE | Refills: 0 | Status: SHIPPED | OUTPATIENT
Start: 2023-06-19 | End: 2023-06-26

## 2023-06-19 NOTE — TELEPHONE ENCOUNTER
I sent in macrodantin     Orders Placed This Encounter   Medications    nitrofurantoin, macrocrystal-monohydrate, (MACROBID) 100 MG capsule     Sig: Take 1 capsule by mouth 2 times daily for 7 days     Dispense:  14 capsule     Refill:  0

## 2023-06-19 NOTE — TELEPHONE ENCOUNTER
[address] \"Jacquelyn\"  P [facility] R[facility] Hurt (supporting Darell Friend, MD) Just now (11:02 AM)       Pain when going to bathroom, pain on left side where kidney is, urinate all the time. [facility] on [address]       [address] \"Jacquelyn\" 8 minutes ago (10:54 AM)     SL  What are your symptoms and which pharmacy? [address] \"Jacquelyn\"  [facility] Staff (supporting Darell Friend, MD) 9 minutes ago (10:53 AM)       I took a home test for uti and it came back positive. Could Dr Yasmin Corona call in a scrip for me?

## 2023-06-27 ENCOUNTER — OFFICE VISIT (OUTPATIENT)
Dept: FAMILY MEDICINE CLINIC | Age: 63
End: 2023-06-27
Payer: MEDICARE

## 2023-06-27 VITALS
BODY MASS INDEX: 26.52 KG/M2 | DIASTOLIC BLOOD PRESSURE: 84 MMHG | HEIGHT: 66 IN | WEIGHT: 165 LBS | SYSTOLIC BLOOD PRESSURE: 128 MMHG | TEMPERATURE: 97 F

## 2023-06-27 DIAGNOSIS — R22.31 RIGHT AXILLARY FULLNESS: Primary | ICD-10-CM

## 2023-06-27 PROCEDURE — 4004F PT TOBACCO SCREEN RCVD TLK: CPT | Performed by: FAMILY MEDICINE

## 2023-06-27 PROCEDURE — 99212 OFFICE O/P EST SF 10 MIN: CPT | Performed by: FAMILY MEDICINE

## 2023-06-27 PROCEDURE — G8427 DOCREV CUR MEDS BY ELIG CLIN: HCPCS | Performed by: FAMILY MEDICINE

## 2023-06-27 PROCEDURE — 3074F SYST BP LT 130 MM HG: CPT | Performed by: FAMILY MEDICINE

## 2023-06-27 PROCEDURE — G8419 CALC BMI OUT NRM PARAM NOF/U: HCPCS | Performed by: FAMILY MEDICINE

## 2023-06-27 PROCEDURE — 3079F DIAST BP 80-89 MM HG: CPT | Performed by: FAMILY MEDICINE

## 2023-06-27 PROCEDURE — 3017F COLORECTAL CA SCREEN DOC REV: CPT | Performed by: FAMILY MEDICINE

## 2023-06-29 ENCOUNTER — INITIAL CONSULT (OUTPATIENT)
Dept: SURGERY | Age: 63
End: 2023-06-29
Payer: MEDICARE

## 2023-06-29 VITALS
HEIGHT: 66 IN | BODY MASS INDEX: 26.52 KG/M2 | SYSTOLIC BLOOD PRESSURE: 147 MMHG | DIASTOLIC BLOOD PRESSURE: 70 MMHG | HEART RATE: 91 BPM | WEIGHT: 165 LBS

## 2023-06-29 DIAGNOSIS — D17.21 LIPOMA OF RIGHT AXILLA: Primary | ICD-10-CM

## 2023-06-29 PROCEDURE — 3017F COLORECTAL CA SCREEN DOC REV: CPT | Performed by: SURGERY

## 2023-06-29 PROCEDURE — 4004F PT TOBACCO SCREEN RCVD TLK: CPT | Performed by: SURGERY

## 2023-06-29 PROCEDURE — 99203 OFFICE O/P NEW LOW 30 MIN: CPT | Performed by: SURGERY

## 2023-06-29 PROCEDURE — G8419 CALC BMI OUT NRM PARAM NOF/U: HCPCS | Performed by: SURGERY

## 2023-06-29 PROCEDURE — 3074F SYST BP LT 130 MM HG: CPT | Performed by: SURGERY

## 2023-06-29 PROCEDURE — G8427 DOCREV CUR MEDS BY ELIG CLIN: HCPCS | Performed by: SURGERY

## 2023-06-29 PROCEDURE — 3078F DIAST BP <80 MM HG: CPT | Performed by: SURGERY

## 2023-07-03 RX ORDER — CARVEDILOL 25 MG/1
TABLET ORAL
Qty: 180 TABLET | Refills: 3 | Status: SHIPPED | OUTPATIENT
Start: 2023-07-03

## 2023-07-03 RX ORDER — SULINDAC 200 MG/1
TABLET ORAL
Qty: 180 TABLET | Refills: 1 | Status: SHIPPED | OUTPATIENT
Start: 2023-07-03

## 2023-07-03 NOTE — TELEPHONE ENCOUNTER
Last OV: 4/19/23  Next OV: 10/25/233  Last refill: 6/15/22  Most recent Labs: CMP 6/2/23  Last EKG (if needed):

## 2023-07-17 ENCOUNTER — HOSPITAL ENCOUNTER (OUTPATIENT)
Dept: WOMENS IMAGING | Age: 63
Discharge: HOME | End: 2023-07-17
Payer: MEDICARE

## 2023-07-17 ENCOUNTER — TELEPHONE (OUTPATIENT)
Dept: FAMILY MEDICINE CLINIC | Age: 63
End: 2023-07-17

## 2023-07-17 ENCOUNTER — HOSPITAL ENCOUNTER (OUTPATIENT)
Dept: CT IMAGING | Age: 63
Discharge: HOME | End: 2023-07-17
Payer: MEDICARE

## 2023-07-17 ENCOUNTER — TELEPHONE (OUTPATIENT)
Dept: WOMENS IMAGING | Age: 63
End: 2023-07-17

## 2023-07-17 DIAGNOSIS — Z12.31 SCREENING MAMMOGRAM FOR BREAST CANCER: ICD-10-CM

## 2023-07-17 DIAGNOSIS — R22.31 MASS OF RIGHT AXILLA: Primary | ICD-10-CM

## 2023-07-17 DIAGNOSIS — Z87.891 PERSONAL HISTORY OF TOBACCO USE: ICD-10-CM

## 2023-07-17 PROCEDURE — 71271 CT THORAX LUNG CANCER SCR C-: CPT

## 2023-07-17 NOTE — TELEPHONE ENCOUNTER
Patient is at [facility] for bilateral screening mammogram, they cancelled that and need new order for bilateral diagnostic mammogram and bilateral us. This is because of lump right axilla. They will reschedule patient when they get new orders.       Please advise, [phone]

## 2023-07-17 NOTE — TELEPHONE ENCOUNTER
Ok done     Diagnosis Orders   1.  Mass of right axilla  OSCAR DIRK DIGITAL DIAGNOSTIC BILATERAL    US BREAST LIMITED RIGHT

## 2023-08-03 ENCOUNTER — OFFICE VISIT (OUTPATIENT)
Dept: FAMILY MEDICINE CLINIC | Age: 63
End: 2023-08-03

## 2023-08-03 VITALS
WEIGHT: 164.8 LBS | DIASTOLIC BLOOD PRESSURE: 80 MMHG | BODY MASS INDEX: 26.48 KG/M2 | TEMPERATURE: 97.7 F | SYSTOLIC BLOOD PRESSURE: 118 MMHG | HEIGHT: 66 IN

## 2023-08-03 DIAGNOSIS — R74.8 ALKALINE PHOSPHATASE ELEVATION: ICD-10-CM

## 2023-08-03 DIAGNOSIS — R10.9 LEFT SIDED ABDOMINAL PAIN: ICD-10-CM

## 2023-08-03 DIAGNOSIS — R23.2 ABNORMAL FLUSHING AND SWEATING: ICD-10-CM

## 2023-08-03 DIAGNOSIS — R61 ABNORMAL FLUSHING AND SWEATING: ICD-10-CM

## 2023-08-03 DIAGNOSIS — R23.2 ABNORMAL FLUSHING AND SWEATING: Primary | ICD-10-CM

## 2023-08-03 DIAGNOSIS — R61 ABNORMAL FLUSHING AND SWEATING: Primary | ICD-10-CM

## 2023-08-03 LAB
ALBUMIN SERPL-MCNC: 4.6 G/DL (ref 3.4–5)
ALBUMIN/GLOB SERPL: 1.9 {RATIO} (ref 1.1–2.2)
ALP SERPL-CCNC: 148 U/L (ref 40–129)
ALT SERPL-CCNC: 23 U/L (ref 10–40)
ANION GAP SERPL CALCULATED.3IONS-SCNC: 13 MMOL/L (ref 3–16)
AST SERPL-CCNC: 23 U/L (ref 15–37)
BACTERIA URNS QL MICRO: ABNORMAL /HPF
BASOPHILS # BLD: 0 K/UL (ref 0–0.2)
BASOPHILS NFR BLD: 0.6 %
BILIRUB SERPL-MCNC: <0.2 MG/DL (ref 0–1)
BILIRUB UR QL STRIP.AUTO: NEGATIVE
BUN SERPL-MCNC: 13 MG/DL (ref 7–20)
CALCIUM SERPL-MCNC: 9.4 MG/DL (ref 8.3–10.6)
CHLORIDE SERPL-SCNC: 103 MMOL/L (ref 99–110)
CLARITY UR: ABNORMAL
CO2 SERPL-SCNC: 22 MMOL/L (ref 21–32)
COLOR UR: ABNORMAL
CREAT SERPL-MCNC: 0.8 MG/DL (ref 0.6–1.2)
DEPRECATED RDW RBC AUTO: 14.5 % (ref 12.4–15.4)
EOSINOPHIL # BLD: 0.1 K/UL (ref 0–0.6)
EOSINOPHIL NFR BLD: 1.5 %
EPI CELLS #/AREA URNS AUTO: 7 /HPF (ref 0–5)
GFR SERPLBLD CREATININE-BSD FMLA CKD-EPI: >60 ML/MIN/{1.73_M2}
GGT SERPL-CCNC: 44 U/L (ref 5–36)
GLUCOSE SERPL-MCNC: 127 MG/DL (ref 70–99)
GLUCOSE UR STRIP.AUTO-MCNC: NEGATIVE MG/DL
HCT VFR BLD AUTO: 42.2 % (ref 36–48)
HGB BLD-MCNC: 14.5 G/DL (ref 12–16)
HGB UR QL STRIP.AUTO: ABNORMAL
HYALINE CASTS #/AREA URNS AUTO: 0 /LPF (ref 0–8)
KETONES UR STRIP.AUTO-MCNC: NEGATIVE MG/DL
LEUKOCYTE ESTERASE UR QL STRIP.AUTO: NEGATIVE
LYMPHOCYTES # BLD: 3 K/UL (ref 1–5.1)
LYMPHOCYTES NFR BLD: 39.4 %
MCH RBC QN AUTO: 32.2 PG (ref 26–34)
MCHC RBC AUTO-ENTMCNC: 34.5 G/DL (ref 31–36)
MCV RBC AUTO: 93.5 FL (ref 80–100)
MONOCYTES # BLD: 0.4 K/UL (ref 0–1.3)
MONOCYTES NFR BLD: 4.6 %
NEUTROPHILS # BLD: 4.1 K/UL (ref 1.7–7.7)
NEUTROPHILS NFR BLD: 53.9 %
NITRITE UR QL STRIP.AUTO: NEGATIVE
PH UR STRIP.AUTO: 6.5 [PH] (ref 5–8)
PLATELET # BLD AUTO: 212 K/UL (ref 135–450)
PMV BLD AUTO: 7.8 FL (ref 5–10.5)
POTASSIUM SERPL-SCNC: 4.6 MMOL/L (ref 3.5–5.1)
PROT SERPL-MCNC: 7 G/DL (ref 6.4–8.2)
PROT UR STRIP.AUTO-MCNC: NEGATIVE MG/DL
RBC # BLD AUTO: 4.51 M/UL (ref 4–5.2)
RBC CLUMPS #/AREA URNS AUTO: 17 /HPF (ref 0–4)
SODIUM SERPL-SCNC: 138 MMOL/L (ref 136–145)
SP GR UR STRIP.AUTO: 1.02 (ref 1–1.03)
TSH SERPL DL<=0.005 MIU/L-ACNC: 2.26 UIU/ML (ref 0.27–4.2)
UA DIPSTICK W REFLEX MICRO PNL UR: YES
URN SPEC COLLECT METH UR: ABNORMAL
UROBILINOGEN UR STRIP-ACNC: 0.2 E.U./DL
WBC # BLD AUTO: 7.7 K/UL (ref 4–11)
WBC #/AREA URNS AUTO: 2 /HPF (ref 0–5)

## 2023-08-03 RX ORDER — FLUTICASONE PROPIONATE 50 MCG
SPRAY, SUSPENSION (ML) NASAL
COMMUNITY
Start: 2022-02-07

## 2023-08-03 RX ORDER — TRIAMCINOLONE ACETONIDE 1 MG/G
CREAM TOPICAL
COMMUNITY
Start: 2023-07-24

## 2023-08-03 RX ORDER — CETIRIZINE HYDROCHLORIDE 10 MG/1
10 TABLET ORAL DAILY
COMMUNITY

## 2023-08-03 NOTE — PATIENT INSTRUCTIONS
Do the additional tests  Stop the metformin on the day of the ct scan  Call me the next day for the  ok to restart  Do take fluids especially after the ct scan   See me back in about 2-3 weeks

## 2023-08-05 LAB
ALP BONE SERPL-CCNC: 59 U/L (ref 0–55)
ALP ISOS SERPL HS-CCNC: 0 U/L
ALP LIVER SERPL-CCNC: 88 U/L (ref 0–94)
ALP SERPL-CCNC: 147 U/L (ref 40–120)

## 2023-08-07 ENCOUNTER — TELEPHONE (OUTPATIENT)
Dept: FAMILY MEDICINE CLINIC | Age: 63
End: 2023-08-07

## 2023-08-07 RX ORDER — NITROFURANTOIN 25; 75 MG/1; MG/1
100 CAPSULE ORAL 2 TIMES DAILY
Qty: 14 CAPSULE | Refills: 0 | Status: SHIPPED | OUTPATIENT
Start: 2023-08-07 | End: 2023-08-14

## 2023-08-07 NOTE — TELEPHONE ENCOUNTER
----- Message from Cherylene Model. Puckett sent at 8/7/2023 12:21 PM EDT -----  Regarding: Right side pain  Contact: [phone]  My right side pain is getting worse,  not kidney stone pain, and home uti test is positive for uti

## 2023-08-07 NOTE — TELEPHONE ENCOUNTER
Med sent    Orders Placed This Encounter   Medications    nitrofurantoin, macrocrystal-monohydrate, (MACROBID) 100 MG capsule     Sig: Take 1 capsule by mouth 2 times daily for 7 days     Dispense:  14 capsule     Refill:  0

## 2023-08-09 ENCOUNTER — TELEPHONE (OUTPATIENT)
Dept: FAMILY MEDICINE CLINIC | Age: 63
End: 2023-08-09

## 2023-08-09 DIAGNOSIS — M06.9 RHEUMATOID ARTHRITIS, INVOLVING UNSPECIFIED SITE, UNSPECIFIED WHETHER RHEUMATOID FACTOR PRESENT (HCC): Primary | ICD-10-CM

## 2023-08-09 NOTE — TELEPHONE ENCOUNTER
----- Message from Judie Sears sent at 8/9/2023  1:21 PM EDT -----  Regarding: Cat Scan  Contact: [phone]  [facility] Dr Dary [facility]yas [facility], he wants a CAT SCAN, so I am having one done tomorrow at their surgical center,  I will try to get them to send the results to you also.

## 2023-08-09 NOTE — TELEPHONE ENCOUNTER
----- Message from Jake Smith sent at 5/0/9155  1:40 PM EDT -----  Subject: Referral Request    Reason for referral request? Rheumatologist   Provider patient wants to be referred to(if known):     Provider Phone Number(if known):     Additional Information for Provider?   ---------------------------------------------------------------------------  --------------  [address]    [phone]; OK to leave message on voicemail  ---------------------------------------------------------------------------  --------------

## 2023-08-11 ENCOUNTER — TELEPHONE (OUTPATIENT)
Dept: FAMILY MEDICINE CLINIC | Age: 63
End: 2023-08-11

## 2023-08-11 NOTE — TELEPHONE ENCOUNTER
Jacquelyn states when she called Dr. Ajith Cartagena to schedule appt they told her to send referral along with office notes and labs before they schedule. Faxed info to [phone].

## 2023-08-14 ENCOUNTER — HOSPITAL ENCOUNTER (OUTPATIENT)
Age: 63
Discharge: HOME | End: 2023-08-14
Payer: MEDICARE

## 2023-08-14 ENCOUNTER — HOSPITAL ENCOUNTER (OUTPATIENT)
Dept: ULTRASOUND IMAGING | Age: 63
Discharge: HOME | End: 2023-08-14
Payer: MEDICARE

## 2023-08-14 ENCOUNTER — HOSPITAL ENCOUNTER (OUTPATIENT)
Dept: GENERAL RADIOLOGY | Age: 63
Discharge: HOME | End: 2023-08-14
Payer: MEDICARE

## 2023-08-14 ENCOUNTER — HOSPITAL ENCOUNTER (OUTPATIENT)
Dept: WOMENS IMAGING | Age: 63
Discharge: HOME | End: 2023-08-14
Payer: MEDICARE

## 2023-08-14 VITALS — WEIGHT: 162 LBS | BODY MASS INDEX: 25.43 KG/M2 | HEIGHT: 67 IN

## 2023-08-14 DIAGNOSIS — R22.31 MASS OF RIGHT AXILLA: ICD-10-CM

## 2023-08-14 DIAGNOSIS — Z87.442 PERSONAL HISTORY OF URINARY CALCULI: ICD-10-CM

## 2023-08-14 PROCEDURE — 74018 RADEX ABDOMEN 1 VIEW: CPT

## 2023-08-14 PROCEDURE — G0279 TOMOSYNTHESIS, MAMMO: HCPCS

## 2023-08-14 PROCEDURE — 76642 ULTRASOUND BREAST LIMITED: CPT

## 2023-09-21 RX ORDER — LOSARTAN POTASSIUM 100 MG/1
TABLET ORAL
Qty: 90 TABLET | Refills: 3 | Status: SHIPPED | OUTPATIENT
Start: 2023-09-21

## 2023-09-21 NOTE — TELEPHONE ENCOUNTER
Last OV: 4/19/23  Next OV: 10/25/23  Last refill: 8/15/22  #90  3 R/F  Most recent Labs: 8/3/23, 8/9/23  Last EKG (if needed):  3/6/23

## 2023-09-22 ENCOUNTER — PATIENT MESSAGE (OUTPATIENT)
Dept: CARDIOLOGY CLINIC | Age: 63
End: 2023-09-22

## 2023-09-26 ENCOUNTER — TELEPHONE (OUTPATIENT)
Dept: CARDIOLOGY CLINIC | Age: 63
End: 2023-09-26

## 2023-09-26 NOTE — TELEPHONE ENCOUNTER
Dr. Hany Boogie,    Patient stopped Jardiance due to UTI and kidney problems. She also reported the medication was cost prohibitive. Please advise of any further recommendations.     Thanks,  Justyn Platt RN

## 2023-09-26 NOTE — TELEPHONE ENCOUNTER
This message was sent by [facility]    September 22, 2023  Cruzito Degroot \"Jacquelyn\"  to [facility] (supporting Daniel Vanegas MD)        9/22/23 11:44 AM  I stopped taken this because I was having uti's and kidney problems and it is too expensive. What if any thing  should I  be doing ? Or just do what I was doing before?     She also called back today wanting to know what to do or if there is something else that can be recommended to take instead of the  Liza Gageer can be reached at [phone]

## 2023-09-27 ENCOUNTER — OFFICE VISIT (OUTPATIENT)
Dept: PULMONOLOGY | Age: 63
End: 2023-09-27
Payer: MEDICARE

## 2023-09-27 VITALS
DIASTOLIC BLOOD PRESSURE: 80 MMHG | WEIGHT: 163.4 LBS | SYSTOLIC BLOOD PRESSURE: 130 MMHG | RESPIRATION RATE: 16 BRPM | BODY MASS INDEX: 25.65 KG/M2 | TEMPERATURE: 97 F | HEART RATE: 77 BPM | HEIGHT: 67 IN | OXYGEN SATURATION: 98 %

## 2023-09-27 DIAGNOSIS — J44.9 CHRONIC OBSTRUCTIVE PULMONARY DISEASE, UNSPECIFIED COPD TYPE (HCC): Primary | ICD-10-CM

## 2023-09-27 DIAGNOSIS — Z87.891 PERSONAL HISTORY OF TOBACCO USE: ICD-10-CM

## 2023-09-27 PROCEDURE — G8427 DOCREV CUR MEDS BY ELIG CLIN: HCPCS | Performed by: INTERNAL MEDICINE

## 2023-09-27 PROCEDURE — 3075F SYST BP GE 130 - 139MM HG: CPT | Performed by: INTERNAL MEDICINE

## 2023-09-27 PROCEDURE — G8419 CALC BMI OUT NRM PARAM NOF/U: HCPCS | Performed by: INTERNAL MEDICINE

## 2023-09-27 PROCEDURE — 99214 OFFICE O/P EST MOD 30 MIN: CPT | Performed by: INTERNAL MEDICINE

## 2023-09-27 PROCEDURE — 3017F COLORECTAL CA SCREEN DOC REV: CPT | Performed by: INTERNAL MEDICINE

## 2023-09-27 PROCEDURE — 4004F PT TOBACCO SCREEN RCVD TLK: CPT | Performed by: INTERNAL MEDICINE

## 2023-09-27 PROCEDURE — 3079F DIAST BP 80-89 MM HG: CPT | Performed by: INTERNAL MEDICINE

## 2023-09-27 PROCEDURE — 3023F SPIROM DOC REV: CPT | Performed by: INTERNAL MEDICINE

## 2023-09-27 NOTE — ASSESSMENT & PLAN NOTE
PFTs with moderate COPD, FEV1 71%  -Suboptimal control on Stiolto  -We will escalate to Trelegy 200.   Provided a sample she will let us know if she has symptomatic relief at the end of the sample if so she can call and we can send in a prescription for her.  -Singulair

## 2023-09-27 NOTE — PROGRESS NOTES
rate. Regular rhythm. No murmur or rub. No edema. GI: Soft, Non-tender. Non-distended. +BS  Skin: Warm, dry, w/o erythema. Lymph: No cervical or supraclavicular LAD. M/S: No cyanosis. No clubbing. Neuro:  no focal neurologic deficit. Moves all extremities  Psych: Awake and alert, Oriented x 3. Judgement and insight appropriate. Mood stable. CT chest images reviewed personally by me, interpretation as follows:  -Mild centrilobular emphysema upper lobe predominant. Stable punctate nodularities. An electronic signature was used to authenticate this note.     --Tremaine Lozano MD

## 2023-09-28 ENCOUNTER — TELEPHONE (OUTPATIENT)
Dept: CARDIOLOGY CLINIC | Age: 63
End: 2023-09-28

## 2023-09-29 ENCOUNTER — OFFICE VISIT (OUTPATIENT)
Dept: FAMILY MEDICINE CLINIC | Age: 63
End: 2023-09-29

## 2023-09-29 ENCOUNTER — OFFICE VISIT (OUTPATIENT)
Dept: CARDIOLOGY CLINIC | Age: 63
End: 2023-09-29
Payer: MEDICARE

## 2023-09-29 VITALS
SYSTOLIC BLOOD PRESSURE: 128 MMHG | HEIGHT: 67 IN | HEART RATE: 72 BPM | WEIGHT: 163.2 LBS | DIASTOLIC BLOOD PRESSURE: 80 MMHG | BODY MASS INDEX: 25.62 KG/M2 | TEMPERATURE: 97 F

## 2023-09-29 VITALS
OXYGEN SATURATION: 96 % | WEIGHT: 163.2 LBS | BODY MASS INDEX: 25.95 KG/M2 | HEART RATE: 63 BPM | DIASTOLIC BLOOD PRESSURE: 80 MMHG | SYSTOLIC BLOOD PRESSURE: 150 MMHG

## 2023-09-29 DIAGNOSIS — I50.32 CHRONIC DIASTOLIC HEART FAILURE (HCC): Primary | ICD-10-CM

## 2023-09-29 DIAGNOSIS — R06.09 DOE (DYSPNEA ON EXERTION): ICD-10-CM

## 2023-09-29 DIAGNOSIS — E78.2 MIXED HYPERLIPIDEMIA: ICD-10-CM

## 2023-09-29 DIAGNOSIS — I35.1 NON-RHEUMATIC AORTIC REGURGITATION: ICD-10-CM

## 2023-09-29 DIAGNOSIS — I25.10 CORONARY ARTERY CALCIFICATION: ICD-10-CM

## 2023-09-29 DIAGNOSIS — I25.84 CORONARY ARTERY CALCIFICATION: ICD-10-CM

## 2023-09-29 DIAGNOSIS — E11.9 TYPE 2 DIABETES MELLITUS WITHOUT COMPLICATION, WITHOUT LONG-TERM CURRENT USE OF INSULIN (HCC): Primary | ICD-10-CM

## 2023-09-29 DIAGNOSIS — R07.89 ATYPICAL CHEST PAIN: ICD-10-CM

## 2023-09-29 DIAGNOSIS — I10 ESSENTIAL HYPERTENSION: ICD-10-CM

## 2023-09-29 PROCEDURE — G8419 CALC BMI OUT NRM PARAM NOF/U: HCPCS | Performed by: INTERNAL MEDICINE

## 2023-09-29 PROCEDURE — 93000 ELECTROCARDIOGRAM COMPLETE: CPT | Performed by: INTERNAL MEDICINE

## 2023-09-29 PROCEDURE — 4004F PT TOBACCO SCREEN RCVD TLK: CPT | Performed by: INTERNAL MEDICINE

## 2023-09-29 PROCEDURE — G8427 DOCREV CUR MEDS BY ELIG CLIN: HCPCS | Performed by: INTERNAL MEDICINE

## 2023-09-29 PROCEDURE — 3017F COLORECTAL CA SCREEN DOC REV: CPT | Performed by: INTERNAL MEDICINE

## 2023-09-29 PROCEDURE — 3074F SYST BP LT 130 MM HG: CPT | Performed by: INTERNAL MEDICINE

## 2023-09-29 PROCEDURE — 3078F DIAST BP <80 MM HG: CPT | Performed by: INTERNAL MEDICINE

## 2023-09-29 PROCEDURE — 99214 OFFICE O/P EST MOD 30 MIN: CPT | Performed by: INTERNAL MEDICINE

## 2023-09-29 RX ORDER — FLUTICASONE FUROATE, UMECLIDINIUM BROMIDE AND VILANTEROL TRIFENATATE 200; 62.5; 25 UG/1; UG/1; UG/1
1 POWDER RESPIRATORY (INHALATION) DAILY
COMMUNITY

## 2023-09-29 RX ORDER — METFORMIN HYDROCHLORIDE 500 MG/1
1000 TABLET, EXTENDED RELEASE ORAL 2 TIMES DAILY WITH MEALS
Qty: 270 TABLET | Refills: 1
Start: 2023-09-29

## 2023-09-29 NOTE — PROGRESS NOTES
[facility]      Cardiology Progress Note    Tunde Magaña  1960 September 29, 2023    Referring Physician: Dr. Christine Kocher  Reason for Referral: cardiomyopathy    CC: \"I could be better\"     HPI:  The patient is 57 y.o. female with a past medical history significant for cardiomyopathy, nicotine addiction/COPD, and Type II DM presents for chronic management of a cardiomyopathy and aortic regurgitation. She had an echocardiogram ordered 1/2017 for a newly heard murmur. The echo report stated an EF of 40% with moderate aortic regurgitation. Her repeat echocardiogram 2/13/18 showed a recovered EF at 60% but her echo 4/2021 showed a mildly reduced EF at 45-50%. Her repeat echocardiogram 4/2022 showed the LVEF maybe slightly improved at 50-55% with moderate to severe aortic regurgitation. She underwent a cardiac MRI which showed the AI was much less significant. PFTs confirmed COPD and she was referred to pulmonary. She notes improvement in BUSTAMANTE with inhalers but she continues to smoke. No SGLT2i due to UTI and cost prohibitive. She saw pulmonology 9/27/23 for worsening shortness of breath and chest pain. She had a change in her inhalers but this was only 2 days ago. Today, presents for an early follow up due to worsening shortness of breath and chest pain. She states the shortness of breath typically occurs with exertion. She describes the chest pain as an \"elephant is sitting\" on her chest. The episodes do not typically occur with exertion. She reports medication compliance and is tolerating. She denies any abnormal bleeding or bruising. She denies exertional chest pain PND, orthopnea, palpitations, lightheadedness, weight changes, changes in LE edema, and syncope.     Past Medical History:   Diagnosis Date    Anxiety     Arrhythmia     Asthma     CAD (coronary artery disease)     Chronic back pain     Chronic obstructive pulmonary disease ([address]) 07/06/2022    COPD (chronic obstructive Griseofulvin Counseling:  I discussed with the patient the risks of griseofulvin including but not limited to photosensitivity, cytopenia, liver damage, nausea/vomiting and severe allergy.  The patient understands that this medication is best absorbed when taken with a fatty meal (e.g., ice cream or french fries).

## 2023-09-29 NOTE — PROGRESS NOTES
(Alexandria Shy) 200-62.5-25 MCG/ACT AEPB inhaler, Inhale 1 puff into the lungs daily, Disp: , Rfl:   losartan (COZAAR) 100 MG tablet, TAKE 1 TABLET EVERY DAY, Disp: 90 tablet, Rfl: 3  triamcinolone (KENALOG) 0.1 % cream, , Disp: , Rfl:   fluticasone (FLONASE) 50 MCG/ACT nasal spray, , Disp: , Rfl:   cetirizine (ZYRTEC) 10 MG tablet, Take 1 tablet by mouth daily, Disp: , Rfl:   carvedilol (COREG) 25 MG tablet, TAKE 1 TABLET TWICE DAILY, Disp: 180 tablet, Rfl: 3  sulindac (CLINORIL) 200 MG tablet, TAKE 1 TABLET TWICE DAILY, Disp: 180 tablet, Rfl: 1  montelukast (SINGULAIR) 10 MG tablet, TAKE 1 TABLET EVERY DAY, Disp: 90 tablet, Rfl: 1  metFORMIN (GLUCOPHAGE-XR) 500 MG extended release tablet, Take 1 tablet by mouth 3 times daily, Disp: 270 tablet, Rfl: 1  atorvastatin (LIPITOR) 20 MG tablet, TAKE 1 TABLET EVERY DAY, Disp: 90 tablet, Rfl: 1  cyclobenzaprine (FLEXERIL) 10 MG tablet, TAKE 1 TABLET TWICE DAILY AS NEEDED FOR MUSCLE SPASMS, Disp: 180 tablet, Rfl: 1  albuterol (PROVENTIL) (2.5 MG/3ML) 0.083% nebulizer solution, Use one vial every 6 hours in nebulizer as needed, Disp: 120 each, Rfl: 0  ACCU-CHEK SMARTVIEW strip, TEST  ONE  TIME  DAILY, Disp: 100 strip, Rfl: 3  albuterol sulfate  (90 Base) MCG/ACT inhaler, INHALE 2 PUFFS INTO THE LUNGS EVERY 6 HOURS AS NEEDED FOR WHEEZING OR SHORTNESS OF BREATH OR COUGH, Disp: 54 g, Rfl: 1  Blood Glucose Monitoring Suppl JOSSELYN, 1 each by Does not apply route daily, Disp: 1 Device, Rfl: 0  traZODone (DESYREL) 50 MG tablet, Take 2 tablets by mouth nightly Take 2 tablets nightly, Disp: , Rfl: 2  ALPRAZolam (XANAX PO), Take 0.5 mg by mouth in the morning and at bedtime, Disp: , Rfl:   venlafaxine (EFFEXOR XR) 150 MG extended release capsule, TAKE 1 CAPSULE EVERY DAY, Disp: 90 capsule, Rfl: 1  ACCU-CHEK FASTCLIX LANCETS MISC, TEST ONE TIME DAILY, Disp: 102 each, Rfl: 3   tiotropium-olodaterol (STIOLTO RESPIMAT) 2.5-2.5 MCG/ACT AERS, Inhale 2 puffs into the lungs daily

## 2023-09-29 NOTE — TELEPHONE ENCOUNTER
From: Handy Crawford  To: Dr. Chago Mcgrath: 9/22/2023 11:44 AM EDT  Subject: Padmaja Million    I stopped taken this because I was having uti's and kidney problems and it is too expensive. What if any thing should I be doing ? Or just do what I was doing before?

## 2023-09-29 NOTE — PATIENT INSTRUCTIONS
Do see the cardiologist today  Call for any problem  See in 3 months   Do talk with the rheumatologist about the alkaline phosphatase

## 2023-10-02 ENCOUNTER — TELEPHONE (OUTPATIENT)
Dept: PULMONOLOGY | Age: 63
End: 2023-10-02

## 2023-10-02 DIAGNOSIS — J44.9 CHRONIC OBSTRUCTIVE PULMONARY DISEASE, UNSPECIFIED COPD TYPE (HCC): Primary | ICD-10-CM

## 2023-10-02 RX ORDER — FLUTICASONE FUROATE, UMECLIDINIUM BROMIDE AND VILANTEROL TRIFENATATE 200; 62.5; 25 UG/1; UG/1; UG/1
1 POWDER RESPIRATORY (INHALATION) DAILY
Qty: 3 EACH | Refills: 3 | Status: SHIPPED | OUTPATIENT
Start: 2023-10-02

## 2023-10-02 NOTE — TELEPHONE ENCOUNTER
Pt called because she is noticing an improvement with the sample of Trelegy. Pt requested a prescription be sent to the [facility] mail delivery [facility].

## 2023-10-04 NOTE — PATIENT INSTRUCTIONS
Do get the ok from dr Tapia Shall for the surgery  No sulindac no vitamins or supplements for a week before the surgery  On the morning of the surgery take the pantoprazole, carvedilol, and effexor with just enough water to get the pills down as soon as you get out of bed   No metformin the night before the surgery  Take the cpap to hospital with you
bed rails

## 2023-10-09 ENCOUNTER — TELEPHONE (OUTPATIENT)
Dept: CARDIOLOGY CLINIC | Age: 63
End: 2023-10-09

## 2023-10-09 NOTE — TELEPHONE ENCOUNTER
Jacquelyn called int his morning stating that her Rheumatologist Dr. Blanca Fajardo wants to start her on Diclofenac Sodium Tropical Gel and Plaquenil.  Jacquelyn is wanting to know if Dr. John Sibley is okay her starting the 2 medications, she read that if you have heart issues then to speak with your cardiologist.     Merlin August can reach Jacquelyn at #[phone]

## 2023-10-10 RX ORDER — METFORMIN HYDROCHLORIDE 500 MG/1
1000 TABLET, EXTENDED RELEASE ORAL 2 TIMES DAILY WITH MEALS
Qty: 360 TABLET | Refills: 1 | Status: SHIPPED | OUTPATIENT
Start: 2023-10-10

## 2023-10-12 DIAGNOSIS — E78.2 MIXED HYPERLIPIDEMIA: ICD-10-CM

## 2023-10-12 DIAGNOSIS — E11.9 TYPE 2 DIABETES MELLITUS WITHOUT COMPLICATION, WITHOUT LONG-TERM CURRENT USE OF INSULIN (HCC): ICD-10-CM

## 2023-10-12 LAB
CHOLEST SERPL-MCNC: 166 MG/DL (ref 0–199)
HDLC SERPL-MCNC: 58 MG/DL (ref 40–60)
LDLC SERPL CALC-MCNC: 73 MG/DL
TRIGL SERPL-MCNC: 175 MG/DL (ref 0–150)
VLDLC SERPL CALC-MCNC: 35 MG/DL

## 2023-10-13 LAB
EST. AVERAGE GLUCOSE BLD GHB EST-MCNC: 139.9 MG/DL
HBA1C MFR BLD: 6.5 %

## 2023-10-19 ENCOUNTER — TELEPHONE (OUTPATIENT)
Dept: FAMILY MEDICINE CLINIC | Age: 63
End: 2023-10-19

## 2023-10-19 NOTE — TELEPHONE ENCOUNTER
Should be ok  She may want to use one of the weekly/daily pill organizers to help keep this straight

## 2023-10-19 NOTE — TELEPHONE ENCOUNTER
Patient took a double dose accidentally last night of the following medications:  Lipitor  Cozaar  Caravedilol  Metformin  Alprazolam  Sulindac  Her blood pressure this morning is 125/76   Hr 74  Sugar 152. She is not feeling sleepy or no headaches or dizziness. She did call poison control last night and was advised to call pcp this morning. Please advise.

## 2023-10-26 ENCOUNTER — HOSPITAL ENCOUNTER (OUTPATIENT)
Dept: NON INVASIVE DIAGNOSTICS | Age: 63
Discharge: HOME | End: 2023-10-26
Payer: MEDICARE

## 2023-10-26 PROCEDURE — 3430000000 HC RX DIAGNOSTIC RADIOPHARMACEUTICAL: Performed by: INTERNAL MEDICINE

## 2023-10-26 PROCEDURE — 93306 TTE W/DOPPLER COMPLETE: CPT

## 2023-10-26 PROCEDURE — A9502 TC99M TETROFOSMIN: HCPCS | Performed by: INTERNAL MEDICINE

## 2023-10-26 PROCEDURE — 93017 CV STRESS TEST TRACING ONLY: CPT | Performed by: INTERNAL MEDICINE

## 2023-10-26 PROCEDURE — 6360000002 HC RX W HCPCS: Performed by: INTERNAL MEDICINE

## 2023-10-26 PROCEDURE — 78452 HT MUSCLE IMAGE SPECT MULT: CPT | Performed by: INTERNAL MEDICINE

## 2023-10-26 RX ORDER — REGADENOSON 0.08 MG/ML
0.4 INJECTION, SOLUTION INTRAVENOUS
Status: COMPLETED | OUTPATIENT
Start: 2023-10-26 | End: 2023-10-26

## 2023-10-26 RX ADMIN — REGADENOSON 0.4 MG: 0.08 INJECTION, SOLUTION INTRAVENOUS at 13:21

## 2023-10-26 RX ADMIN — TETROFOSMIN 10 MILLICURIE: 1.38 INJECTION, POWDER, LYOPHILIZED, FOR SOLUTION INTRAVENOUS at 12:06

## 2023-10-26 RX ADMIN — TETROFOSMIN 30 MILLICURIE: 1.38 INJECTION, POWDER, LYOPHILIZED, FOR SOLUTION INTRAVENOUS at 13:32

## 2023-10-26 NOTE — PROGRESS NOTES
Instructed on Lexiscan Stress Test Procedure including possible side effects/ adverse reactions. Patient verbalizes  understanding and denies having any questions . See [id number] [id number] Cardiology

## 2023-11-28 LAB
DEPRECATED RDW RBC AUTO: 14 % (ref 12.4–15.4)
HCT VFR BLD AUTO: 41.1 % (ref 36–48)
HGB BLD-MCNC: 14.4 G/DL (ref 12–16)
INR PPP: 1.02 (ref 0.84–1.16)
MCH RBC QN AUTO: 33.5 PG (ref 26–34)
MCHC RBC AUTO-ENTMCNC: 35 G/DL (ref 31–36)
MCV RBC AUTO: 95.5 FL (ref 80–100)
PLATELET # BLD AUTO: 172 K/UL (ref 135–450)
PMV BLD AUTO: 8.4 FL (ref 5–10.5)
PROTHROMBIN TIME: 13.4 SEC (ref 11.5–14.8)
RBC # BLD AUTO: 4.3 M/UL (ref 4–5.2)
WBC # BLD AUTO: 7 K/UL (ref 4–11)

## 2023-11-29 RX ORDER — MONTELUKAST SODIUM 10 MG/1
TABLET ORAL
Qty: 90 TABLET | Refills: 0 | Status: SHIPPED | OUTPATIENT
Start: 2023-11-29

## 2023-11-29 RX ORDER — ATORVASTATIN CALCIUM 20 MG/1
TABLET, FILM COATED ORAL
Qty: 90 TABLET | Refills: 3 | Status: SHIPPED | OUTPATIENT
Start: 2023-11-29

## 2023-11-29 RX ORDER — CYCLOBENZAPRINE HCL 10 MG
TABLET ORAL
Qty: 180 TABLET | Refills: 3 | Status: SHIPPED | OUTPATIENT
Start: 2023-11-29

## 2023-12-01 LAB — MRSA SPEC QL CULT: NORMAL

## 2023-12-14 ENCOUNTER — TELEPHONE (OUTPATIENT)
Dept: PULMONOLOGY | Age: 63
End: 2023-12-14

## 2023-12-14 ENCOUNTER — TELEPHONE (OUTPATIENT)
Dept: FAMILY MEDICINE CLINIC | Age: 63
End: 2023-12-14

## 2023-12-14 NOTE — TELEPHONE ENCOUNTER
Pt needs surgery clearance for an outpatient procedure on 12/19/23 with Dr. Morales. She is having a spinal cord stimulator done. She would like the clearance faxed to [phone]. (I did inform the patient that she may need to be seen)

## 2023-12-14 NOTE — TELEPHONE ENCOUNTER
Patient called and needs a pre op appt today or tomorrow for surgery on December 19th for a spinal stimular by Dr Zhane Boo. Please advise about tomorrow.

## 2023-12-15 ENCOUNTER — OFFICE VISIT (OUTPATIENT)
Dept: FAMILY MEDICINE CLINIC | Age: 63
End: 2023-12-15

## 2023-12-15 VITALS
WEIGHT: 155.2 LBS | DIASTOLIC BLOOD PRESSURE: 74 MMHG | TEMPERATURE: 97.7 F | SYSTOLIC BLOOD PRESSURE: 136 MMHG | BODY MASS INDEX: 24.36 KG/M2 | HEART RATE: 72 BPM | HEIGHT: 67 IN

## 2023-12-15 DIAGNOSIS — I42.8 NICM (NONISCHEMIC CARDIOMYOPATHY) (HCC): ICD-10-CM

## 2023-12-15 DIAGNOSIS — E78.2 MIXED HYPERLIPIDEMIA: ICD-10-CM

## 2023-12-15 DIAGNOSIS — E11.9 TYPE 2 DIABETES MELLITUS WITHOUT COMPLICATION, WITHOUT LONG-TERM CURRENT USE OF INSULIN (HCC): ICD-10-CM

## 2023-12-15 DIAGNOSIS — G47.33 OBSTRUCTIVE SLEEP APNEA SYNDROME: ICD-10-CM

## 2023-12-15 DIAGNOSIS — G89.29 CHRONIC BILATERAL LOW BACK PAIN WITHOUT SCIATICA: ICD-10-CM

## 2023-12-15 DIAGNOSIS — I10 ESSENTIAL HYPERTENSION: ICD-10-CM

## 2023-12-15 DIAGNOSIS — Z01.818 PREOP EXAMINATION: Primary | ICD-10-CM

## 2023-12-15 DIAGNOSIS — Z01.818 PREOP EXAMINATION: ICD-10-CM

## 2023-12-15 DIAGNOSIS — M54.50 CHRONIC BILATERAL LOW BACK PAIN WITHOUT SCIATICA: ICD-10-CM

## 2023-12-15 LAB
ALBUMIN SERPL-MCNC: 4.4 G/DL (ref 3.4–5)
ALBUMIN/GLOB SERPL: 1.5 {RATIO} (ref 1.1–2.2)
ALP SERPL-CCNC: 114 U/L (ref 40–129)
ALT SERPL-CCNC: 14 U/L (ref 10–40)
ANION GAP SERPL CALCULATED.3IONS-SCNC: 12 MMOL/L (ref 3–16)
AST SERPL-CCNC: 21 U/L (ref 15–37)
BILIRUB SERPL-MCNC: 0.4 MG/DL (ref 0–1)
BUN SERPL-MCNC: 11 MG/DL (ref 7–20)
CALCIUM SERPL-MCNC: 9.1 MG/DL (ref 8.3–10.6)
CHLORIDE SERPL-SCNC: 102 MMOL/L (ref 99–110)
CO2 SERPL-SCNC: 24 MMOL/L (ref 21–32)
CREAT SERPL-MCNC: 0.7 MG/DL (ref 0.6–1.2)
GFR SERPLBLD CREATININE-BSD FMLA CKD-EPI: >60 ML/MIN/{1.73_M2}
GLUCOSE SERPL-MCNC: 60 MG/DL (ref 70–99)
POTASSIUM SERPL-SCNC: 4.3 MMOL/L (ref 3.5–5.1)
PROT SERPL-MCNC: 7.4 G/DL (ref 6.4–8.2)
SODIUM SERPL-SCNC: 138 MMOL/L (ref 136–145)

## 2023-12-15 NOTE — PATIENT INSTRUCTIONS
No metformin the night before the surgery  Stop the sulindac now and you can restart after surgery   On the morning of the surgery take the carvedilol with just enough water to get the pill down as soon as you wake up

## 2023-12-28 RX ORDER — SULINDAC 200 MG/1
TABLET ORAL
Qty: 180 TABLET | Refills: 1 | Status: SHIPPED | OUTPATIENT
Start: 2023-12-28

## 2024-01-23 NOTE — PROGRESS NOTES
Subjective:      Patient ID: Kristy Cordoba is a 59 y.o. female. Patient presents with:  Diabetes: 2 month follow up    glucose 102-140    Left ear bothering her for a month. Feels like a itch   No pain no sore throat no voice change    She notes sleep pb    Still smoking.  She does see her gyne every 2 years    ---------------------------------                  03/30/18                            1038            ---------------------------------   BP:             122/78            Site:          Left Arm           Position:        Sitting           Cuff Size:     Medium Adult         Pulse:            102             Resp:             16              Temp:      97.4 °F (36.3 °C)      TempSrc:         Oral             SpO2:             97%             Weight: 170 lb 3.2 oz (77.2 kg)   Height:     5' 6\" (1.676 m)      ---------------------------------  Wt Readings from Last 3 Encounters:  03/30/18 : 170 lb 3.2 oz (77.2 kg)  02/28/18 : 168 lb (76.2 kg)  01/23/18 : 165 lb (74.8 kg)    BP Readings from Last 3 Encounters:  03/30/18 : 122/78  02/28/18 : 110/80  01/23/18 : 130/82    Current Outpatient Prescriptions:     metFORMIN (GLUCOPHAGE-XR) 500 MG extended release tablet, Take 2 tablets by mouth 2 times daily (with meals), Disp: 360 tablet, Rfl: 1    glucose blood VI test strips (ACCU-CHEK SMARTVIEW) strip, Apply 1 each topically daily, Disp: 100 strip, Rfl: 3    pravastatin (PRAVACHOL) 40 MG tablet, TAKE 1 TABLET EVERY DAY, Disp: 90 tablet, Rfl: 1    sulindac (CLINORIL) 200 MG tablet, TAKE 1 TABLET TWICE DAILY WITH MEALS, Disp: 180 tablet, Rfl: 1    Cyanocobalamin (VITAMIN B 12 PO), Take by mouth daily, Disp: , Rfl:     cyclobenzaprine (FLEXERIL) 10 MG tablet, , Disp: , Rfl:     ALPRAZolam (XANAX PO), Take by mouth, Disp: , Rfl:     venlafaxine (EFFEXOR XR) 150 MG extended release capsule, TAKE 1 CAPSULE EVERY DAY, Disp: 90 capsule, Rfl: 1    montelukast
63

## 2024-02-01 RX ORDER — MONTELUKAST SODIUM 10 MG/1
TABLET ORAL
Qty: 90 TABLET | Refills: 3 | Status: SHIPPED | OUTPATIENT
Start: 2024-02-01

## 2024-03-02 SDOH — HEALTH STABILITY: PHYSICAL HEALTH: ON AVERAGE, HOW MANY DAYS PER WEEK DO YOU ENGAGE IN MODERATE TO STRENUOUS EXERCISE (LIKE A BRISK WALK)?: 1 DAY

## 2024-03-02 SDOH — HEALTH STABILITY: PHYSICAL HEALTH: ON AVERAGE, HOW MANY MINUTES DO YOU ENGAGE IN EXERCISE AT THIS LEVEL?: 10 MIN

## 2024-03-02 ASSESSMENT — PATIENT HEALTH QUESTIONNAIRE - PHQ9
SUM OF ALL RESPONSES TO PHQ9 QUESTIONS 1 & 2: 0
SUM OF ALL RESPONSES TO PHQ QUESTIONS 1-9: 0
SUM OF ALL RESPONSES TO PHQ QUESTIONS 1-9: 0
2. FEELING DOWN, DEPRESSED OR HOPELESS: 0
SUM OF ALL RESPONSES TO PHQ QUESTIONS 1-9: 0
SUM OF ALL RESPONSES TO PHQ QUESTIONS 1-9: 0
1. LITTLE INTEREST OR PLEASURE IN DOING THINGS: 0

## 2024-03-02 ASSESSMENT — LIFESTYLE VARIABLES
HOW OFTEN DO YOU HAVE A DRINK CONTAINING ALCOHOL: 2
HOW OFTEN DO YOU HAVE SIX OR MORE DRINKS ON ONE OCCASION: 1
HOW MANY STANDARD DRINKS CONTAINING ALCOHOL DO YOU HAVE ON A TYPICAL DAY: PATIENT DOES NOT DRINK
HOW MANY STANDARD DRINKS CONTAINING ALCOHOL DO YOU HAVE ON A TYPICAL DAY: 0
HOW OFTEN DO YOU HAVE A DRINK CONTAINING ALCOHOL: MONTHLY OR LESS

## 2024-03-05 ENCOUNTER — OFFICE VISIT (OUTPATIENT)
Dept: FAMILY MEDICINE CLINIC | Age: 64
End: 2024-03-05
Payer: MEDICARE

## 2024-03-05 VITALS
BODY MASS INDEX: 22.5 KG/M2 | HEART RATE: 80 BPM | DIASTOLIC BLOOD PRESSURE: 84 MMHG | HEIGHT: 66 IN | WEIGHT: 140 LBS | SYSTOLIC BLOOD PRESSURE: 130 MMHG | TEMPERATURE: 97.2 F

## 2024-03-05 DIAGNOSIS — I10 ESSENTIAL HYPERTENSION: ICD-10-CM

## 2024-03-05 DIAGNOSIS — J44.9 CHRONIC OBSTRUCTIVE PULMONARY DISEASE, UNSPECIFIED COPD TYPE (HCC): ICD-10-CM

## 2024-03-05 DIAGNOSIS — M06.9 RHEUMATOID ARTHRITIS, INVOLVING UNSPECIFIED SITE, UNSPECIFIED WHETHER RHEUMATOID FACTOR PRESENT (HCC): ICD-10-CM

## 2024-03-05 DIAGNOSIS — E11.9 TYPE 2 DIABETES MELLITUS WITHOUT COMPLICATION, WITHOUT LONG-TERM CURRENT USE OF INSULIN (HCC): ICD-10-CM

## 2024-03-05 DIAGNOSIS — E11.9 TYPE 2 DIABETES MELLITUS WITHOUT COMPLICATION, WITHOUT LONG-TERM CURRENT USE OF INSULIN (HCC): Primary | ICD-10-CM

## 2024-03-05 DIAGNOSIS — Z00.00 MEDICARE ANNUAL WELLNESS VISIT, SUBSEQUENT: Primary | ICD-10-CM

## 2024-03-05 LAB
ANION GAP SERPL CALCULATED.3IONS-SCNC: 14 MMOL/L (ref 3–16)
BUN SERPL-MCNC: 9 MG/DL (ref 7–20)
CALCIUM SERPL-MCNC: 9.5 MG/DL (ref 8.3–10.6)
CHLORIDE SERPL-SCNC: 105 MMOL/L (ref 99–110)
CO2 SERPL-SCNC: 23 MMOL/L (ref 21–32)
CREAT SERPL-MCNC: 0.8 MG/DL (ref 0.6–1.2)
FOLATE SERPL-MCNC: 3.44 NG/ML (ref 4.78–24.2)
GFR SERPLBLD CREATININE-BSD FMLA CKD-EPI: >60 ML/MIN/{1.73_M2}
GLUCOSE SERPL-MCNC: 131 MG/DL (ref 70–99)
POTASSIUM SERPL-SCNC: 4.3 MMOL/L (ref 3.5–5.1)
SODIUM SERPL-SCNC: 142 MMOL/L (ref 136–145)
VIT B12 SERPL-MCNC: 282 PG/ML (ref 211–911)

## 2024-03-05 PROCEDURE — 4004F PT TOBACCO SCREEN RCVD TLK: CPT | Performed by: FAMILY MEDICINE

## 2024-03-05 PROCEDURE — 1090F PRES/ABSN URINE INCON ASSESS: CPT | Performed by: FAMILY MEDICINE

## 2024-03-05 PROCEDURE — G8420 CALC BMI NORM PARAMETERS: HCPCS | Performed by: FAMILY MEDICINE

## 2024-03-05 PROCEDURE — 3023F SPIROM DOC REV: CPT | Performed by: FAMILY MEDICINE

## 2024-03-05 PROCEDURE — 3017F COLORECTAL CA SCREEN DOC REV: CPT | Performed by: FAMILY MEDICINE

## 2024-03-05 PROCEDURE — 3046F HEMOGLOBIN A1C LEVEL >9.0%: CPT | Performed by: FAMILY MEDICINE

## 2024-03-05 PROCEDURE — G8427 DOCREV CUR MEDS BY ELIG CLIN: HCPCS | Performed by: FAMILY MEDICINE

## 2024-03-05 PROCEDURE — G0439 PPPS, SUBSEQ VISIT: HCPCS | Performed by: FAMILY MEDICINE

## 2024-03-05 PROCEDURE — 2022F DILAT RTA XM EVC RTNOPTHY: CPT | Performed by: FAMILY MEDICINE

## 2024-03-05 PROCEDURE — 1123F ACP DISCUSS/DSCN MKR DOCD: CPT | Performed by: FAMILY MEDICINE

## 2024-03-05 PROCEDURE — 3079F DIAST BP 80-89 MM HG: CPT | Performed by: FAMILY MEDICINE

## 2024-03-05 PROCEDURE — G8399 PT W/DXA RESULTS DOCUMENT: HCPCS | Performed by: FAMILY MEDICINE

## 2024-03-05 PROCEDURE — G8482 FLU IMMUNIZE ORDER/ADMIN: HCPCS | Performed by: FAMILY MEDICINE

## 2024-03-05 PROCEDURE — 99214 OFFICE O/P EST MOD 30 MIN: CPT | Performed by: FAMILY MEDICINE

## 2024-03-05 PROCEDURE — 3075F SYST BP GE 130 - 139MM HG: CPT | Performed by: FAMILY MEDICINE

## 2024-03-05 SDOH — ECONOMIC STABILITY: INCOME INSECURITY: HOW HARD IS IT FOR YOU TO PAY FOR THE VERY BASICS LIKE FOOD, HOUSING, MEDICAL CARE, AND HEATING?: NOT HARD AT ALL

## 2024-03-05 SDOH — ECONOMIC STABILITY: FOOD INSECURITY: WITHIN THE PAST 12 MONTHS, YOU WORRIED THAT YOUR FOOD WOULD RUN OUT BEFORE YOU GOT MONEY TO BUY MORE.: NEVER TRUE

## 2024-03-05 SDOH — ECONOMIC STABILITY: FOOD INSECURITY: WITHIN THE PAST 12 MONTHS, THE FOOD YOU BOUGHT JUST DIDN'T LAST AND YOU DIDN'T HAVE MONEY TO GET MORE.: NEVER TRUE

## 2024-03-05 ASSESSMENT — PATIENT HEALTH QUESTIONNAIRE - PHQ9
SUM OF ALL RESPONSES TO PHQ QUESTIONS 1-9: 0
1. LITTLE INTEREST OR PLEASURE IN DOING THINGS: 0
SUM OF ALL RESPONSES TO PHQ9 QUESTIONS 1 & 2: 0
SUM OF ALL RESPONSES TO PHQ QUESTIONS 1-9: 0
2. FEELING DOWN, DEPRESSED OR HOPELESS: 0
SUM OF ALL RESPONSES TO PHQ QUESTIONS 1-9: 0
SUM OF ALL RESPONSES TO PHQ QUESTIONS 1-9: 0

## 2024-03-05 ASSESSMENT — LIFESTYLE VARIABLES
HOW OFTEN DO YOU HAVE A DRINK CONTAINING ALCOHOL: MONTHLY OR LESS
HOW MANY STANDARD DRINKS CONTAINING ALCOHOL DO YOU HAVE ON A TYPICAL DAY: 1 OR 2

## 2024-03-05 NOTE — PATIENT INSTRUCTIONS
and call your doctor if you are having problems. It's also a good idea to know your test results and keep a list of the medicines you take.  Current as of: February 26, 2023               Content Version: 13.9  © 2006-2023 [email or web address]   Care instructions adapted under license by [employer]. If you have questions about a medical condition or this instruction, always ask your healthcare professional. [email or web address] disclaims any warranty or liability for your use of this information.           A Healthy Heart: Care Instructions  Your Care Instructions     Coronary artery disease, also called heart disease, occurs when a substance called plaque builds up in the vessels that supply oxygen-rich blood to your heart muscle. This can narrow the blood vessels and reduce blood flow. A heart attack happens when blood flow is completely blocked. A high-fat diet, smoking, and other factors increase the risk of heart disease.  Your doctor has found that you have a chance of having heart disease. You can do lots of things to keep your heart healthy. It may not be easy, but you can change your diet, exercise more, and quit smoking. These steps really work to lower your chance of heart disease.  Follow-up care is a key part of your treatment and safety. Be sure to make and go to all appointments, and call your doctor if you are having problems. It's also a good idea to know your test results and keep a list of the medicines you take.  How can you care for yourself at home?  Diet    Use less salt when you cook and eat. This helps lower your blood pressure. Taste food before salting. Add only a little salt when you think you need it. With time, your taste buds will adjust to less salt.     Eat fewer snack items, fast foods, canned soups, and other high-salt, high-fat, processed foods.     Read food labels and try to avoid saturated and trans fats. They increase your risk of heart disease by raising

## 2024-03-05 NOTE — PROGRESS NOTES
TempSrc:    Temporal        Weight: 63.5 kg (140 lb)    Height: 1.666 m (5' 5.6\")  ---------------------------  Body mass index is 22.87 kg/m².     Wt Readings from Last 3 Encounters:    she had not eating after the surgery. She also cut back intentionally.   03/05/24 : 63.5 kg (140 lb)  12/18/23 : 69.9 kg (154 lb 3.2 oz)  12/15/23 : 70.4 kg (155 lb 3.2 oz)    BP Readings from Last 3 Encounters:  03/05/24 : 130/84  12/18/23 : 134/70  12/15/23 : 136/74                Review of Systems    Objective:   Physical Exam  Constitutional:       General: She is not in acute distress.     Appearance: Normal appearance. She is well-developed. She is not ill-appearing or diaphoretic.   Neck:      Thyroid: No thyroid mass or thyromegaly.   Cardiovascular:      Rate and Rhythm: Normal rate and regular rhythm.      Pulses:           Dorsalis pedis pulses are 2+ on the right side and 2+ on the left side.        Posterior tibial pulses are 2+ on the right side and 2+ on the left side.      Heart sounds: Normal heart sounds. No murmur heard.     No friction rub. No gallop.      Comments:   No edema legs   Pulmonary:      Effort: Pulmonary effort is normal. No tachypnea, accessory muscle usage or respiratory distress.      Breath sounds: Normal breath sounds. No decreased breath sounds, wheezing, rhonchi or rales.   Abdominal:      General: Bowel sounds are normal. There is no distension or abdominal bruit.      Palpations: Abdomen is soft. There is no hepatomegaly, splenomegaly, mass or pulsatile mass.      Tenderness: There is no abdominal tenderness. There is no guarding.   Musculoskeletal:      Cervical back: Neck supple.      Comments: Feet are warm, pink, dry. No lesions and the monofilament is normal both sides.       Lymphadenopathy:      Cervical: No cervical adenopathy.      Upper Body:      Right upper body: No supraclavicular adenopathy.      Left upper body: No supraclavicular adenopathy.   Skin:     General:

## 2024-03-05 NOTE — PROGRESS NOTES
Medicare Annual Wellness Visit    Kendra Sears is here for Medicare AWV    Assessment & Plan   Medicare annual wellness visit, subsequent  Recommendations for Preventive Services Due: see orders and patient instructions/AVS.  Recommended screening schedule for the next 5-10 years is provided to the patient in written form: see Patient Instructions/AVS.     Return in 1 year (on 3/5/2025).     Subjective   Medicare AWV    Patient's complete Health Risk Assessment and screening values have been reviewed and are found in Flowsheets. The following problems were reviewed today and where indicated follow up appointments were made and/or referrals ordered.    Positive Risk Factor Screenings with Interventions:                     ADL's:   Patient reports needing help with:  Select all that apply: (!) Laundry, Housekeeping  Interventions:  See AVS for additional education material     Tobacco Use:  Tobacco Use: High Risk (3/5/2024)    Patient History     Smoking Tobacco Use: Every Day     Smokeless Tobacco Use: Never     Passive Exposure: Not on file     E-cigarette/Vaping       Questions Responses    E-cigarette/Vaping Use     Start Date     Passive Exposure No    Quit Date     Counseling Given No    Comments           Interventions:  See AVS for additional education material                      Objective   There were no vitals filed for this visit.   There is no height or weight on file to calculate BMI.               Allergies   Allergen Reactions    Azithromycin Itching, Rash, Other (See Comments) and Swelling     Other reaction(s): Other (See Comments)  FEVER  FEVER    Baclofen Nausea And Vomiting, Itching and Rash    Nuts [Peanut Oil] Anaphylaxis    Peanut-Containing Drug Products Anaphylaxis    Sulfa Antibiotics Anaphylaxis and Rash    Adalimumab Other (See Comments)     \"immune system crashes\"    Avelox [Moxifloxacin Hcl In Nacl]     Bactrim     Belbuca [Buprenorphine Hcl] Itching and Other (See Comments)

## 2024-03-06 LAB
EST. AVERAGE GLUCOSE BLD GHB EST-MCNC: 105.4 MG/DL
HBA1C MFR BLD: 5.3 %

## 2024-03-06 RX ORDER — VITAMIN B COMPLEX
1 CAPSULE ORAL DAILY
Qty: 90 CAPSULE | Refills: 0 | COMMUNITY
Start: 2024-03-06

## 2024-03-27 ENCOUNTER — OFFICE VISIT (OUTPATIENT)
Dept: PULMONOLOGY | Age: 64
End: 2024-03-27
Payer: MEDICARE

## 2024-03-27 VITALS
WEIGHT: 142.4 LBS | SYSTOLIC BLOOD PRESSURE: 130 MMHG | RESPIRATION RATE: 18 BRPM | BODY MASS INDEX: 22.88 KG/M2 | HEIGHT: 66 IN | DIASTOLIC BLOOD PRESSURE: 80 MMHG | TEMPERATURE: 97.3 F | HEART RATE: 70 BPM | OXYGEN SATURATION: 100 %

## 2024-03-27 DIAGNOSIS — Z87.891 PERSONAL HISTORY OF TOBACCO USE: ICD-10-CM

## 2024-03-27 DIAGNOSIS — J44.9 CHRONIC OBSTRUCTIVE PULMONARY DISEASE, UNSPECIFIED COPD TYPE (HCC): Primary | ICD-10-CM

## 2024-03-27 DIAGNOSIS — G47.33 OBSTRUCTIVE SLEEP APNEA SYNDROME: ICD-10-CM

## 2024-03-27 PROCEDURE — 1123F ACP DISCUSS/DSCN MKR DOCD: CPT | Performed by: INTERNAL MEDICINE

## 2024-03-27 PROCEDURE — G0296 VISIT TO DETERM LDCT ELIG: HCPCS | Performed by: INTERNAL MEDICINE

## 2024-03-27 PROCEDURE — 3017F COLORECTAL CA SCREEN DOC REV: CPT | Performed by: INTERNAL MEDICINE

## 2024-03-27 PROCEDURE — G8427 DOCREV CUR MEDS BY ELIG CLIN: HCPCS | Performed by: INTERNAL MEDICINE

## 2024-03-27 PROCEDURE — 3075F SYST BP GE 130 - 139MM HG: CPT | Performed by: INTERNAL MEDICINE

## 2024-03-27 PROCEDURE — 99213 OFFICE O/P EST LOW 20 MIN: CPT | Performed by: INTERNAL MEDICINE

## 2024-03-27 PROCEDURE — 1090F PRES/ABSN URINE INCON ASSESS: CPT | Performed by: INTERNAL MEDICINE

## 2024-03-27 PROCEDURE — 3023F SPIROM DOC REV: CPT | Performed by: INTERNAL MEDICINE

## 2024-03-27 PROCEDURE — G8420 CALC BMI NORM PARAMETERS: HCPCS | Performed by: INTERNAL MEDICINE

## 2024-03-27 PROCEDURE — G8482 FLU IMMUNIZE ORDER/ADMIN: HCPCS | Performed by: INTERNAL MEDICINE

## 2024-03-27 PROCEDURE — 3079F DIAST BP 80-89 MM HG: CPT | Performed by: INTERNAL MEDICINE

## 2024-03-27 PROCEDURE — 4004F PT TOBACCO SCREEN RCVD TLK: CPT | Performed by: INTERNAL MEDICINE

## 2024-03-27 PROCEDURE — G8399 PT W/DXA RESULTS DOCUMENT: HCPCS | Performed by: INTERNAL MEDICINE

## 2024-03-27 ASSESSMENT — COPD QUESTIONNAIRES
CAT_TOTALSCORE: 18
QUESTION6_LEAVINGHOUSE: 1
QUESTION7_SLEEPQUALITY: 3
QUESTION3_CHESTTIGHTNESS: 2
QUESTION4_WALKINCLINE: 3
QUESTION1_COUGHFREQUENCY: 3
COPD: 1
QUESTION5_HOMEACTIVITIES: 2
QUESTION8_ENERGYLEVEL: 2
QUESTION2_CHESTPHLEGM: 2

## 2024-03-27 NOTE — PROGRESS NOTES
[facility] PULMONARY, SLEEP, AND CRITICAL CARE    Kendra Sears (:  1960) is a 63 y.o. female,New patient, here for evaluation of the following chief complaint(s):  COPD         ASSESSMENT/PLAN:  1. Chronic obstructive pulmonary disease, unspecified COPD type (HCC)  Assessment & Plan:  PFTs with moderate COPD, FEV1 71%  -doing better on trelegy  -Singulair  2. Obstructive sleep apnea syndrome  3. Personal history of tobacco use  -     [place] VISIT TO DISCUSS LUNG CA SCREEN W LDCT  -     CT Lung Screen (Initial/Annual/Baseline); Future          Return in about 6 months (around 2024).  Future Appointments   Date Time Provider Department Center   2024  1:20 PM Yoan Mccray MD [place]  [place] - DYD   10/2/2024  1:00 PM Alvarado Dasilva MD  PULM [facility]            Subjective   SUBJECTIVE/OBJECTIVE:  Follow-up smoker with centrilobular emphysema and possible asthma overlap  -Recent CT unremarkable  -Doing better on Trelegy      COPD        Review of Systems   Constitutional: Negative.    Cardiovascular: Negative.    Musculoskeletal: Negative.    Neurological: Negative.    Psychiatric/Behavioral: Negative.            Objective   Physical Exam     Gen:  No acute distress.   Eyes: PERRL. EOMI. Anicteric sclera. No conjunctival injection.   ENT: No discharge. oropharynx clear. External appearance of ears and nose normal.  Neck: Trachea midline. No mass   Resp:  No crackles. No wheezes. No rhonchi. No dullness on percussion.  CV: Regular rate. Regular rhythm. No murmur or rub. No edema.   GI: Soft, Non-tender. Non-distended. +BS  Skin: Warm, dry, w/o erythema.   Lymph: No cervical or supraclavicular LAD.   M/S: No cyanosis. No clubbing.    Neuro:  no focal neurologic deficit.  Moves all extremities  Psych: Awake and alert, Oriented x 3.  Judgement and insight appropriate.  Mood stable.    I spent 21 minutes on this case today, >50% was face-to-face in discussion of the diagnosis and the

## 2024-03-27 NOTE — PATIENT INSTRUCTIONS
follow-up, he or she will help you understand what to do next.  After a lung cancer screening, you can go back to your usual activities right away.  A lung cancer screening test can't tell if you have lung cancer. If your results are positive, your doctor can't tell whether an abnormal finding is a harmless nodule, cancer, or something else without doing more tests.  What can you do to help prevent lung cancer?  Some lung cancers can't be prevented. But if you smoke, quitting smoking is the best step you can take to prevent lung cancer. If you want to quit, your doctor can recommend medicines or other ways to help.  Follow-up care is a key part of your treatment and safety. Be sure to make and go to all appointments, and call your doctor if you are having problems. It's also a good idea to know your test results and keep a list of the medicines you take.  Where can you learn more?  Go to https://www.[employer].net/patientEd and enter Q940 to learn more about \"Learning About Lung Cancer Screening.\"  Current as of: October 25, 2023               Content Version: 14.0  © 1442-9220 [employer].   Care instructions adapted under license by [employer]. If you have questions about a medical condition or this instruction, always ask your healthcare professional. [employer] disclaims any warranty or liability for your use of this information.

## 2024-04-05 NOTE — PROGRESS NOTES
aortic regurgitation.   No evidence of aortic stenosis.  The mitral valve is slightly thickened with normal excursion. Mild-moderate mitral regurgitation. Mild tricuspid regurgitation with a PASP estimated at 21 mmHg. Small anterior pericardial effusion.    PFT 5/4/22  Impression:  1. There is moderate obstruction present  2. There is no significant reversibility with bronchodilator [Increase in FEV1 => 12% of control and => 200 ml]  3. There is no significant hyperinflation or air trapping  4. There is mild reduction in diffusion capacity  Comment:   PFT findings are consistent with moderate COPD.      Cardiac MRI 5/4/22  1. Aortic valvular regurgitation is demonstrated, regurgitant fraction estimated at 24%.   2. No evidence of abnormal myocardial signal. No delayed myocardial hyperenhancement/fibrosis.   3. Left ventricular ejection fraction lower limits normal, calculated at 48%, with otherwise normal wall motion     Stress test: 10/26/2023  Normal myocardial perfusion study.  Normal LV size and systolic function.    Echo 10/26/2023  Left ventricle is dilated with normal wall thickness. Ejection fraction is  visually estimated to be 60-65%. No regional wall motion abnormalities are  noted. Indeterminate diastolic function. E/e' = 10.7.  The aortic valve appears sclerotic but opens well.Consider bicuspid aortic  valve  Moderate to severe aortic regurgitation.  Trace of mitral regurgitation.  IVC size is normal (<2.1cm) and collapses > 50% with respiration consistent  with normal RA pressure (3mmHg).  Unable to estimate pulmonary artery pressure secondary to incomplete TR jet  envelope.    Assessment/Plan:   1) Chronic diastolic heart failure. EF 60-65%. Lisinopril discontinued due to cough and hydrochlorothiazide discontinued with symptomatic hypotension. No SGLT2i due to UTI and cost prohibitive. Patient appears euvolemic, but reports worsening shortness of breath. Continue losartan 100 mg daily and Coreg 25 mg

## 2024-04-19 ENCOUNTER — OFFICE VISIT (OUTPATIENT)
Dept: CARDIOLOGY CLINIC | Age: 64
End: 2024-04-19
Payer: MEDICARE

## 2024-04-19 VITALS
WEIGHT: 143 LBS | SYSTOLIC BLOOD PRESSURE: 116 MMHG | BODY MASS INDEX: 22.98 KG/M2 | HEIGHT: 66 IN | OXYGEN SATURATION: 98 % | HEART RATE: 72 BPM | DIASTOLIC BLOOD PRESSURE: 72 MMHG

## 2024-04-19 DIAGNOSIS — I25.84 CORONARY ARTERY CALCIFICATION: ICD-10-CM

## 2024-04-19 DIAGNOSIS — I35.8 AORTIC VALVE SCLEROSIS: ICD-10-CM

## 2024-04-19 DIAGNOSIS — I50.32 CHRONIC DIASTOLIC HEART FAILURE (HCC): Primary | ICD-10-CM

## 2024-04-19 DIAGNOSIS — I35.1 NON-RHEUMATIC AORTIC REGURGITATION: ICD-10-CM

## 2024-04-19 DIAGNOSIS — I25.10 CORONARY ARTERY CALCIFICATION: ICD-10-CM

## 2024-04-19 DIAGNOSIS — I10 ESSENTIAL HYPERTENSION: ICD-10-CM

## 2024-04-19 PROCEDURE — G8399 PT W/DXA RESULTS DOCUMENT: HCPCS | Performed by: INTERNAL MEDICINE

## 2024-04-19 PROCEDURE — G8420 CALC BMI NORM PARAMETERS: HCPCS | Performed by: INTERNAL MEDICINE

## 2024-04-19 PROCEDURE — G8427 DOCREV CUR MEDS BY ELIG CLIN: HCPCS | Performed by: INTERNAL MEDICINE

## 2024-04-19 PROCEDURE — 93000 ELECTROCARDIOGRAM COMPLETE: CPT | Performed by: INTERNAL MEDICINE

## 2024-04-19 PROCEDURE — 3078F DIAST BP <80 MM HG: CPT | Performed by: INTERNAL MEDICINE

## 2024-04-19 PROCEDURE — 3074F SYST BP LT 130 MM HG: CPT | Performed by: INTERNAL MEDICINE

## 2024-04-19 PROCEDURE — 3017F COLORECTAL CA SCREEN DOC REV: CPT | Performed by: INTERNAL MEDICINE

## 2024-04-19 PROCEDURE — 4004F PT TOBACCO SCREEN RCVD TLK: CPT | Performed by: INTERNAL MEDICINE

## 2024-04-19 PROCEDURE — 1090F PRES/ABSN URINE INCON ASSESS: CPT | Performed by: INTERNAL MEDICINE

## 2024-04-19 PROCEDURE — 1123F ACP DISCUSS/DSCN MKR DOCD: CPT | Performed by: INTERNAL MEDICINE

## 2024-04-19 PROCEDURE — 99214 OFFICE O/P EST MOD 30 MIN: CPT | Performed by: INTERNAL MEDICINE

## 2024-05-16 ENCOUNTER — OFFICE VISIT (OUTPATIENT)
Dept: FAMILY MEDICINE CLINIC | Age: 64
End: 2024-05-16

## 2024-05-16 VITALS
HEIGHT: 65 IN | BODY MASS INDEX: 23.82 KG/M2 | TEMPERATURE: 97.4 F | DIASTOLIC BLOOD PRESSURE: 80 MMHG | HEART RATE: 68 BPM | SYSTOLIC BLOOD PRESSURE: 118 MMHG | WEIGHT: 143 LBS

## 2024-05-16 DIAGNOSIS — I10 ESSENTIAL HYPERTENSION: ICD-10-CM

## 2024-05-16 DIAGNOSIS — G89.29 CHRONIC MIDLINE LOW BACK PAIN, UNSPECIFIED WHETHER SCIATICA PRESENT: ICD-10-CM

## 2024-05-16 DIAGNOSIS — M06.9 RHEUMATOID ARTHRITIS, INVOLVING UNSPECIFIED SITE, UNSPECIFIED WHETHER RHEUMATOID FACTOR PRESENT (HCC): ICD-10-CM

## 2024-05-16 DIAGNOSIS — M54.50 CHRONIC MIDLINE LOW BACK PAIN, UNSPECIFIED WHETHER SCIATICA PRESENT: ICD-10-CM

## 2024-05-16 DIAGNOSIS — Z01.818 PREOP EXAMINATION: Primary | ICD-10-CM

## 2024-05-16 DIAGNOSIS — E78.2 MIXED HYPERLIPIDEMIA: ICD-10-CM

## 2024-05-16 DIAGNOSIS — G47.33 OBSTRUCTIVE SLEEP APNEA SYNDROME: ICD-10-CM

## 2024-05-16 ASSESSMENT — ENCOUNTER SYMPTOMS
CHEST TIGHTNESS: 0
SHORTNESS OF BREATH: 0
DIARRHEA: 0
BLOOD IN STOOL: 0
TROUBLE SWALLOWING: 0
COUGH: 0
CONSTIPATION: 0
ABDOMINAL PAIN: 0
VOMITING: 0
SORE THROAT: 0
NAUSEA: 0
ABDOMINAL DISTENTION: 0

## 2024-05-16 NOTE — PROGRESS NOTES
Subjective:      Patient ID: Kendra Sears is a 63 y.o. female.    Chief Complaint   Patient presents with    Pre-op Exam     Spinal Cord Stimulator Revision on 5- by Dr. Luz Marina Morales at [facility]. Fax [phone].        Patient presents with:  Pre-op Exam: Spinal Cord Stimulator Revision on 5- by Dr. Luz Marina Morales at [facility]. Fax [phone].    Here for the above   Overall well but the back pain  She is to have revision    Allergies:     -- Azithromycin -- Itching, Rash, Other (See Comments)                           and Swelling    --  Other reaction(s): Other (See Comments)             FEVER             FEVER   -- Baclofen -- Nausea And Vomiting, Itching and                            Rash   -- Nuts [Peanut Oil] -- Anaphylaxis   -- Peanut-Containing Drug Products -- Anaphylaxis   -- Sulfa Antibiotics -- Anaphylaxis and Rash   -- Adalimumab -- Other (See Comments)    --  \"immune system crashes\"   -- Avelox [Moxifloxacin Hcl In Nacl]    -- Bactrim    -- Belbuca [Buprenorphine Hcl] -- Itching and Other (See Comments)    --  Headaches, insomina   -- Infliximab -- Other (See Comments)    --  \"immune system crashes\"   -- Lisinopril -- Other (See Comments)    --  Cough   -- Zithromax [Azithromycin Dihydrate]    -- Adhesive Tape -- Rash    --  PAPER TAPE IS OK             PAPER TAPE IS OK   -- Moxifloxacin -- Rash, Other (See Comments) and                            Swelling    --  Other reaction(s): Other (See Comments)             FEVER             FEVER             Other reaction(s): Fever   -- Sulfamethoxazole-Trimethoprim -- Other (See Comments), Rash and                            Swelling    --  Other reaction(s): Other (See Comments)             FEVER             FEVER      height is 1.651 m (5' 5\") and weight is 64.9 kg (143 lb). Her temporal temperature is 97.4 °F (36.3 °C). Her blood pressure is 118/80 and her pulse is 68.     Current

## 2024-05-16 NOTE — PATIENT INSTRUCTIONS
On the morning of the surgery take the carvedilol with just enough water to get the pill down as soon as you wake up   Stop the sulindac on week before  No vitamin or supplements the week before   For pain or fever take just tylenol     Remember to do the mammogram in August. And the lung screening test in July  Keep the August appointment

## 2024-07-19 ENCOUNTER — HOSPITAL ENCOUNTER (OUTPATIENT)
Dept: CT IMAGING | Age: 64
Discharge: HOME | End: 2024-07-19
Payer: MEDICARE

## 2024-07-19 DIAGNOSIS — Z87.891 PERSONAL HISTORY OF TOBACCO USE: ICD-10-CM

## 2024-07-19 PROCEDURE — 71271 CT THORAX LUNG CANCER SCR C-: CPT

## 2024-07-21 DIAGNOSIS — J44.9 CHRONIC OBSTRUCTIVE PULMONARY DISEASE, UNSPECIFIED COPD TYPE (HCC): ICD-10-CM

## 2024-07-22 RX ORDER — FLUTICASONE FUROATE, UMECLIDINIUM BROMIDE AND VILANTEROL TRIFENATATE 200; 62.5; 25 UG/1; UG/1; UG/1
1 POWDER RESPIRATORY (INHALATION) DAILY
Qty: 60 EACH | Refills: 3 | Status: SHIPPED | OUTPATIENT
Start: 2024-07-22

## 2024-07-26 ENCOUNTER — TELEPHONE (OUTPATIENT)
Dept: PULMONOLOGY | Age: 64
End: 2024-07-26

## 2024-07-26 DIAGNOSIS — B37.0 ORAL CANDIDIASIS: Primary | ICD-10-CM

## 2024-07-26 RX ORDER — FLUCONAZOLE 100 MG/1
100 TABLET ORAL DAILY
Qty: 7 TABLET | Refills: 0 | Status: SHIPPED | OUTPATIENT
Start: 2024-07-26 | End: 2024-08-02

## 2024-07-26 NOTE — TELEPHONE ENCOUNTER
Pt notified of message  States she does do all those tings and wonder swhy after a year of using this would happen

## 2024-07-26 NOTE — TELEPHONE ENCOUNTER
Patient is using Trelegy and now has thrush. She wants to know if a script can be called into pharmacy. She prefers tablets vs gargle.

## 2024-08-01 ENCOUNTER — TELEPHONE (OUTPATIENT)
Dept: PULMONOLOGY | Age: 64
End: 2024-08-01

## 2024-08-01 NOTE — TELEPHONE ENCOUNTER
Pt took last pill for thrush today and she states she still has it    Please advise    Wade Mendosa

## 2024-08-02 RX ORDER — FLUCONAZOLE 150 MG/1
150 TABLET ORAL DAILY
Qty: 3 TABLET | Refills: 0 | Status: SHIPPED | OUTPATIENT
Start: 2024-08-02

## 2024-08-06 ENCOUNTER — OFFICE VISIT (OUTPATIENT)
Dept: FAMILY MEDICINE CLINIC | Age: 64
End: 2024-08-06

## 2024-08-06 VITALS
WEIGHT: 147.4 LBS | SYSTOLIC BLOOD PRESSURE: 128 MMHG | TEMPERATURE: 97.3 F | HEIGHT: 65 IN | DIASTOLIC BLOOD PRESSURE: 78 MMHG | HEART RATE: 72 BPM | BODY MASS INDEX: 24.56 KG/M2

## 2024-08-06 DIAGNOSIS — E53.8 LOW FOLATE: ICD-10-CM

## 2024-08-06 DIAGNOSIS — E11.9 TYPE 2 DIABETES MELLITUS WITHOUT COMPLICATION, WITHOUT LONG-TERM CURRENT USE OF INSULIN (HCC): ICD-10-CM

## 2024-08-06 DIAGNOSIS — E11.9 TYPE 2 DIABETES MELLITUS WITHOUT COMPLICATION, WITHOUT LONG-TERM CURRENT USE OF INSULIN (HCC): Primary | ICD-10-CM

## 2024-08-06 DIAGNOSIS — Z12.31 BREAST CANCER SCREENING BY MAMMOGRAM: ICD-10-CM

## 2024-08-06 NOTE — PROGRESS NOTES
Subjective:      Patient ID: Kendra Sears is a 63 y.o. female.    Chief Complaint   Patient presents with    Check-Up     Diabetes, hypertension, lipids         Patient presents with:  Check-Up: Diabetes, hypertension, lipids     Here for the above   She is well   No c/o    YOB: 1960    Date of Visit:  8/6/2024     -- Azithromycin -- Itching, Rash, Other (See Comments)                           and Swelling    --  Other reaction(s): Other (See Comments)             FEVER             FEVER   -- Baclofen -- Nausea And Vomiting, Itching and                            Rash   -- Nuts [Peanut Oil] -- Anaphylaxis   -- Peanut-Containing Drug Products -- Anaphylaxis   -- Sulfa Antibiotics -- Anaphylaxis and Rash   -- Adalimumab -- Other (See Comments)    --  \"immune system crashes\"   -- Avelox [Moxifloxacin Hcl In Nacl]    -- Bactrim    -- Belbuca [Buprenorphine Hcl] -- Itching and Other (See Comments)    --  Headaches, insomina   -- Infliximab -- Other (See Comments)    --  \"immune system crashes\"   -- Lisinopril -- Other (See Comments)    --  Cough   -- Zithromax [Azithromycin Dihydrate]    -- Adhesive Tape -- Rash    --  PAPER TAPE IS OK             PAPER TAPE IS OK   -- Moxifloxacin -- Rash, Other (See Comments) and                            Swelling    --  Other reaction(s): Other (See Comments)             FEVER             FEVER             Other reaction(s): Fever   -- Sulfamethoxazole-Trimethoprim -- Other (See Comments), Rash and                            Swelling    --  Other reaction(s): Other (See Comments)             FEVER             FEVER    Current Outpatient Medications:  fluconazole (DIFLUCAN) 150 MG tablet, Take 1 tablet by mouth daily, Disp: 3 tablet, Rfl: 0  nystatin (MYCOSTATIN) 335643 UNIT/ML suspension, Take 5 mLs by mouth 4 times daily for 14 days, Disp: 280 mL, Rfl: 0  fluticasone-umeclidin-vilant (TRELEGY ELLIPTA) 200-62.5-25 MCG/ACT AEPB inhaler, INHALE 1 PUFF INTO THE

## 2024-08-07 LAB
EST. AVERAGE GLUCOSE BLD GHB EST-MCNC: 122.6 MG/DL
FOLATE SERPL-MCNC: >20 NG/ML (ref 4.78–24.2)
HBA1C MFR BLD: 5.9 %
VIT B12 SERPL-MCNC: 436 PG/ML (ref 211–911)

## 2024-08-15 ENCOUNTER — HOSPITAL ENCOUNTER (OUTPATIENT)
Dept: WOMENS IMAGING | Age: 64
Discharge: HOME | End: 2024-08-15
Payer: MEDICARE

## 2024-08-15 VITALS — WEIGHT: 145 LBS | HEIGHT: 67 IN | BODY MASS INDEX: 22.76 KG/M2

## 2024-08-15 DIAGNOSIS — Z12.31 BREAST CANCER SCREENING BY MAMMOGRAM: ICD-10-CM

## 2024-08-15 PROCEDURE — 77063 BREAST TOMOSYNTHESIS BI: CPT

## 2024-08-19 RX ORDER — CARVEDILOL 25 MG/1
TABLET ORAL
Qty: 180 TABLET | Refills: 3 | Status: SHIPPED | OUTPATIENT
Start: 2024-08-19

## 2024-08-19 RX ORDER — SULINDAC 200 MG/1
TABLET ORAL
Qty: 180 TABLET | Refills: 1 | Status: SHIPPED | OUTPATIENT
Start: 2024-08-19

## 2024-08-19 NOTE — TELEPHONE ENCOUNTER
Last OV: 4/19/24  Next OV: 12/20/24  Last refill: 7/3/23 #180 3 R/F  Most recent Labs: 5/14/24  Last EKG (if needed): 4/19/24

## 2024-09-03 ENCOUNTER — OFFICE VISIT (OUTPATIENT)
Dept: FAMILY MEDICINE CLINIC | Age: 64
End: 2024-09-03

## 2024-09-03 ENCOUNTER — HOSPITAL ENCOUNTER (OUTPATIENT)
Dept: GENERAL RADIOLOGY | Age: 64
Discharge: HOME | End: 2024-09-03
Payer: MEDICARE

## 2024-09-03 ENCOUNTER — HOSPITAL ENCOUNTER (OUTPATIENT)
Age: 64
Discharge: HOME | End: 2024-09-03
Payer: MEDICARE

## 2024-09-03 VITALS
BODY MASS INDEX: 23.1 KG/M2 | DIASTOLIC BLOOD PRESSURE: 80 MMHG | SYSTOLIC BLOOD PRESSURE: 128 MMHG | HEIGHT: 67 IN | WEIGHT: 147.2 LBS | TEMPERATURE: 97.4 F

## 2024-09-03 DIAGNOSIS — K14.0 GLOSSITIS: ICD-10-CM

## 2024-09-03 DIAGNOSIS — R05.1 ACUTE COUGH: Primary | ICD-10-CM

## 2024-09-03 DIAGNOSIS — R05.1 ACUTE COUGH: ICD-10-CM

## 2024-09-03 PROCEDURE — 71046 X-RAY EXAM CHEST 2 VIEWS: CPT

## 2024-09-03 NOTE — PROGRESS NOTES
Chemotherapy Verification - PRIMARY RN    D2C3    Height = 172cm  Weight = 85.5kg  BSA = 2.02m^2       Medication: etoposide  Dose: 100mg/m^2  Calculated Dose: 202mg                                  I confirm this process was performed independently with the BSA and all final chemotherapy dosing calculations congruent.  Any discrepancies of 10% or greater have been addressed with the chemotherapy pharmacist. The resolution of the discrepancy has been documented in the EPIC progress notes.        Subjective:      Patient ID: Kendra Sears is a 63 y.o. female.    Chief Complaint   Patient presents with    Cough     Pain in lung area on back        Patient presents with:  Cough: Pain in lung area on back    Here for the above   She has had a cough since choking on a mouth rinse for thrush?    She notes soreness of throat and pnd  She has a slight productive cough  No blood no fever  Started 1-2 week ago     She had some pain in the left back when she coughed  And is actually better now   She notes congestion feeling.     Irritated tongue for a month     YOB: 1960    Date of Visit:  9/3/2024     -- Azithromycin -- Itching, Rash, Other (See Comments)                           and Swelling    --  Other reaction(s): Other (See Comments)             FEVER             FEVER   -- Baclofen -- Nausea And Vomiting, Itching and                            Rash   -- Nuts [Peanut Oil] -- Anaphylaxis   -- Peanut-Containing Drug Products -- Anaphylaxis   -- Sulfa Antibiotics -- Anaphylaxis and Rash   -- Adalimumab -- Other (See Comments)    --  \"immune system crashes\"   -- Avelox [Moxifloxacin Hcl In Nacl]    -- Bactrim    -- Belbuca [Buprenorphine Hcl] -- Itching and Other (See Comments)    --  Headaches, insomina   -- Infliximab -- Other (See Comments)    --  \"immune system crashes\"   -- Lisinopril -- Other (See Comments)    --  Cough   -- Zithromax [Azithromycin Dihydrate]    -- Adhesive Tape -- Rash    --  PAPER TAPE IS OK             PAPER TAPE IS OK   -- Moxifloxacin -- Rash, Other (See Comments) and                            Swelling    --  Other reaction(s): Other (See Comments)             FEVER             FEVER             Other reaction(s): Fever   -- Sulfamethoxazole-Trimethoprim -- Other (See Comments), Rash and                            Swelling    --  Other reaction(s): Other (See Comments)             FEVER             FEVER    Current Outpatient Medications:  sulindac (CLINORIL) 200 MG

## 2024-09-05 ENCOUNTER — HOSPITAL ENCOUNTER (EMERGENCY)
Age: 64
Discharge: HOME | End: 2024-09-05
Attending: EMERGENCY MEDICINE
Payer: MEDICARE

## 2024-09-05 VITALS
OXYGEN SATURATION: 97 % | TEMPERATURE: 97.6 F | DIASTOLIC BLOOD PRESSURE: 93 MMHG | SYSTOLIC BLOOD PRESSURE: 143 MMHG | HEART RATE: 63 BPM | RESPIRATION RATE: 17 BRPM

## 2024-09-05 DIAGNOSIS — S05.02XA ABRASION OF LEFT CORNEA, INITIAL ENCOUNTER: Primary | ICD-10-CM

## 2024-09-05 PROCEDURE — 99283 EMERGENCY DEPT VISIT LOW MDM: CPT

## 2024-09-05 RX ORDER — TOBRAMYCIN 0.3 %
OINTMENT (GRAM) OPHTHALMIC (EYE)
Qty: 3.5 G | Refills: 0 | Status: SHIPPED | OUTPATIENT
Start: 2024-09-05 | End: 2024-09-05

## 2024-09-05 RX ORDER — TOBRAMYCIN 0.3 %
OINTMENT (GRAM) OPHTHALMIC (EYE)
Qty: 3.5 G | Refills: 0 | Status: SHIPPED | OUTPATIENT
Start: 2024-09-05 | End: 2024-09-15

## 2024-09-05 RX ORDER — TETRACAINE HYDROCHLORIDE 5 MG/ML
1 SOLUTION OPHTHALMIC ONCE
Status: DISCONTINUED | OUTPATIENT
Start: 2024-09-05 | End: 2024-09-05 | Stop reason: HOSPADM

## 2024-09-05 ASSESSMENT — PAIN - FUNCTIONAL ASSESSMENT: PAIN_FUNCTIONAL_ASSESSMENT: NONE - DENIES PAIN

## 2024-09-05 NOTE — ED TRIAGE NOTES
Pt presents to the ED with c/o waking up this am with foreign body in her eye. States she can barely open in and feels like it's on her top eyelid

## 2024-09-05 NOTE — DISCHARGE INSTRUCTIONS
Please follow-up with your primary care physician within the next 1 to 2 days for reevaluation of your symptoms.  I also recommend that you follow-up with your eye doctor either by calling your established physician or by calling [facility] at the above constant information.  Please call this morning to schedule an early appointment.    Use antibiotic ointment as prescribed.  You may also use ibuprofen and Tylenol as needed for pain.

## 2024-09-09 ENCOUNTER — OFFICE VISIT (OUTPATIENT)
Dept: ENT CLINIC | Age: 64
End: 2024-09-09
Payer: MEDICARE

## 2024-09-09 VITALS
WEIGHT: 149 LBS | DIASTOLIC BLOOD PRESSURE: 70 MMHG | OXYGEN SATURATION: 97 % | BODY MASS INDEX: 23.39 KG/M2 | HEIGHT: 67 IN | HEART RATE: 71 BPM | SYSTOLIC BLOOD PRESSURE: 142 MMHG

## 2024-09-09 DIAGNOSIS — K21.9 LARYNGOPHARYNGEAL REFLUX (LPR): ICD-10-CM

## 2024-09-09 DIAGNOSIS — F17.200 SMOKING: ICD-10-CM

## 2024-09-09 DIAGNOSIS — B37.0 THRUSH: Primary | ICD-10-CM

## 2024-09-09 PROCEDURE — 3017F COLORECTAL CA SCREEN DOC REV: CPT | Performed by: OTOLARYNGOLOGY

## 2024-09-09 PROCEDURE — G8420 CALC BMI NORM PARAMETERS: HCPCS | Performed by: OTOLARYNGOLOGY

## 2024-09-09 PROCEDURE — 4004F PT TOBACCO SCREEN RCVD TLK: CPT | Performed by: OTOLARYNGOLOGY

## 2024-09-09 PROCEDURE — 3077F SYST BP >= 140 MM HG: CPT | Performed by: OTOLARYNGOLOGY

## 2024-09-09 PROCEDURE — 99204 OFFICE O/P NEW MOD 45 MIN: CPT | Performed by: OTOLARYNGOLOGY

## 2024-09-09 PROCEDURE — 3078F DIAST BP <80 MM HG: CPT | Performed by: OTOLARYNGOLOGY

## 2024-09-09 PROCEDURE — 1090F PRES/ABSN URINE INCON ASSESS: CPT | Performed by: OTOLARYNGOLOGY

## 2024-09-09 PROCEDURE — 1123F ACP DISCUSS/DSCN MKR DOCD: CPT | Performed by: OTOLARYNGOLOGY

## 2024-09-09 PROCEDURE — G8427 DOCREV CUR MEDS BY ELIG CLIN: HCPCS | Performed by: OTOLARYNGOLOGY

## 2024-09-09 PROCEDURE — 31575 DIAGNOSTIC LARYNGOSCOPY: CPT | Performed by: OTOLARYNGOLOGY

## 2024-09-09 PROCEDURE — G8399 PT W/DXA RESULTS DOCUMENT: HCPCS | Performed by: OTOLARYNGOLOGY

## 2024-09-09 RX ORDER — PANTOPRAZOLE SODIUM 20 MG/1
20 TABLET, DELAYED RELEASE ORAL
Qty: 30 TABLET | Refills: 5 | Status: SHIPPED | OUTPATIENT
Start: 2024-09-09

## 2024-10-09 ENCOUNTER — TELEPHONE (OUTPATIENT)
Dept: PULMONOLOGY | Age: 64
End: 2024-10-09

## 2024-10-09 NOTE — TELEPHONE ENCOUNTER
We received a request to refill Jacquelyn'louisa Baca. She cancelled her appointment last week. I reached out to her to see if she was going to reschedule that appointment and come in.

## 2024-10-14 DIAGNOSIS — J44.9 CHRONIC OBSTRUCTIVE PULMONARY DISEASE, UNSPECIFIED COPD TYPE (HCC): ICD-10-CM

## 2024-10-15 RX ORDER — FLUTICASONE FUROATE, UMECLIDINIUM BROMIDE AND VILANTEROL TRIFENATATE 200; 62.5; 25 UG/1; UG/1; UG/1
1 POWDER RESPIRATORY (INHALATION) DAILY
Qty: 60 EACH | Refills: 3 | Status: SHIPPED | OUTPATIENT
Start: 2024-10-15 | End: 2024-10-16 | Stop reason: SDUPTHER

## 2024-10-16 ENCOUNTER — OFFICE VISIT (OUTPATIENT)
Dept: PULMONOLOGY | Age: 64
End: 2024-10-16
Payer: MEDICARE

## 2024-10-16 VITALS
SYSTOLIC BLOOD PRESSURE: 144 MMHG | HEIGHT: 66 IN | TEMPERATURE: 97 F | WEIGHT: 156 LBS | RESPIRATION RATE: 18 BRPM | DIASTOLIC BLOOD PRESSURE: 80 MMHG | OXYGEN SATURATION: 99 % | BODY MASS INDEX: 25.07 KG/M2 | HEART RATE: 92 BPM

## 2024-10-16 DIAGNOSIS — Z87.891 PERSONAL HISTORY OF TOBACCO USE: Primary | ICD-10-CM

## 2024-10-16 DIAGNOSIS — J44.9 CHRONIC OBSTRUCTIVE PULMONARY DISEASE, UNSPECIFIED COPD TYPE (HCC): ICD-10-CM

## 2024-10-16 PROCEDURE — 3079F DIAST BP 80-89 MM HG: CPT | Performed by: INTERNAL MEDICINE

## 2024-10-16 PROCEDURE — 1090F PRES/ABSN URINE INCON ASSESS: CPT | Performed by: INTERNAL MEDICINE

## 2024-10-16 PROCEDURE — 3017F COLORECTAL CA SCREEN DOC REV: CPT | Performed by: INTERNAL MEDICINE

## 2024-10-16 PROCEDURE — 3077F SYST BP >= 140 MM HG: CPT | Performed by: INTERNAL MEDICINE

## 2024-10-16 PROCEDURE — G8420 CALC BMI NORM PARAMETERS: HCPCS | Performed by: INTERNAL MEDICINE

## 2024-10-16 PROCEDURE — 3023F SPIROM DOC REV: CPT | Performed by: INTERNAL MEDICINE

## 2024-10-16 PROCEDURE — 99214 OFFICE O/P EST MOD 30 MIN: CPT | Performed by: INTERNAL MEDICINE

## 2024-10-16 PROCEDURE — G8427 DOCREV CUR MEDS BY ELIG CLIN: HCPCS | Performed by: INTERNAL MEDICINE

## 2024-10-16 PROCEDURE — G8484 FLU IMMUNIZE NO ADMIN: HCPCS | Performed by: INTERNAL MEDICINE

## 2024-10-16 PROCEDURE — 1123F ACP DISCUSS/DSCN MKR DOCD: CPT | Performed by: INTERNAL MEDICINE

## 2024-10-16 PROCEDURE — 4004F PT TOBACCO SCREEN RCVD TLK: CPT | Performed by: INTERNAL MEDICINE

## 2024-10-16 PROCEDURE — G8399 PT W/DXA RESULTS DOCUMENT: HCPCS | Performed by: INTERNAL MEDICINE

## 2024-10-16 RX ORDER — FLUTICASONE FUROATE, UMECLIDINIUM BROMIDE AND VILANTEROL TRIFENATATE 200; 62.5; 25 UG/1; UG/1; UG/1
1 POWDER RESPIRATORY (INHALATION) DAILY
Qty: 60 EACH | Refills: 3 | Status: SHIPPED | OUTPATIENT
Start: 2024-10-16

## 2024-10-16 ASSESSMENT — COPD QUESTIONNAIRES: COPD: 1

## 2024-10-16 NOTE — PROGRESS NOTES
[facility] PULMONARY, SLEEP, AND CRITICAL CARE    Kendra Sears (:  1960) is a 63 y.o. female,New patient, here for evaluation of the following chief complaint(s):  COPD      Assessment & Plan   ASSESSMENT/PLAN:  1. Personal history of tobacco use  Assessment & Plan:   - Still smoking  -Recent LDCT unremarkable, will be due 2025  2. Chronic obstructive pulmonary disease, unspecified COPD type (HCC)  Assessment & Plan:  PFTs with moderate COPD, FEV1 71%  -Continue Trelegy, Singulair  Orders:  -     fluticasone-umeclidin-vilant (TRELEGY ELLIPTA) 200-62.5-25 MCG/ACT AEPB inhaler; Inhale 1 puff into the lungs daily, Disp-60 each, R-3Normal            Return in about 6 months (around 2025).  Future Appointments   Date Time Provider Department Center   2024  2:00 PM [facility] ECHO 1 [facility]   2024  1:30 PM Toñito Kirkpatrick MD [facility]   2025  1:40 PM Yoan Mccray MD [facility]   2025  1:00 PM Alvarado Dasilva MD [facility]            Subjective   SUBJECTIVE/OBJECTIVE:  Follow-up smoker with centrilobular emphysema and possible asthma overlap  -Recent CT unremarkable  -Doing better on Trelegy, thought she had an episode of thrush but was seen by ENT and determined to be stomatitis from acid reflux      COPD        Review of Systems   Constitutional: Negative.    Cardiovascular: Negative.    Musculoskeletal: Negative.    Neurological: Negative.    Psychiatric/Behavioral: Negative.            Objective   Physical Exam     Gen:  No acute distress.   Eyes: PERRL. EOMI. Anicteric sclera. No conjunctival injection.   Resp:  No crackles. No wheezes. No rhonchi. No dullness on percussion.  CV: Regular rate. Regular rhythm. No murmur or rub. No edema.   Psych: Awake and alert, Oriented x 3.  Judgement and insight appropriate.  Mood stable.    I spent 21 minutes on this case today, >50% was face-to-face in discussion of the diagnosis and the coordination

## 2024-10-26 DIAGNOSIS — I10 ESSENTIAL HYPERTENSION: Primary | ICD-10-CM

## 2024-10-28 RX ORDER — LOSARTAN POTASSIUM 100 MG/1
TABLET ORAL
Qty: 90 TABLET | Refills: 3 | Status: SHIPPED | OUTPATIENT
Start: 2024-10-28

## 2024-10-28 RX ORDER — ATORVASTATIN CALCIUM 20 MG/1
TABLET, FILM COATED ORAL
Qty: 90 TABLET | Refills: 3 | Status: SHIPPED | OUTPATIENT
Start: 2024-10-28

## 2024-10-28 NOTE — TELEPHONE ENCOUNTER
Last OV: 4/19/24  Next OV: 12/20/24  Last refill: 9/21/23 #90 3 R/F  Most recent Labs: 5/14/24  Last EKG (if needed): 4/19/24

## 2024-11-04 ENCOUNTER — HOSPITAL ENCOUNTER (OUTPATIENT)
Age: 64
Discharge: HOME | End: 2024-11-06
Payer: MEDICARE

## 2024-11-04 VITALS
SYSTOLIC BLOOD PRESSURE: 171 MMHG | BODY MASS INDEX: 24.48 KG/M2 | WEIGHT: 156 LBS | DIASTOLIC BLOOD PRESSURE: 82 MMHG | HEIGHT: 67 IN

## 2024-11-04 DIAGNOSIS — I50.32 CHRONIC DIASTOLIC HEART FAILURE (HCC): ICD-10-CM

## 2024-11-04 DIAGNOSIS — I35.1 NON-RHEUMATIC AORTIC REGURGITATION: ICD-10-CM

## 2024-11-04 LAB
ECHO AO ASC DIAM: 2.5 CM
ECHO AO ASCENDING AORTA INDEX: 1.37 CM/M2
ECHO AR MAX VEL PISA: 4.4 M/S
ECHO AV AREA PEAK VELOCITY: 2.1 CM2
ECHO AV AREA VTI: 2.1 CM2
ECHO AV AREA/BSA PEAK VELOCITY: 1.2 CM2/M2
ECHO AV AREA/BSA VTI: 1.2 CM2/M2
ECHO AV MEAN GRADIENT: 4 MMHG
ECHO AV MEAN VELOCITY: 0.9 M/S
ECHO AV PEAK GRADIENT: 8 MMHG
ECHO AV PEAK VELOCITY: 1.4 M/S
ECHO AV REGURGITANT PHT: 403 MS
ECHO AV VELOCITY RATIO: 0.64
ECHO AV VTI: 27.4 CM
ECHO BSA: 1.83 M2
ECHO LA AREA 2C: 14.4 CM2
ECHO LA AREA 4C: 10.4 CM2
ECHO LA DIAMETER INDEX: 1.65 CM/M2
ECHO LA DIAMETER: 3 CM
ECHO LA MAJOR AXIS: 4.6 CM
ECHO LA MINOR AXIS: 4.5 CM
ECHO LA VOL BP: 28 ML (ref 22–52)
ECHO LA VOL MOD A2C: 39 ML (ref 22–52)
ECHO LA VOL MOD A4C: 20 ML (ref 22–52)
ECHO LA VOL/BSA BIPLANE: 15 ML/M2 (ref 16–34)
ECHO LA VOLUME INDEX MOD A2C: 21 ML/M2 (ref 16–34)
ECHO LA VOLUME INDEX MOD A4C: 11 ML/M2 (ref 16–34)
ECHO LV E' LATERAL VELOCITY: 5.4 CM/S
ECHO LV E' SEPTAL VELOCITY: 4.5 CM/S
ECHO LV EDV A2C: 53 ML
ECHO LV EDV A4C: 75 ML
ECHO LV EDV INDEX A4C: 41 ML/M2
ECHO LV EDV NDEX A2C: 29 ML/M2
ECHO LV EF PHYSICIAN: 58 %
ECHO LV EJECTION FRACTION A2C: 56 %
ECHO LV EJECTION FRACTION A4C: 55 %
ECHO LV ESV A2C: 23 ML
ECHO LV ESV A4C: 34 ML
ECHO LV ESV INDEX A2C: 13 ML/M2
ECHO LV ESV INDEX A4C: 19 ML/M2
ECHO LV FRACTIONAL SHORTENING: 40 % (ref 28–44)
ECHO LV GLOBAL LONGITUDINAL STRAIN (GLS): -11.1 %
ECHO LV INTERNAL DIMENSION DIASTOLE INDEX: 2.91 CM/M2
ECHO LV INTERNAL DIMENSION DIASTOLIC: 5.3 CM (ref 3.9–5.3)
ECHO LV INTERNAL DIMENSION SYSTOLIC INDEX: 1.76 CM/M2
ECHO LV INTERNAL DIMENSION SYSTOLIC: 3.2 CM
ECHO LV IVSD: 0.7 CM (ref 0.6–0.9)
ECHO LV MASS 2D: 162.1 G (ref 67–162)
ECHO LV MASS INDEX 2D: 89.1 G/M2 (ref 43–95)
ECHO LV POSTERIOR WALL DIASTOLIC: 1 CM (ref 0.6–0.9)
ECHO LV RELATIVE WALL THICKNESS RATIO: 0.38
ECHO LVOT AREA: 3.1 CM2
ECHO LVOT AV VTI INDEX: 0.65
ECHO LVOT DIAM: 2 CM
ECHO LVOT MEAN GRADIENT: 2 MMHG
ECHO LVOT PEAK GRADIENT: 3 MMHG
ECHO LVOT PEAK VELOCITY: 0.9 M/S
ECHO LVOT STROKE VOLUME INDEX: 30.9 ML/M2
ECHO LVOT SV: 56.2 ML
ECHO LVOT VTI: 17.9 CM
ECHO MV A VELOCITY: 0.82 M/S
ECHO MV E VELOCITY: 0.44 M/S
ECHO MV E/A RATIO: 0.54
ECHO MV E/E' LATERAL: 8.15
ECHO MV E/E' RATIO (AVERAGED): 8.96
ECHO MV E/E' SEPTAL: 9.78
ECHO PV MAX VELOCITY: 0.8 M/S
ECHO PV MEAN GRADIENT: 1 MMHG
ECHO PV MEAN VELOCITY: 0.5 M/S
ECHO PV PEAK GRADIENT: 2 MMHG
ECHO PV VTI: 17 CM
ECHO RV FREE WALL PEAK S': 11.7 CM/S
ECHO RV TAPSE: 2 CM (ref 1.7–?)

## 2024-11-04 PROCEDURE — 93306 TTE W/DOPPLER COMPLETE: CPT

## 2024-11-15 ENCOUNTER — TELEPHONE (OUTPATIENT)
Dept: CARDIOLOGY CLINIC | Age: 64
End: 2024-11-15

## 2024-11-15 NOTE — TELEPHONE ENCOUNTER
Patient called in stating she had an ECHO done 11/4.   She wants to know if she needs to have her Cardiac MRI done before her 12/20 follow up appt with Dr. Kirkpatrick.     Please advise.     Callback: [phone]

## 2024-11-18 NOTE — TELEPHONE ENCOUNTER
See 11/4/24 echo results.  Called spoke with patient gave echo results and states understanding.     I told patient she didn't need a cardiac MRI done. To keep follow up on 12/20/24 with Dr Kirkpatrick and can discuss further.

## 2024-12-19 NOTE — PROGRESS NOTES
Continue medical therapy.    5) Coronary artery calcification/aortic atherosclerosis/Hyperlipidemia/atypical chest pain. LDL 73 (10/12/23). Continue statin therapy with Lipitor 20 mg daily. Normal myocardial perfusion study 10/26/2023.     6) Nicotine Addiction. Encouraged smoking cessation but patient is trying to cut back.      7) Diabetes Type II. Management per PCP. Continue statin therapy.     8) Generalized fatigue. Etiology uncertain. Likely multifactorial. Lab work stable (5/2024). No significant changes on repeat echocardiogram. Advised to call with new/worsening symptoms.     Follow up after echocardiogram in 12/2025.     Thank you very much for allowing me to participate in the care of your patient. Please do not hesitate to contact me if you have any questions.    Sincerely,  Toñito Kirkpatrick MD    [facility]  [address], [address]   [place] [address]  Ph: [phone]    This note was scribed in the presence of Dr Casimiro MD by Ashly Mead RN.   Physician Attestation: The scribes documentation has been prepared under my direction and personally reviewed by me in its entirety.   I confirm that the note above accurately reflects all work, treatment, procedures, and medical decision making performed by me.   All portions of the note including but not limited to the chief complaint, history of present illness, physical exam, assessment and plan/medical decision making were personally reviewed, edited, and updated on the day of the visit.

## 2024-12-20 ENCOUNTER — OFFICE VISIT (OUTPATIENT)
Dept: CARDIOLOGY CLINIC | Age: 64
End: 2024-12-20
Payer: MEDICARE

## 2024-12-20 VITALS
WEIGHT: 159 LBS | SYSTOLIC BLOOD PRESSURE: 112 MMHG | HEIGHT: 67 IN | HEART RATE: 75 BPM | BODY MASS INDEX: 24.96 KG/M2 | OXYGEN SATURATION: 98 % | DIASTOLIC BLOOD PRESSURE: 60 MMHG

## 2024-12-20 DIAGNOSIS — I35.1 NONRHEUMATIC AORTIC VALVE INSUFFICIENCY: ICD-10-CM

## 2024-12-20 DIAGNOSIS — I10 ESSENTIAL HYPERTENSION: ICD-10-CM

## 2024-12-20 DIAGNOSIS — I50.32 CHRONIC DIASTOLIC HEART FAILURE (HCC): Primary | ICD-10-CM

## 2024-12-20 PROCEDURE — 3074F SYST BP LT 130 MM HG: CPT | Performed by: INTERNAL MEDICINE

## 2024-12-20 PROCEDURE — G8399 PT W/DXA RESULTS DOCUMENT: HCPCS | Performed by: INTERNAL MEDICINE

## 2024-12-20 PROCEDURE — 3017F COLORECTAL CA SCREEN DOC REV: CPT | Performed by: INTERNAL MEDICINE

## 2024-12-20 PROCEDURE — G8420 CALC BMI NORM PARAMETERS: HCPCS | Performed by: INTERNAL MEDICINE

## 2024-12-20 PROCEDURE — 99214 OFFICE O/P EST MOD 30 MIN: CPT | Performed by: INTERNAL MEDICINE

## 2024-12-20 PROCEDURE — 3078F DIAST BP <80 MM HG: CPT | Performed by: INTERNAL MEDICINE

## 2024-12-20 PROCEDURE — 1090F PRES/ABSN URINE INCON ASSESS: CPT | Performed by: INTERNAL MEDICINE

## 2024-12-20 PROCEDURE — G8484 FLU IMMUNIZE NO ADMIN: HCPCS | Performed by: INTERNAL MEDICINE

## 2024-12-20 PROCEDURE — G8427 DOCREV CUR MEDS BY ELIG CLIN: HCPCS | Performed by: INTERNAL MEDICINE

## 2024-12-20 PROCEDURE — 1123F ACP DISCUSS/DSCN MKR DOCD: CPT | Performed by: INTERNAL MEDICINE

## 2024-12-20 PROCEDURE — 4004F PT TOBACCO SCREEN RCVD TLK: CPT | Performed by: INTERNAL MEDICINE

## 2024-12-20 PROCEDURE — 1159F MED LIST DOCD IN RCRD: CPT | Performed by: INTERNAL MEDICINE

## 2025-01-05 RX ORDER — SULINDAC 200 MG/1
TABLET ORAL
Qty: 180 TABLET | Refills: 3 | Status: SHIPPED | OUTPATIENT
Start: 2025-01-05

## 2025-01-05 RX ORDER — MONTELUKAST SODIUM 10 MG/1
TABLET ORAL
Qty: 90 TABLET | Refills: 3 | Status: SHIPPED | OUTPATIENT
Start: 2025-01-05

## 2025-02-03 RX ORDER — CYCLOBENZAPRINE HCL 10 MG
TABLET ORAL
Qty: 180 TABLET | Refills: 1 | Status: SHIPPED | OUTPATIENT
Start: 2025-02-03

## 2025-02-03 SDOH — ECONOMIC STABILITY: INCOME INSECURITY: IN THE LAST 12 MONTHS, WAS THERE A TIME WHEN YOU WERE NOT ABLE TO PAY THE MORTGAGE OR RENT ON TIME?: NO

## 2025-02-03 SDOH — ECONOMIC STABILITY: FOOD INSECURITY: WITHIN THE PAST 12 MONTHS, YOU WORRIED THAT YOUR FOOD WOULD RUN OUT BEFORE YOU GOT MONEY TO BUY MORE.: NEVER TRUE

## 2025-02-03 SDOH — ECONOMIC STABILITY: FOOD INSECURITY: WITHIN THE PAST 12 MONTHS, THE FOOD YOU BOUGHT JUST DIDN'T LAST AND YOU DIDN'T HAVE MONEY TO GET MORE.: NEVER TRUE

## 2025-02-03 SDOH — HEALTH STABILITY: PHYSICAL HEALTH: ON AVERAGE, HOW MANY DAYS PER WEEK DO YOU ENGAGE IN MODERATE TO STRENUOUS EXERCISE (LIKE A BRISK WALK)?: 1 DAY

## 2025-02-03 SDOH — HEALTH STABILITY: PHYSICAL HEALTH: ON AVERAGE, HOW MANY MINUTES DO YOU ENGAGE IN EXERCISE AT THIS LEVEL?: 10 MIN

## 2025-02-03 ASSESSMENT — PATIENT HEALTH QUESTIONNAIRE - PHQ9
SUM OF ALL RESPONSES TO PHQ QUESTIONS 1-9: 0
1. LITTLE INTEREST OR PLEASURE IN DOING THINGS: NOT AT ALL
2. FEELING DOWN, DEPRESSED OR HOPELESS: NOT AT ALL
SUM OF ALL RESPONSES TO PHQ9 QUESTIONS 1 & 2: 0
SUM OF ALL RESPONSES TO PHQ QUESTIONS 1-9: 0

## 2025-02-03 ASSESSMENT — LIFESTYLE VARIABLES
HOW MANY STANDARD DRINKS CONTAINING ALCOHOL DO YOU HAVE ON A TYPICAL DAY: 0
HOW MANY STANDARD DRINKS CONTAINING ALCOHOL DO YOU HAVE ON A TYPICAL DAY: PATIENT DOES NOT DRINK
HOW OFTEN DO YOU HAVE A DRINK CONTAINING ALCOHOL: 2
HOW OFTEN DO YOU HAVE A DRINK CONTAINING ALCOHOL: MONTHLY OR LESS
HOW OFTEN DO YOU HAVE SIX OR MORE DRINKS ON ONE OCCASION: 1

## 2025-02-06 ENCOUNTER — OFFICE VISIT (OUTPATIENT)
Dept: FAMILY MEDICINE CLINIC | Age: 65
End: 2025-02-06

## 2025-02-06 VITALS
HEART RATE: 80 BPM | BODY MASS INDEX: 24.92 KG/M2 | DIASTOLIC BLOOD PRESSURE: 84 MMHG | TEMPERATURE: 97.2 F | HEIGHT: 67 IN | WEIGHT: 158.8 LBS | SYSTOLIC BLOOD PRESSURE: 138 MMHG

## 2025-02-06 DIAGNOSIS — Z00.00 MEDICARE ANNUAL WELLNESS VISIT, SUBSEQUENT: Primary | ICD-10-CM

## 2025-02-06 DIAGNOSIS — I10 ESSENTIAL HYPERTENSION: ICD-10-CM

## 2025-02-06 DIAGNOSIS — I50.32 CHRONIC DIASTOLIC HEART FAILURE (HCC): ICD-10-CM

## 2025-02-06 DIAGNOSIS — E11.9 TYPE 2 DIABETES MELLITUS WITHOUT COMPLICATION, WITHOUT LONG-TERM CURRENT USE OF INSULIN (HCC): ICD-10-CM

## 2025-02-06 RX ORDER — PREDNISONE 10 MG/1
TABLET ORAL
COMMUNITY
Start: 2025-02-05

## 2025-02-06 ASSESSMENT — ENCOUNTER SYMPTOMS
ABDOMINAL PAIN: 0
DIARRHEA: 0
TROUBLE SWALLOWING: 0
SHORTNESS OF BREATH: 0
CONSTIPATION: 0
SORE THROAT: 0
BLOOD IN STOOL: 0
VOICE CHANGE: 0

## 2025-02-06 ASSESSMENT — PATIENT HEALTH QUESTIONNAIRE - PHQ9
3. TROUBLE FALLING OR STAYING ASLEEP: NOT AT ALL
8. MOVING OR SPEAKING SO SLOWLY THAT OTHER PEOPLE COULD HAVE NOTICED. OR THE OPPOSITE, BEING SO FIGETY OR RESTLESS THAT YOU HAVE BEEN MOVING AROUND A LOT MORE THAN USUAL: NOT AT ALL
10. IF YOU CHECKED OFF ANY PROBLEMS, HOW DIFFICULT HAVE THESE PROBLEMS MADE IT FOR YOU TO DO YOUR WORK, TAKE CARE OF THINGS AT HOME, OR GET ALONG WITH OTHER PEOPLE: NOT DIFFICULT AT ALL
7. TROUBLE CONCENTRATING ON THINGS, SUCH AS READING THE NEWSPAPER OR WATCHING TELEVISION: NOT AT ALL
SUM OF ALL RESPONSES TO PHQ QUESTIONS 1-9: 0
SUM OF ALL RESPONSES TO PHQ9 QUESTIONS 1 & 2: 0
2. FEELING DOWN, DEPRESSED OR HOPELESS: NOT AT ALL
9. THOUGHTS THAT YOU WOULD BE BETTER OFF DEAD, OR OF HURTING YOURSELF: NOT AT ALL
SUM OF ALL RESPONSES TO PHQ QUESTIONS 1-9: 0
6. FEELING BAD ABOUT YOURSELF - OR THAT YOU ARE A FAILURE OR HAVE LET YOURSELF OR YOUR FAMILY DOWN: NOT AT ALL
4. FEELING TIRED OR HAVING LITTLE ENERGY: NOT AT ALL
SUM OF ALL RESPONSES TO PHQ QUESTIONS 1-9: 0
1. LITTLE INTEREST OR PLEASURE IN DOING THINGS: NOT AT ALL
SUM OF ALL RESPONSES TO PHQ QUESTIONS 1-9: 0
5. POOR APPETITE OR OVEREATING: NOT AT ALL

## 2025-02-06 NOTE — PROGRESS NOTES
Medicare Annual Wellness Visit    Kendra Sears is here for Check-Up (Diabetes, lipids) and Medicare AWV    Assessment & Plan   Essential hypertension  -     Comprehensive Metabolic Panel; Future  -     Lipid Panel; Future  Type 2 diabetes mellitus without complication, without long-term current use of insulin (HCC)  -     Comprehensive Metabolic Panel; Future  -     Lipid Panel; Future  -     Hemoglobin A1C; Future  Chronic diastolic heart failure (HCC)  -     Comprehensive Metabolic Panel; Future  Medicare annual wellness visit, subsequent       Return in about 6 months (around 8/6/2025).     Subjective   Medicare AWV    Patient's complete Health Risk Assessment and screening values have been reviewed and are found in Flowsheets. The following problems were reviewed today and where indicated follow up appointments were made and/or referrals ordered.    Positive Risk Factor Screenings with Interventions:             General HRA Questions:  Select all that apply:  (Patient has complaints of chronic lower back pain with exacerbations.)  Interventions - Pain:  See AVS for additional education material      Inactivity:  On average, how many days per week do you engage in moderate to strenuous exercise (like a brisk walk)?: 1 day (!) Abnormal  On average, how many minutes do you engage in exercise at this level?: 10 min  Interventions:  See AVS for additional education material            Tobacco Use:    Tobacco Use      Smoking status: Every Day        Packs/day: 0.50        Years: 0.5 packs/day for 49.9 years (25.0 ttl pk-yrs)        Types: Cigarettes        Start date: 3/14/1975      Smokeless tobacco: Never      Tobacco comments: 6 cig daily, cutting back, hx 0.75 ppd     Interventions:  See AVS for additional education material                      Objective   Vitals:    02/06/25 1343   BP: 138/84   Site: Left Upper Arm   Position: Sitting   Cuff Size: Medium Adult   Pulse: 80   Temp: 97.2 °F (36.2 °C)

## 2025-02-06 NOTE — PROGRESS NOTES
Subjective:      Patient ID: Kendra Sears is a 64 y.o. female.    Chief Complaint   Patient presents with    Check-Up     Diabetes, lipids        Patient presents with:  Check-Up: Diabetes, lipids    Sagar for the above   She is well     She has no c/o    Meds same     YOB: 1960    Date of Visit:  2/6/2025     -- Azithromycin -- Itching, Rash, Other (See Comments)                           and Swelling    --  Other reaction(s): Other (See Comments)             FEVER             FEVER   -- Baclofen -- Nausea And Vomiting, Itching and                            Rash   -- Nuts [Peanut Oil] -- Anaphylaxis   -- Peanut-Containing Drug Products -- Anaphylaxis   -- Sulfa Antibiotics -- Anaphylaxis and Rash   -- Adalimumab -- Other (See Comments)    --  \"immune system crashes\"   -- Avelox [Moxifloxacin Hcl In Nacl]    -- Bactrim    -- Belbuca [Buprenorphine Hcl] -- Itching and Other (See Comments)    --  Headaches, insomina   -- Infliximab -- Other (See Comments)    --  \"immune system crashes\"   -- Lisinopril -- Other (See Comments)    --  Cough   -- Zithromax [Azithromycin Dihydrate]    -- Adhesive Tape -- Rash    --  PAPER TAPE IS OK             PAPER TAPE IS OK   -- Moxifloxacin -- Rash, Other (See Comments) and                            Swelling    --  Other reaction(s): Other (See Comments)             FEVER             FEVER             Other reaction(s): Fever   -- Sulfamethoxazole-Trimethoprim -- Other (See Comments), Rash and                            Swelling    --  Other reaction(s): Other (See Comments)             FEVER             FEVER    Current Outpatient Medications:  predniSONE (DELTASONE) 10 MG tablet, , Disp: , Rfl:   cyclobenzaprine (FLEXERIL) 10 MG tablet, TAKE 1 TABLET TWICE DAILY AS NEEDED FOR MUSCLE SPASMS, Disp: 180 tablet, Rfl: 1  montelukast (SINGULAIR) 10 MG tablet, TAKE 1 TABLET EVERY DAY, Disp: 90 tablet, Rfl: 3  sulindac (CLINORIL) 200 MG tablet, TAKE 1 TABLET TWICE

## 2025-02-06 NOTE — PATIENT INSTRUCTIONS
Continue the diet and the current medicines  Remember to do the ct lung in July   See back in 6 months          Learning About Being Active as an Older Adult  Why is being active important as you get older?     Being active is one of the best things you can do for your health. And it's never too late to start. Being active--or getting active, if you aren't already--has definite benefits. It can:  Give you more energy,  Keep your mind sharp.  Improve balance to reduce your risk of falls.  Help you manage chronic illness with fewer medicines.  No matter how old you are, how fit you are, or what health problems you have, there is a form of activity that will work for you. And the more physical activity you can do, the better your overall health will be.  What kinds of activity can help you stay healthy?  Being more active will make your daily activities easier. Physical activity includes planned exercise and things you do in daily life. There are four types of activity:  Aerobic.  Doing aerobic activity makes your heart and lungs strong.  Includes walking, dancing, and gardening.  Aim for at least 2½ hours spread throughout the week.  It improves your energy and can help you sleep better.  Muscle-strengthening.  This type of activity can help maintain muscle and strengthen bones.  Includes climbing stairs, using resistance bands, and lifting or carrying heavy loads.  Aim for at least twice a week.  It can help protect the knees and other joints.  Stretching.  Stretching gives you better range of motion in joints and muscles.  Includes upper arm stretches, calf stretches, and gentle yoga.  Aim for at least twice a week, preferably after your muscles are warmed up from other activities.  It can help you function better in daily life.  Balancing.  This helps you stay coordinated and have good posture.  Includes heel-to-toe walking, shana chi, and certain types of yoga.  Aim for at least 3 days a week.  It can reduce your

## 2025-02-07 ENCOUNTER — TRANSCRIBE ORDERS (OUTPATIENT)
Dept: ADMINISTRATIVE | Age: 65
End: 2025-02-07

## 2025-02-07 DIAGNOSIS — M54.12 RADICULOPATHY, CERVICAL REGION: Primary | ICD-10-CM

## 2025-02-10 DIAGNOSIS — E11.9 TYPE 2 DIABETES MELLITUS WITHOUT COMPLICATION, WITHOUT LONG-TERM CURRENT USE OF INSULIN (HCC): ICD-10-CM

## 2025-02-10 DIAGNOSIS — I10 ESSENTIAL HYPERTENSION: ICD-10-CM

## 2025-02-10 DIAGNOSIS — I50.32 CHRONIC DIASTOLIC HEART FAILURE (HCC): ICD-10-CM

## 2025-02-10 LAB
ALBUMIN SERPL-MCNC: 4.5 G/DL (ref 3.4–5)
ALBUMIN/GLOB SERPL: 1.7 {RATIO} (ref 1.1–2.2)
ALP SERPL-CCNC: 169 U/L (ref 40–129)
ALT SERPL-CCNC: 29 U/L (ref 10–40)
ANION GAP SERPL CALCULATED.3IONS-SCNC: 14 MMOL/L (ref 3–16)
AST SERPL-CCNC: 30 U/L (ref 15–37)
BILIRUB SERPL-MCNC: 0.7 MG/DL (ref 0–1)
BUN SERPL-MCNC: 16 MG/DL (ref 7–20)
CALCIUM SERPL-MCNC: 9.7 MG/DL (ref 8.3–10.6)
CHLORIDE SERPL-SCNC: 102 MMOL/L (ref 99–110)
CHOLEST SERPL-MCNC: 168 MG/DL (ref 0–199)
CO2 SERPL-SCNC: 24 MMOL/L (ref 21–32)
CREAT SERPL-MCNC: 1 MG/DL (ref 0.6–1.2)
EST. AVERAGE GLUCOSE BLD GHB EST-MCNC: 159.9 MG/DL
GFR SERPLBLD CREATININE-BSD FMLA CKD-EPI: 62 ML/MIN/{1.73_M2}
GLUCOSE SERPL-MCNC: 163 MG/DL (ref 70–99)
HBA1C MFR BLD: 7.2 %
HDLC SERPL-MCNC: 75 MG/DL (ref 40–60)
LDLC SERPL CALC-MCNC: 62 MG/DL
POTASSIUM SERPL-SCNC: 4.1 MMOL/L (ref 3.5–5.1)
PROT SERPL-MCNC: 7.1 G/DL (ref 6.4–8.2)
SODIUM SERPL-SCNC: 140 MMOL/L (ref 136–145)
TRIGL SERPL-MCNC: 157 MG/DL (ref 0–150)
VLDLC SERPL CALC-MCNC: 31 MG/DL

## 2025-02-11 DIAGNOSIS — M06.9 RHEUMATOID ARTHRITIS, INVOLVING UNSPECIFIED SITE, UNSPECIFIED WHETHER RHEUMATOID FACTOR PRESENT (HCC): ICD-10-CM

## 2025-02-11 DIAGNOSIS — R74.8 ELEVATED ALKALINE PHOSPHATASE LEVEL: Primary | ICD-10-CM

## 2025-02-13 ENCOUNTER — OFFICE VISIT (OUTPATIENT)
Dept: FAMILY MEDICINE CLINIC | Age: 65
End: 2025-02-13

## 2025-02-13 VITALS
BODY MASS INDEX: 24.96 KG/M2 | DIASTOLIC BLOOD PRESSURE: 82 MMHG | HEIGHT: 67 IN | WEIGHT: 159 LBS | SYSTOLIC BLOOD PRESSURE: 130 MMHG | TEMPERATURE: 97.2 F

## 2025-02-13 DIAGNOSIS — N39.0 URINARY TRACT INFECTION WITHOUT HEMATURIA, SITE UNSPECIFIED: Primary | ICD-10-CM

## 2025-02-13 LAB
BILIRUBIN, POC: NORMAL
BLOOD URINE, POC: NORMAL
CLARITY, POC: CLEAR
COLOR, POC: YELLOW
GLUCOSE URINE, POC: 250 MG/DL
KETONES, POC: NORMAL MG/DL
LEUKOCYTE EST, POC: NORMAL
NITRITE, POC: NORMAL
PH, POC: 6.5
PROTEIN, POC: NORMAL MG/DL
SPECIFIC GRAVITY, POC: 1.02
UROBILINOGEN, POC: 0.2 MG/DL

## 2025-02-13 RX ORDER — NITROFURANTOIN 25; 75 MG/1; MG/1
100 CAPSULE ORAL 2 TIMES DAILY
Qty: 14 CAPSULE | Refills: 0 | Status: SHIPPED | OUTPATIENT
Start: 2025-02-13 | End: 2025-02-20

## 2025-02-13 NOTE — PROGRESS NOTES
Subjective:      Patient ID: Kendra Sears is a 64 y.o. female.    Chief Complaint   Patient presents with    Urinary Urgency     Urinating every half hour to an hour, no other symptoms          Patient presents with:  Urinary Urgency: Urinating every half hour to an hour, no other symptoms      Frequency and urgency some leaking on self  Started Sunday   No abd pain no fever no blood    YOB: 1960    Date of Visit:  2/13/2025     -- Azithromycin -- Itching, Rash, Other (See Comments)                           and Swelling    --  Other reaction(s): Other (See Comments)             FEVER             FEVER   -- Baclofen -- Nausea And Vomiting, Itching and                            Rash   -- Nuts [Peanut Oil] -- Anaphylaxis   -- Peanut-Containing Drug Products -- Anaphylaxis   -- Sulfa Antibiotics -- Anaphylaxis and Rash   -- Adalimumab -- Other (See Comments)    --  \"immune system crashes\"   -- Avelox [Moxifloxacin Hcl In Nacl]    -- Bactrim    -- Belbuca [Buprenorphine Hcl] -- Itching and Other (See Comments)    --  Headaches, insomina   -- Infliximab -- Other (See Comments)    --  \"immune system crashes\"   -- Lisinopril -- Other (See Comments)    --  Cough   -- Zithromax [Azithromycin Dihydrate]    -- Adhesive Tape -- Rash    --  PAPER TAPE IS OK             PAPER TAPE IS OK   -- Moxifloxacin -- Rash, Other (See Comments) and                            Swelling    --  Other reaction(s): Other (See Comments)             FEVER             FEVER             Other reaction(s): Fever   -- Sulfamethoxazole-Trimethoprim -- Other (See Comments), Rash and                            Swelling    --  Other reaction(s): Other (See Comments)             FEVER             FEVER    Current Outpatient Medications:  predniSONE (DELTASONE) 10 MG tablet, , Disp: , Rfl:   cyclobenzaprine (FLEXERIL) 10 MG tablet, TAKE 1 TABLET TWICE DAILY AS NEEDED FOR MUSCLE SPASMS, Disp: 180 tablet, Rfl: 1  montelukast (SINGULAIR)

## 2025-03-10 DIAGNOSIS — R74.8 ELEVATED ALKALINE PHOSPHATASE LEVEL: ICD-10-CM

## 2025-03-10 DIAGNOSIS — M06.9 RHEUMATOID ARTHRITIS, INVOLVING UNSPECIFIED SITE, UNSPECIFIED WHETHER RHEUMATOID FACTOR PRESENT (HCC): ICD-10-CM

## 2025-03-11 ENCOUNTER — RESULTS FOLLOW-UP (OUTPATIENT)
Dept: FAMILY MEDICINE CLINIC | Age: 65
End: 2025-03-11

## 2025-03-11 ENCOUNTER — TELEPHONE (OUTPATIENT)
Dept: FAMILY MEDICINE CLINIC | Age: 65
End: 2025-03-11

## 2025-03-11 DIAGNOSIS — K74.69 OTHER CIRRHOSIS OF LIVER (HCC): ICD-10-CM

## 2025-03-11 DIAGNOSIS — K76.0 FATTY LIVER: Primary | ICD-10-CM

## 2025-03-11 DIAGNOSIS — M06.9 RHEUMATOID ARTHRITIS, INVOLVING UNSPECIFIED SITE, UNSPECIFIED WHETHER RHEUMATOID FACTOR PRESENT (HCC): ICD-10-CM

## 2025-03-11 LAB
25(OH)D3 SERPL-MCNC: 8 NG/ML
ALBUMIN SERPL-MCNC: 4.2 G/DL (ref 3.4–5)
ALP SERPL-CCNC: 198 U/L (ref 40–129)
ALT SERPL-CCNC: 25 U/L (ref 10–40)
AST SERPL-CCNC: 26 U/L (ref 15–37)
BASOPHILS # BLD: 0.1 K/UL (ref 0–0.2)
BASOPHILS NFR BLD: 1.3 %
BILIRUB DIRECT SERPL-MCNC: 0.3 MG/DL (ref 0–0.3)
BILIRUB INDIRECT SERPL-MCNC: 0.2 MG/DL (ref 0–1)
BILIRUB SERPL-MCNC: 0.5 MG/DL (ref 0–1)
DEPRECATED RDW RBC AUTO: 14 % (ref 12.4–15.4)
EOSINOPHIL # BLD: 0.1 K/UL (ref 0–0.6)
EOSINOPHIL NFR BLD: 0.8 %
GGT SERPL-CCNC: 58 U/L (ref 5–36)
HCT VFR BLD AUTO: 44.6 % (ref 36–48)
HGB BLD-MCNC: 15.3 G/DL (ref 12–16)
LYMPHOCYTES # BLD: 2.6 K/UL (ref 1–5.1)
LYMPHOCYTES NFR BLD: 37 %
MCH RBC QN AUTO: 33.7 PG (ref 26–34)
MCHC RBC AUTO-ENTMCNC: 34.3 G/DL (ref 31–36)
MCV RBC AUTO: 98.3 FL (ref 80–100)
MONOCYTES # BLD: 0.5 K/UL (ref 0–1.3)
MONOCYTES NFR BLD: 7 %
NEUTROPHILS # BLD: 3.7 K/UL (ref 1.7–7.7)
NEUTROPHILS NFR BLD: 53.9 %
PLATELET # BLD AUTO: 201 K/UL (ref 135–450)
PMV BLD AUTO: 8.8 FL (ref 5–10.5)
PROT SERPL-MCNC: 6.4 G/DL (ref 6.4–8.2)
RBC # BLD AUTO: 4.54 M/UL (ref 4–5.2)
WBC # BLD AUTO: 6.9 K/UL (ref 4–11)

## 2025-03-11 RX ORDER — ERGOCALCIFEROL 1.25 MG/1
50000 CAPSULE, LIQUID FILLED ORAL WEEKLY
Qty: 12 CAPSULE | Refills: 1 | Status: SHIPPED | OUTPATIENT
Start: 2025-03-11

## 2025-03-11 NOTE — TELEPHONE ENCOUNTER
She does not take vit d  Will send a Rx to [facility] pharmacy and use weekly d  Dr leslie told her cirrhosis in the past when she had seen him   I looked at ct though on 8/2023 and no scarring seen just fatty changes     Will have her see See dr weston  for a recheck and assessment    It appears she has seen also dr weston as well and liver blood testing done on 7/2018   I also reviewed his note as well from 3/2019 and he notes concern for cirrhosis as well     She last seen dr rangel in 2023 for the RA   Per patient:  Tried plaquenil etc in past and mtx also  Consideration for biologics but not used   Was told not to do anything other than clinoril which does control her sx     I would recommend a return for recheck and assessment   Dr rangel       Needs referral to dr Weston Gi  Back to dr rangel  Call patient with details          Diagnosis Orders   1. Fatty liver        2. Other cirrhosis of liver (HCC)        3. Rheumatoid arthritis, involving unspecified site, unspecified whether rheumatoid factor present (HCC)              Orders Placed This Encounter   Medications    vitamin D (ERGOCALCIFEROL) 1.25 MG (28852 UT) CAPS capsule     Sig: Take 1 capsule by mouth once a week     Dispense:  12 capsule     Refill:  1

## 2025-03-13 LAB
ALP BONE SERPL-CCNC: 98 U/L (ref 0–55)
ALP ISOS SERPL HS-CCNC: 0 U/L
ALP LIVER SERPL-CCNC: 120 U/L (ref 0–94)
ALP SERPL-CCNC: 218 U/L (ref 40–120)

## 2025-04-17 ENCOUNTER — OFFICE VISIT (OUTPATIENT)
Dept: PULMONOLOGY | Age: 65
End: 2025-04-17
Payer: MEDICARE

## 2025-04-17 VITALS
RESPIRATION RATE: 18 BRPM | HEART RATE: 76 BPM | SYSTOLIC BLOOD PRESSURE: 136 MMHG | TEMPERATURE: 97.2 F | OXYGEN SATURATION: 96 % | DIASTOLIC BLOOD PRESSURE: 80 MMHG | BODY MASS INDEX: 24.99 KG/M2 | WEIGHT: 159.2 LBS | HEIGHT: 67 IN

## 2025-04-17 DIAGNOSIS — Z87.891 PERSONAL HISTORY OF TOBACCO USE: Primary | ICD-10-CM

## 2025-04-17 DIAGNOSIS — J45.21 INTERMITTENT ASTHMA WITH ACUTE EXACERBATION, UNSPECIFIED ASTHMA SEVERITY: ICD-10-CM

## 2025-04-17 DIAGNOSIS — J44.9 CHRONIC OBSTRUCTIVE PULMONARY DISEASE, UNSPECIFIED COPD TYPE (HCC): ICD-10-CM

## 2025-04-17 PROCEDURE — G0296 VISIT TO DETERM LDCT ELIG: HCPCS | Performed by: INTERNAL MEDICINE

## 2025-04-17 PROCEDURE — 1123F ACP DISCUSS/DSCN MKR DOCD: CPT | Performed by: INTERNAL MEDICINE

## 2025-04-17 PROCEDURE — 3075F SYST BP GE 130 - 139MM HG: CPT | Performed by: INTERNAL MEDICINE

## 2025-04-17 PROCEDURE — 3079F DIAST BP 80-89 MM HG: CPT | Performed by: INTERNAL MEDICINE

## 2025-04-17 PROCEDURE — 1090F PRES/ABSN URINE INCON ASSESS: CPT | Performed by: INTERNAL MEDICINE

## 2025-04-17 PROCEDURE — 99214 OFFICE O/P EST MOD 30 MIN: CPT | Performed by: INTERNAL MEDICINE

## 2025-04-17 PROCEDURE — 1159F MED LIST DOCD IN RCRD: CPT | Performed by: INTERNAL MEDICINE

## 2025-04-17 PROCEDURE — 3017F COLORECTAL CA SCREEN DOC REV: CPT | Performed by: INTERNAL MEDICINE

## 2025-04-17 PROCEDURE — G2211 COMPLEX E/M VISIT ADD ON: HCPCS | Performed by: INTERNAL MEDICINE

## 2025-04-17 PROCEDURE — G8420 CALC BMI NORM PARAMETERS: HCPCS | Performed by: INTERNAL MEDICINE

## 2025-04-17 PROCEDURE — 4004F PT TOBACCO SCREEN RCVD TLK: CPT | Performed by: INTERNAL MEDICINE

## 2025-04-17 PROCEDURE — G8427 DOCREV CUR MEDS BY ELIG CLIN: HCPCS | Performed by: INTERNAL MEDICINE

## 2025-04-17 PROCEDURE — G8399 PT W/DXA RESULTS DOCUMENT: HCPCS | Performed by: INTERNAL MEDICINE

## 2025-04-17 PROCEDURE — 3023F SPIROM DOC REV: CPT | Performed by: INTERNAL MEDICINE

## 2025-04-17 RX ORDER — ALBUTEROL SULFATE 0.83 MG/ML
SOLUTION RESPIRATORY (INHALATION)
Qty: 120 EACH | Refills: 0 | Status: SHIPPED | OUTPATIENT
Start: 2025-04-17

## 2025-04-17 ASSESSMENT — COPD QUESTIONNAIRES: COPD: 1

## 2025-04-17 NOTE — PROGRESS NOTES
[facility] PULMONARY, SLEEP, AND CRITICAL CARE    Kendra Sears (:  1960) is a 64 y.o. female,New patient, here for evaluation of the following chief complaint(s):  COPD      Assessment & Plan   ASSESSMENT/PLAN:  1. Personal history of tobacco use  Assessment & Plan:   - Still smoking  - LDCT due in July see discussion as below  Orders:  -     [place] VISIT TO DISCUSS LUNG CA SCREEN W LDCT  -     CT Lung Screen (Initial/Annual/Baseline); Future  2. Intermittent asthma with acute exacerbation, unspecified asthma severity  -     albuterol (PROVENTIL) (2.5 MG/3ML) 0.083% nebulizer solution; Use one vial every 6 hours in nebulizer as needed, Disp-120 each, R-0Normal  3. Chronic obstructive pulmonary disease, unspecified COPD type (HCC)  Assessment & Plan:  PFTs with moderate COPD, FEV1 71%  -Possible asthma overlap  -Continue Trelegy, Singulair  -Provide flutter valve device, can use albuterol nebulizers to help with airway clearance              Return in about 4 months (around 2025).  Future Appointments   Date Time Provider Department Center   2025  1:00 PM Komal Duran MD [facility] DEP   2025  1:00 PM Alvarado Dasilva MD  PULM [facility]   11/10/2025  2:00 [facility]   2025  1:15 PM Toñito Kirkpatrick MD [facility]            Subjective   SUBJECTIVE/OBJECTIVE:  Follow-up smoker with centrilobular emphysema and possible asthma overlap  - Due for LDCT in July  -Doing better on Trelegy, still some increased mucus production.  - Smoking 2 cigarettes a day      COPD        Review of Systems   Constitutional: Negative.    Cardiovascular: Negative.    Musculoskeletal: Negative.    Neurological: Negative.    Psychiatric/Behavioral: Negative.            Objective   Physical Exam     Gen:  No acute distress.   Eyes: PERRL. EOMI. Anicteric sclera. No conjunctival injection.   Resp:  No crackles. No wheezes. No rhonchi. No dullness on percussion.  CV:

## 2025-04-17 NOTE — ASSESSMENT & PLAN NOTE
PFTs with moderate COPD, FEV1 71%  -Possible asthma overlap  -Continue Trelegy, Singulair  -Provide flutter valve device, can use albuterol nebulizers to help with airway clearance

## 2025-04-18 ENCOUNTER — TELEPHONE (OUTPATIENT)
Dept: FAMILY MEDICINE CLINIC | Age: 65
End: 2025-04-18

## 2025-04-18 RX ORDER — GLIPIZIDE 5 MG/1
5 TABLET ORAL
Qty: 30 TABLET | Refills: 2 | Status: SHIPPED | OUTPATIENT
Start: 2025-04-18

## 2025-04-18 RX ORDER — BLOOD SUGAR DIAGNOSTIC
STRIP MISCELLANEOUS
Qty: 100 STRIP | Refills: 3 | Status: SHIPPED | OUTPATIENT
Start: 2025-04-18

## 2025-04-18 NOTE — TELEPHONE ENCOUNTER
Med sent     Orders Placed This Encounter   Medications    glipiZIDE (GLUCOTROL) 5 MG tablet     Sig: Take 1 tablet by mouth every morning (before breakfast)     Dispense:  30 tablet     Refill:  2

## 2025-04-18 NOTE — TELEPHONE ENCOUNTER
Pt informed of new medication would like sent into .     Pharmacy:    [id number] - [place], [place] - [address] [facility] [address] [facility] [address] - P [phone] - F [phone]  [facility], [facility] [address]  Phone: [phone]  Fax: [phone]

## 2025-04-18 NOTE — TELEPHONE ENCOUNTER
Pt is calling , states that her blood sugar is running 200 now but states that two days ago is was 400. She is currently taking 500 MG of Metformin  in morning and 500 MG of Metformin when she goes to bed . She mentioned that she is urinating a lot, blurred vision and shaky and would like to know what she should do ?

## 2025-04-18 NOTE — TELEPHONE ENCOUNTER
I will send in medicine to use every am with breakfast  Ok to keep the metformin as she is doing. She tells me higher doses cause gi upset     What pharmacy?  I am sending in glipizide

## 2025-04-21 ENCOUNTER — TELEPHONE (OUTPATIENT)
Dept: FAMILY MEDICINE CLINIC | Age: 65
End: 2025-04-21

## 2025-04-21 RX ORDER — CALCIUM CITRATE/VITAMIN D3 200MG-6.25
1 TABLET ORAL DAILY
Qty: 100 EACH | Refills: 3 | Status: SHIPPED | OUTPATIENT
Start: 2025-04-21

## 2025-04-21 NOTE — TELEPHONE ENCOUNTER
Spoke with Jacquelyn - she states she picked up a [employer] Brand Meter - True Metrix Meter.  She is asking for the strips for that meter.

## 2025-04-21 NOTE — TELEPHONE ENCOUNTER
Pharmacy ( [facility]) is calling , states that they got a order for a true metrix meter , [facility] states that they will need a order for lancets and test strips as well. Please advise. Did not pend since this is a different brand requested .

## 2025-04-21 NOTE — TELEPHONE ENCOUNTER
Done  Orders Placed This Encounter   Medications    blood glucose test strips (TRUE METRIX BLOOD GLUCOSE TEST) strip     Si each by In Vitro route daily As needed.     Dispense:  100 each     Refill:  3

## 2025-04-21 NOTE — TELEPHONE ENCOUNTER
I asked them to dispense brand that patient wanted  Call patient to see if there is a brand she wants

## 2025-04-24 RX ORDER — GLIPIZIDE 5 MG/1
5 TABLET ORAL
Qty: 90 TABLET | Refills: 1 | Status: SHIPPED | OUTPATIENT
Start: 2025-04-24

## 2025-04-28 NOTE — TELEPHONE ENCOUNTER
Jacquelyn says that she has stopped taking the Glipizide that was prescribed to her on 04/24 due to side effects(racing heart, insomnia, upset stomach) Instead she has upped her dose of Metformin. She is prescribed the Metformin 500 mg twice per day. 2 days ago she start taking 1000 mg twice per day. Pt was advised she should speak with her provider before changing the dosage of her medication. She would like medication sent in to make up for the extra dosage.

## 2025-04-29 ENCOUNTER — PATIENT MESSAGE (OUTPATIENT)
Dept: FAMILY MEDICINE CLINIC | Age: 65
End: 2025-04-29

## 2025-04-30 NOTE — TELEPHONE ENCOUNTER
I need to know if she is having side effects  In the past she had diarrhea with metformin  Did she stop the glipizide completely?    If tolerating the metformin I will send in new refill now    Is she taking her weekly vitamin D? Vitamin d was very low    Is she following up with gi and also rheumatlogy for the abnormal blood work?    Do follow up with new doctor in June

## 2025-05-20 ENCOUNTER — TRANSCRIBE ORDERS (OUTPATIENT)
Dept: ADMINISTRATIVE | Age: 65
End: 2025-05-20

## 2025-05-20 DIAGNOSIS — R19.5 ABNORMAL FINDINGS IN STOOL: ICD-10-CM

## 2025-05-20 DIAGNOSIS — K75.81 NONALCOHOLIC STEATOHEPATITIS (NASH): Primary | ICD-10-CM

## 2025-05-27 ENCOUNTER — HOSPITAL ENCOUNTER (OUTPATIENT)
Dept: ULTRASOUND IMAGING | Age: 65
Discharge: HOME | End: 2025-05-27

## 2025-05-27 DIAGNOSIS — R19.5 ABNORMAL FINDINGS IN STOOL: ICD-10-CM

## 2025-05-27 DIAGNOSIS — K75.81 NONALCOHOLIC STEATOHEPATITIS (NASH): ICD-10-CM

## 2025-05-28 ENCOUNTER — HOSPITAL ENCOUNTER (OUTPATIENT)
Dept: ULTRASOUND IMAGING | Age: 65
Discharge: HOME | End: 2025-05-28
Payer: MEDICARE

## 2025-05-28 DIAGNOSIS — K75.81 NASH (NONALCOHOLIC STEATOHEPATITIS): ICD-10-CM

## 2025-05-28 PROCEDURE — 76705 ECHO EXAM OF ABDOMEN: CPT

## 2025-06-03 NOTE — TELEPHONE ENCOUNTER
Last OV: 12/20/24  Next OV: 12/19/25  Last refill: 8/19/24 #180 3 R/F  Most recent Labs: 5/27/25  Last EKG (if needed):

## 2025-06-04 RX ORDER — CARVEDILOL 25 MG/1
25 TABLET ORAL 2 TIMES DAILY
Qty: 180 TABLET | Refills: 3 | Status: SHIPPED | OUTPATIENT
Start: 2025-06-04

## 2025-06-11 DIAGNOSIS — J44.9 CHRONIC OBSTRUCTIVE PULMONARY DISEASE, UNSPECIFIED COPD TYPE (HCC): ICD-10-CM

## 2025-06-12 RX ORDER — FLUTICASONE FUROATE, UMECLIDINIUM BROMIDE AND VILANTEROL TRIFENATATE 200; 62.5; 25 UG/1; UG/1; UG/1
POWDER RESPIRATORY (INHALATION)
Qty: 3 EACH | Refills: 3 | Status: SHIPPED | OUTPATIENT
Start: 2025-06-12

## 2025-06-16 SDOH — HEALTH STABILITY: PHYSICAL HEALTH: ON AVERAGE, HOW MANY DAYS PER WEEK DO YOU ENGAGE IN MODERATE TO STRENUOUS EXERCISE (LIKE A BRISK WALK)?: 1 DAY

## 2025-06-16 SDOH — HEALTH STABILITY: PHYSICAL HEALTH: ON AVERAGE, HOW MANY MINUTES DO YOU ENGAGE IN EXERCISE AT THIS LEVEL?: 20 MIN

## 2025-06-18 PROBLEM — K14.6 TONGUE PAIN: Status: RESOLVED | Noted: 2017-10-17 | Resolved: 2025-06-18

## 2025-06-18 PROBLEM — I42.8 NICM (NONISCHEMIC CARDIOMYOPATHY) (HCC): Status: ACTIVE | Noted: 2018-02-28

## 2025-06-18 NOTE — PROGRESS NOTES
Kendra Sears (:  1960) is a 64 y.o. female,Established patient, here for evaluation of the following chief complaint(s):  Established New Doctor, Hypertension, Diabetes, and Hyperlipidemia      Subjective       HPI    DM-Pt is on statin, metofomin (one in am and two at night)./. Last HgA1c was 7.2 2025. . Usually avg 128.  Last diabetic eye exam-Pt gets this done every yr. No changes  Last urine microalbumin/Cr-due    CHF/non-ischemic cardiomyopathy/AR/coronary artery calcification-Pt follows with Dr Kirkpatrick.  No issues.     RA-Pt has not seen anyone in a long time. Pt has tried multiple injectables and unable to tolerated due to lowering immune system.  Pt is on sulindac two times per day--pt says this is the only thing that works.    COPD-Follows with Dr Dasilva. Pt is on prn albuterol and trelegy. Uses albuterol one time per mth. Tobacco use--Next lung screening due in july    Cirrhosis/[place]-Pt follows with Dr Weston. Pt had EGD and no varices.     HTN-on losartan     Anxiety-Pt is on effexor and xanax. This is helping    Chronic lower back pain-Pt has spinal stimulator in place. Does not feel like this is helping. Pt is following with pain management.    Last blood work-cmp/cbc done 2025. CMP, A1c, lipid 2025      Review of Systems   Constitutional:  Negative for chills and fever.   Respiratory:  Negative for cough, shortness of breath and wheezing.    Cardiovascular:  Positive for palpitations (1-2 per mth--had holter monitor per cards that was.). Negative for chest pain and leg swelling.   Gastrointestinal:  Negative for abdominal pain, blood in stool, diarrhea and nausea.   Genitourinary:  Negative for difficulty urinating, flank pain and frequency.   Musculoskeletal:  Positive for back pain.   Psychiatric/Behavioral:  Negative for dysphoric mood. The patient is not nervous/anxious.              Objective     /60 (BP Site: Left Upper Arm, Patient Position: Sitting, BP

## 2025-06-19 ENCOUNTER — OFFICE VISIT (OUTPATIENT)
Dept: FAMILY MEDICINE CLINIC | Age: 65
End: 2025-06-19

## 2025-06-19 VITALS
BODY MASS INDEX: 24.48 KG/M2 | TEMPERATURE: 97.4 F | DIASTOLIC BLOOD PRESSURE: 60 MMHG | SYSTOLIC BLOOD PRESSURE: 120 MMHG | HEART RATE: 70 BPM | WEIGHT: 156 LBS | HEIGHT: 67 IN | OXYGEN SATURATION: 98 %

## 2025-06-19 DIAGNOSIS — Z87.891 PERSONAL HISTORY OF TOBACCO USE: ICD-10-CM

## 2025-06-19 DIAGNOSIS — F41.1 ANXIETY STATE: ICD-10-CM

## 2025-06-19 DIAGNOSIS — I42.8 NICM (NONISCHEMIC CARDIOMYOPATHY) (HCC): ICD-10-CM

## 2025-06-19 DIAGNOSIS — M06.9 RHEUMATOID ARTHRITIS, INVOLVING UNSPECIFIED SITE, UNSPECIFIED WHETHER RHEUMATOID FACTOR PRESENT (HCC): ICD-10-CM

## 2025-06-19 DIAGNOSIS — E78.2 MIXED HYPERLIPIDEMIA: Primary | ICD-10-CM

## 2025-06-19 DIAGNOSIS — J44.9 CHRONIC OBSTRUCTIVE PULMONARY DISEASE, UNSPECIFIED COPD TYPE (HCC): ICD-10-CM

## 2025-06-19 DIAGNOSIS — I10 ESSENTIAL HYPERTENSION: ICD-10-CM

## 2025-06-19 DIAGNOSIS — I50.32 CHRONIC DIASTOLIC HEART FAILURE (HCC): ICD-10-CM

## 2025-06-19 DIAGNOSIS — E11.9 TYPE 2 DIABETES MELLITUS WITHOUT COMPLICATION, WITHOUT LONG-TERM CURRENT USE OF INSULIN (HCC): ICD-10-CM

## 2025-06-19 DIAGNOSIS — K74.69 OTHER CIRRHOSIS OF LIVER (HCC): ICD-10-CM

## 2025-06-19 ASSESSMENT — ENCOUNTER SYMPTOMS
BACK PAIN: 1
ABDOMINAL PAIN: 0
NAUSEA: 0
WHEEZING: 0
DIARRHEA: 0
SHORTNESS OF BREATH: 0
BLOOD IN STOOL: 0
COUGH: 0

## 2025-06-30 RX ORDER — CYCLOBENZAPRINE HCL 10 MG
10 TABLET ORAL 3 TIMES DAILY PRN
Qty: 90 TABLET | Refills: 1 | Status: SHIPPED | OUTPATIENT
Start: 2025-06-30

## 2025-06-30 NOTE — TELEPHONE ENCOUNTER
BETTY patient regarding this information. Patient states she is no longer on the Robaxin. It was a short term dose.   Please advise.

## 2025-07-14 NOTE — TELEPHONE ENCOUNTER
Copied from CRM #[id number]. Topic: MW Messaging - MW Patient Request  >> Jul 14, 2025 11:01 AM Shalonda LUNA wrote:  --DO NOT REPLY - Sent from PACT - If sent to wrong pool, reroute to P ECO Reroute pool --    General Message: Patient is returning missed call. Unsure if callback may have came from Dr. Walton or Upcoming surgery Surgeon's office - Abdullahi May  Callback #: [phone]   Can a detailed message be left? Yes - Voicemail   Caller has been advised this message will be addressed within:2-3 business days [routine]         LM for patient regarding this information.

## 2025-07-21 ENCOUNTER — HOSPITAL ENCOUNTER (OUTPATIENT)
Dept: CT IMAGING | Age: 65
Discharge: HOME | End: 2025-07-21
Payer: MEDICARE

## 2025-07-21 DIAGNOSIS — Z87.891 PERSONAL HISTORY OF TOBACCO USE: ICD-10-CM

## 2025-07-21 PROCEDURE — 71271 CT THORAX LUNG CANCER SCR C-: CPT

## 2025-08-01 RX ORDER — VENLAFAXINE HYDROCHLORIDE 150 MG/1
150 CAPSULE, EXTENDED RELEASE ORAL DAILY
Qty: 90 CAPSULE | Refills: 1 | Status: SHIPPED | OUTPATIENT
Start: 2025-08-01

## 2025-08-04 RX ORDER — ERGOCALCIFEROL 1.25 MG/1
50000 CAPSULE, LIQUID FILLED ORAL WEEKLY
Qty: 12 CAPSULE | Refills: 1 | Status: SHIPPED | OUTPATIENT
Start: 2025-08-04

## 2025-08-14 RX ORDER — CYCLOBENZAPRINE HCL 10 MG
TABLET ORAL
Qty: 90 TABLET | Refills: 0 | Status: SHIPPED | OUTPATIENT
Start: 2025-08-14

## 2025-08-16 DIAGNOSIS — I10 ESSENTIAL HYPERTENSION: ICD-10-CM

## 2025-08-18 ENCOUNTER — HOSPITAL ENCOUNTER (OUTPATIENT)
Dept: WOMENS IMAGING | Age: 65
Discharge: HOME | End: 2025-08-18
Payer: MEDICARE

## 2025-08-18 VITALS — HEIGHT: 66 IN | BODY MASS INDEX: 24.75 KG/M2 | WEIGHT: 154 LBS

## 2025-08-18 DIAGNOSIS — Z12.31 ENCOUNTER FOR SCREENING MAMMOGRAM FOR BREAST CANCER: ICD-10-CM

## 2025-08-18 PROCEDURE — 77063 BREAST TOMOSYNTHESIS BI: CPT

## 2025-08-18 RX ORDER — LOSARTAN POTASSIUM 100 MG/1
100 TABLET ORAL DAILY
Qty: 90 TABLET | Refills: 1 | Status: SHIPPED | OUTPATIENT
Start: 2025-08-18

## 2025-08-18 RX ORDER — ATORVASTATIN CALCIUM 20 MG/1
TABLET, FILM COATED ORAL
Qty: 90 TABLET | Refills: 0 | Status: SHIPPED | OUTPATIENT
Start: 2025-08-18

## 2025-08-20 ENCOUNTER — OFFICE VISIT (OUTPATIENT)
Dept: PULMONOLOGY | Age: 65
End: 2025-08-20

## 2025-08-20 VITALS
DIASTOLIC BLOOD PRESSURE: 70 MMHG | TEMPERATURE: 98 F | HEIGHT: 66 IN | RESPIRATION RATE: 18 BRPM | HEART RATE: 74 BPM | SYSTOLIC BLOOD PRESSURE: 118 MMHG | BODY MASS INDEX: 24.94 KG/M2 | OXYGEN SATURATION: 95 % | WEIGHT: 155.2 LBS

## 2025-08-20 DIAGNOSIS — J44.9 CHRONIC OBSTRUCTIVE PULMONARY DISEASE, UNSPECIFIED COPD TYPE (HCC): Primary | ICD-10-CM

## 2025-08-20 ASSESSMENT — COPD QUESTIONNAIRES: COPD: 1
